# Patient Record
Sex: FEMALE | Race: WHITE | NOT HISPANIC OR LATINO | Employment: OTHER | URBAN - METROPOLITAN AREA
[De-identification: names, ages, dates, MRNs, and addresses within clinical notes are randomized per-mention and may not be internally consistent; named-entity substitution may affect disease eponyms.]

---

## 2017-01-01 ENCOUNTER — TELEPHONE (OUTPATIENT)
Dept: MEDICAL GROUP | Age: 82
End: 2017-01-01

## 2017-01-01 ENCOUNTER — OFFICE VISIT (OUTPATIENT)
Dept: MEDICAL GROUP | Age: 82
End: 2017-01-01
Payer: MEDICARE

## 2017-01-01 ENCOUNTER — HOSPITAL ENCOUNTER (OUTPATIENT)
Facility: MEDICAL CENTER | Age: 82
End: 2017-11-17
Attending: INTERNAL MEDICINE
Payer: MEDICARE

## 2017-01-01 ENCOUNTER — HOSPITAL ENCOUNTER (OUTPATIENT)
Dept: LAB | Facility: MEDICAL CENTER | Age: 82
End: 2017-11-07
Attending: INTERNAL MEDICINE
Payer: MEDICARE

## 2017-01-01 ENCOUNTER — HOSPITAL ENCOUNTER (OUTPATIENT)
Facility: MEDICAL CENTER | Age: 82
End: 2017-11-10
Attending: INTERNAL MEDICINE
Payer: MEDICARE

## 2017-01-01 VITALS
WEIGHT: 113 LBS | HEIGHT: 60 IN | OXYGEN SATURATION: 94 % | TEMPERATURE: 98.8 F | BODY MASS INDEX: 22.19 KG/M2 | HEART RATE: 91 BPM | SYSTOLIC BLOOD PRESSURE: 144 MMHG | DIASTOLIC BLOOD PRESSURE: 98 MMHG

## 2017-01-01 DIAGNOSIS — I50.22 CHRONIC SYSTOLIC HEART FAILURE (HCC): ICD-10-CM

## 2017-01-01 DIAGNOSIS — E78.2 MIXED HYPERLIPIDEMIA: ICD-10-CM

## 2017-01-01 DIAGNOSIS — K22.2 ESOPHAGEAL STRICTURE: ICD-10-CM

## 2017-01-01 DIAGNOSIS — D50.9 IRON DEFICIENCY ANEMIA, UNSPECIFIED IRON DEFICIENCY ANEMIA TYPE: ICD-10-CM

## 2017-01-01 DIAGNOSIS — I24.0 ISCHEMIC HEART DISEASE DUE TO CORONARY ARTERY OBSTRUCTION (HCC): ICD-10-CM

## 2017-01-01 DIAGNOSIS — L03.113 CELLULITIS OF FOREARM, RIGHT: ICD-10-CM

## 2017-01-01 DIAGNOSIS — I10 ESSENTIAL HYPERTENSION: ICD-10-CM

## 2017-01-01 DIAGNOSIS — N39.0 CHRONIC UTI (URINARY TRACT INFECTION): ICD-10-CM

## 2017-01-01 DIAGNOSIS — I69.328 CVA, OLD, SPEECH/LANGUAGE DEFICIT: ICD-10-CM

## 2017-01-01 DIAGNOSIS — I25.9 ISCHEMIC HEART DISEASE DUE TO CORONARY ARTERY OBSTRUCTION (HCC): ICD-10-CM

## 2017-01-01 DIAGNOSIS — D50.0 IRON DEFICIENCY ANEMIA DUE TO CHRONIC BLOOD LOSS: ICD-10-CM

## 2017-01-01 DIAGNOSIS — G25.81 RLS (RESTLESS LEGS SYNDROME): ICD-10-CM

## 2017-01-01 DIAGNOSIS — I27.20 MODERATE TO SEVERE PULMONARY HYPERTENSION (HCC): ICD-10-CM

## 2017-01-01 DIAGNOSIS — E03.9 ACQUIRED HYPOTHYROIDISM: ICD-10-CM

## 2017-01-01 DIAGNOSIS — R35.0 FREQUENCY OF URINATION: ICD-10-CM

## 2017-01-01 DIAGNOSIS — N18.30 CKD (CHRONIC KIDNEY DISEASE) STAGE 3, GFR 30-59 ML/MIN (HCC): ICD-10-CM

## 2017-01-01 DIAGNOSIS — G89.4 CHRONIC PAIN SYNDROME: ICD-10-CM

## 2017-01-01 DIAGNOSIS — Z79.01 CHRONIC ANTICOAGULATION: ICD-10-CM

## 2017-01-01 DIAGNOSIS — R93.89 ABNORMAL CT SCAN, CHEST: ICD-10-CM

## 2017-01-01 DIAGNOSIS — Z12.11 SCREEN FOR COLON CANCER: ICD-10-CM

## 2017-01-01 DIAGNOSIS — N32.81 OAB (OVERACTIVE BLADDER): ICD-10-CM

## 2017-01-01 DIAGNOSIS — I25.2 OLD MI (MYOCARDIAL INFARCTION): ICD-10-CM

## 2017-01-01 DIAGNOSIS — I07.1 SEVERE TRICUSPID REGURGITATION BY PRIOR ECHOCARDIOGRAM: ICD-10-CM

## 2017-01-01 DIAGNOSIS — I48.20 CHRONIC ATRIAL FIBRILLATION (HCC): ICD-10-CM

## 2017-01-01 LAB
ALBUMIN SERPL BCP-MCNC: 3.9 G/DL (ref 3.2–4.9)
ALBUMIN/GLOB SERPL: 1.4 G/DL
ALP SERPL-CCNC: 54 U/L (ref 30–99)
ALT SERPL-CCNC: 16 U/L (ref 2–50)
ANION GAP SERPL CALC-SCNC: 8 MMOL/L (ref 0–11.9)
APPEARANCE UR: NORMAL
AST SERPL-CCNC: 27 U/L (ref 12–45)
BACTERIA UR CULT: ABNORMAL
BASOPHILS # BLD AUTO: 1.8 % (ref 0–1.8)
BASOPHILS # BLD: 0.11 K/UL (ref 0–0.12)
BILIRUB SERPL-MCNC: 0.9 MG/DL (ref 0.1–1.5)
BILIRUB UR STRIP-MCNC: NEGATIVE MG/DL
BUN SERPL-MCNC: 28 MG/DL (ref 8–22)
CALCIUM SERPL-MCNC: 9 MG/DL (ref 8.5–10.5)
CHLORIDE SERPL-SCNC: 106 MMOL/L (ref 96–112)
CHOLEST SERPL-MCNC: 163 MG/DL (ref 100–199)
CO2 SERPL-SCNC: 24 MMOL/L (ref 20–33)
COLOR UR AUTO: NORMAL
CREAT SERPL-MCNC: 0.77 MG/DL (ref 0.5–1.4)
EOSINOPHIL # BLD AUTO: 0.09 K/UL (ref 0–0.51)
EOSINOPHIL NFR BLD: 1.4 % (ref 0–6.9)
ERYTHROCYTE [DISTWIDTH] IN BLOOD BY AUTOMATED COUNT: 45.1 FL (ref 35.9–50)
FERRITIN SERPL-MCNC: 16.4 NG/ML (ref 10–291)
GFR SERPL CREATININE-BSD FRML MDRD: >60 ML/MIN/1.73 M 2
GLOBULIN SER CALC-MCNC: 2.7 G/DL (ref 1.9–3.5)
GLUCOSE SERPL-MCNC: 89 MG/DL (ref 65–99)
GLUCOSE UR STRIP.AUTO-MCNC: NEGATIVE MG/DL
HCT VFR BLD AUTO: 35.5 % (ref 37–47)
HDLC SERPL-MCNC: 60 MG/DL
HEMOCCULT STL QL IA: NEGATIVE
HGB BLD-MCNC: 10.8 G/DL (ref 12–16)
IMM GRANULOCYTES # BLD AUTO: 0.03 K/UL (ref 0–0.11)
IMM GRANULOCYTES NFR BLD AUTO: 0.5 % (ref 0–0.9)
IRON SATN MFR SERPL: 8 % (ref 15–55)
IRON SERPL-MCNC: 39 UG/DL (ref 40–170)
KETONES UR STRIP.AUTO-MCNC: NEGATIVE MG/DL
LDLC SERPL CALC-MCNC: 91 MG/DL
LEUKOCYTE ESTERASE UR QL STRIP.AUTO: NORMAL
LYMPHOCYTES # BLD AUTO: 1.55 K/UL (ref 1–4.8)
LYMPHOCYTES NFR BLD: 24.8 % (ref 22–41)
MCH RBC QN AUTO: 25.4 PG (ref 27–33)
MCHC RBC AUTO-ENTMCNC: 30.4 G/DL (ref 33.6–35)
MCV RBC AUTO: 83.5 FL (ref 81.4–97.8)
MONOCYTES # BLD AUTO: 0.53 K/UL (ref 0–0.85)
MONOCYTES NFR BLD AUTO: 8.5 % (ref 0–13.4)
NEUTROPHILS # BLD AUTO: 3.93 K/UL (ref 2–7.15)
NEUTROPHILS NFR BLD: 63 % (ref 44–72)
NITRITE UR QL STRIP.AUTO: POSITIVE
NRBC # BLD AUTO: 0 K/UL
NRBC BLD AUTO-RTO: 0 /100 WBC
PH UR STRIP.AUTO: 7 [PH] (ref 5–8)
PLATELET # BLD AUTO: 249 K/UL (ref 164–446)
PMV BLD AUTO: 11.7 FL (ref 9–12.9)
POTASSIUM SERPL-SCNC: 4.7 MMOL/L (ref 3.6–5.5)
PROT SERPL-MCNC: 6.6 G/DL (ref 6–8.2)
PROT UR QL STRIP: 30 MG/DL
RBC # BLD AUTO: 4.25 M/UL (ref 4.2–5.4)
RBC UR QL AUTO: NORMAL
SIGNIFICANT IND 70042: ABNORMAL
SODIUM SERPL-SCNC: 138 MMOL/L (ref 135–145)
SOURCE SOURCE: ABNORMAL
SP GR UR STRIP.AUTO: 1.01
TIBC SERPL-MCNC: 504 UG/DL (ref 250–450)
TRIGL SERPL-MCNC: 62 MG/DL (ref 0–149)
TSH SERPL DL<=0.005 MIU/L-ACNC: 6.12 UIU/ML (ref 0.3–3.7)
UROBILINOGEN UR STRIP-MCNC: 0.2 MG/DL
WBC # BLD AUTO: 6.2 K/UL (ref 4.8–10.8)

## 2017-01-01 PROCEDURE — 36415 COLL VENOUS BLD VENIPUNCTURE: CPT

## 2017-01-01 PROCEDURE — 85025 COMPLETE CBC W/AUTO DIFF WBC: CPT

## 2017-01-01 PROCEDURE — 81002 URINALYSIS NONAUTO W/O SCOPE: CPT | Performed by: INTERNAL MEDICINE

## 2017-01-01 PROCEDURE — 83550 IRON BINDING TEST: CPT

## 2017-01-01 PROCEDURE — 99215 OFFICE O/P EST HI 40 MIN: CPT | Performed by: INTERNAL MEDICINE

## 2017-01-01 PROCEDURE — 82728 ASSAY OF FERRITIN: CPT

## 2017-01-01 PROCEDURE — 84443 ASSAY THYROID STIM HORMONE: CPT

## 2017-01-01 PROCEDURE — 82274 ASSAY TEST FOR BLOOD FECAL: CPT

## 2017-01-01 PROCEDURE — 99000 SPECIMEN HANDLING OFFICE-LAB: CPT | Performed by: INTERNAL MEDICINE

## 2017-01-01 PROCEDURE — 87086 URINE CULTURE/COLONY COUNT: CPT

## 2017-01-01 PROCEDURE — 87186 SC STD MICRODIL/AGAR DIL: CPT

## 2017-01-01 PROCEDURE — 80053 COMPREHEN METABOLIC PANEL: CPT

## 2017-01-01 PROCEDURE — 87077 CULTURE AEROBIC IDENTIFY: CPT

## 2017-01-01 PROCEDURE — 83540 ASSAY OF IRON: CPT

## 2017-01-01 PROCEDURE — 80061 LIPID PANEL: CPT

## 2017-01-01 RX ORDER — DULOXETIN HYDROCHLORIDE 20 MG/1
20 CAPSULE, DELAYED RELEASE ORAL DAILY
Qty: 30 CAP | Refills: 11
Start: 2017-01-01 | End: 2018-01-01

## 2017-01-01 RX ORDER — NITROFURANTOIN 25; 75 MG/1; MG/1
100 CAPSULE ORAL DAILY
Qty: 90 CAP | Refills: 4 | Status: ON HOLD | OUTPATIENT
Start: 2017-01-01 | End: 2018-01-01

## 2017-01-01 RX ORDER — FERROUS SULFATE 325(65) MG
325 TABLET ORAL DAILY
Qty: 180 TAB | Refills: 1 | Status: SHIPPED | OUTPATIENT
Start: 2017-01-01 | End: 2017-01-01 | Stop reason: SDUPTHER

## 2017-01-01 RX ORDER — FERROUS SULFATE 325(65) MG
325 TABLET ORAL DAILY
Qty: 90 TAB | Refills: 3 | Status: ON HOLD
Start: 2017-01-01 | End: 2018-01-01

## 2017-01-01 RX ORDER — LEVOTHYROXINE SODIUM 0.03 MG/1
25 TABLET ORAL
Qty: 90 TAB | Refills: 4 | Status: SHIPPED | OUTPATIENT
Start: 2017-01-01 | End: 2018-01-01

## 2017-01-01 RX ORDER — NITROFURANTOIN 25; 75 MG/1; MG/1
100 CAPSULE ORAL 2 TIMES DAILY
COMMUNITY
End: 2017-01-01 | Stop reason: SDUPTHER

## 2017-01-01 ASSESSMENT — ENCOUNTER SYMPTOMS
EYES NEGATIVE: 1
CONSTITUTIONAL NEGATIVE: 1
CARDIOVASCULAR NEGATIVE: 1
PSYCHIATRIC NEGATIVE: 1
NEUROLOGICAL NEGATIVE: 1
MUSCULOSKELETAL NEGATIVE: 1
GASTROINTESTINAL NEGATIVE: 1
RESPIRATORY NEGATIVE: 1

## 2017-01-01 ASSESSMENT — PATIENT HEALTH QUESTIONNAIRE - PHQ9: CLINICAL INTERPRETATION OF PHQ2 SCORE: 0

## 2017-01-05 ENCOUNTER — HOME CARE VISIT (OUTPATIENT)
Dept: HOME HEALTH SERVICES | Facility: HOME HEALTHCARE | Age: 82
End: 2017-01-05
Payer: MEDICARE

## 2017-01-05 PROCEDURE — G0153 HHCP-SVS OF S/L PATH,EA 15MN: HCPCS

## 2017-01-06 ENCOUNTER — HOME CARE VISIT (OUTPATIENT)
Dept: HOME HEALTH SERVICES | Facility: HOME HEALTHCARE | Age: 82
End: 2017-01-06
Payer: MEDICARE

## 2017-01-06 VITALS
TEMPERATURE: 99.2 F | BODY MASS INDEX: 21.74 KG/M2 | RESPIRATION RATE: 16 BRPM | SYSTOLIC BLOOD PRESSURE: 158 MMHG | WEIGHT: 111.3 LBS | DIASTOLIC BLOOD PRESSURE: 70 MMHG | HEART RATE: 88 BPM

## 2017-01-06 PROCEDURE — G0162 HHC RN E&M PLAN SVS, 15 MIN: HCPCS

## 2017-01-06 ASSESSMENT — ACTIVITIES OF DAILY LIVING (ADL)
HOME_HEALTH_OASIS: 01
OASIS_M1830: 01

## 2017-01-06 ASSESSMENT — ENCOUNTER SYMPTOMS: DIFFICULTY THINKING: 1

## 2017-01-09 SDOH — ECONOMIC STABILITY: HOUSING INSECURITY: UNSAFE APPLIANCES: 0

## 2017-01-09 SDOH — ECONOMIC STABILITY: HOUSING INSECURITY: UNSAFE COOKING RANGE AREA: 0

## 2017-02-08 ENCOUNTER — HOSPITAL ENCOUNTER (OUTPATIENT)
Dept: LAB | Facility: MEDICAL CENTER | Age: 82
End: 2017-02-08
Attending: INTERNAL MEDICINE
Payer: MEDICARE

## 2017-02-08 DIAGNOSIS — E78.5 DYSLIPIDEMIA: ICD-10-CM

## 2017-02-08 LAB
ALBUMIN SERPL BCP-MCNC: 4.6 G/DL (ref 3.2–4.9)
ALBUMIN/GLOB SERPL: 1.9 G/DL
ALP SERPL-CCNC: 48 U/L (ref 30–99)
ALT SERPL-CCNC: 18 U/L (ref 2–50)
ANION GAP SERPL CALC-SCNC: 10 MMOL/L (ref 0–11.9)
AST SERPL-CCNC: 26 U/L (ref 12–45)
BILIRUB SERPL-MCNC: 1 MG/DL (ref 0.1–1.5)
BUN SERPL-MCNC: 28 MG/DL (ref 8–22)
CALCIUM SERPL-MCNC: 10 MG/DL (ref 8.5–10.5)
CHLORIDE SERPL-SCNC: 104 MMOL/L (ref 96–112)
CHOLEST SERPL-MCNC: 215 MG/DL (ref 100–199)
CO2 SERPL-SCNC: 24 MMOL/L (ref 20–33)
CREAT SERPL-MCNC: 0.96 MG/DL (ref 0.5–1.4)
GLOBULIN SER CALC-MCNC: 2.4 G/DL (ref 1.9–3.5)
GLUCOSE SERPL-MCNC: 86 MG/DL (ref 65–99)
HDLC SERPL-MCNC: 72 MG/DL
LDLC SERPL CALC-MCNC: 127 MG/DL
POTASSIUM SERPL-SCNC: 4.2 MMOL/L (ref 3.6–5.5)
PROT SERPL-MCNC: 7 G/DL (ref 6–8.2)
SODIUM SERPL-SCNC: 138 MMOL/L (ref 135–145)
TRIGL SERPL-MCNC: 82 MG/DL (ref 0–149)

## 2017-02-08 PROCEDURE — 80061 LIPID PANEL: CPT

## 2017-02-08 PROCEDURE — 36415 COLL VENOUS BLD VENIPUNCTURE: CPT

## 2017-02-08 PROCEDURE — 80053 COMPREHEN METABOLIC PANEL: CPT

## 2017-02-09 ENCOUNTER — TELEPHONE (OUTPATIENT)
Dept: CARDIOLOGY | Facility: MEDICAL CENTER | Age: 82
End: 2017-02-09

## 2017-02-09 DIAGNOSIS — I25.83 CORONARY ARTERY DISEASE DUE TO LIPID RICH PLAQUE: ICD-10-CM

## 2017-02-09 DIAGNOSIS — I25.10 CORONARY ARTERY DISEASE INVOLVING NATIVE CORONARY ARTERY OF NATIVE HEART WITHOUT ANGINA PECTORIS: ICD-10-CM

## 2017-02-09 DIAGNOSIS — I25.10 CORONARY ARTERY DISEASE DUE TO LIPID RICH PLAQUE: ICD-10-CM

## 2017-02-10 NOTE — TELEPHONE ENCOUNTER
----- Message from Kirsten Garza M.D. sent at 2/9/2017  2:58 PM PST -----    LDL not at goal. Unclear whether the patient is taking Zocor or not. If patient is taking Zocor then we should switch her to Crestor 20 mg Qhs.  Check labs in 2 months.  If patient is not taking Zocor and if she is willing then ask her to start taking Zocor 40 mg Qhs.  Labs in 2 months.    ----- Message -----     From: Joyce Johnson R.N.     Sent: 2/9/2017   8:51 AM       To: Kirsten Garza M.D.    F/u 3/15/17

## 2017-02-10 NOTE — TELEPHONE ENCOUNTER
After reviewing notes, pt was switched to Crestor 20mg from Zocor 40mg back in December and these are the repeated labs on Crestor 20mg.    S/w pt's caregiver, confirmed the above and states pt has been taking Crestor 20mg qhs.  Advised will check back with AM and call back with recommendations.  Agreed.    Pt is taking Crestor 20mg and has for the past 2 months, to AM for recommendation. Please respond to Nicolasa Mueller, she will be the nurse on Friday, 2/10/17.

## 2017-02-10 NOTE — TELEPHONE ENCOUNTER
Spoke with PT's caregiver and now she reports that this AM she reviewed medications with PT and says that she has not been taking any Crestor at least for over a month. Caregiver will make sure she will restart taking her Crestor 20 mg.  To  for update.  MMR

## 2017-02-15 ENCOUNTER — HOSPITAL ENCOUNTER (OUTPATIENT)
Facility: MEDICAL CENTER | Age: 82
End: 2017-02-15
Attending: INTERNAL MEDICINE
Payer: MEDICARE

## 2017-02-15 ENCOUNTER — OFFICE VISIT (OUTPATIENT)
Dept: MEDICAL GROUP | Age: 82
End: 2017-02-15
Payer: MEDICARE

## 2017-02-15 VITALS
WEIGHT: 109.6 LBS | DIASTOLIC BLOOD PRESSURE: 72 MMHG | HEIGHT: 60 IN | RESPIRATION RATE: 16 BRPM | HEART RATE: 86 BPM | OXYGEN SATURATION: 94 % | SYSTOLIC BLOOD PRESSURE: 122 MMHG | BODY MASS INDEX: 21.52 KG/M2 | TEMPERATURE: 97.5 F

## 2017-02-15 DIAGNOSIS — I48.20 CHRONIC ATRIAL FIBRILLATION (HCC): ICD-10-CM

## 2017-02-15 DIAGNOSIS — N30.00 ACUTE CYSTITIS WITHOUT HEMATURIA: ICD-10-CM

## 2017-02-15 DIAGNOSIS — D50.8 OTHER IRON DEFICIENCY ANEMIA: ICD-10-CM

## 2017-02-15 DIAGNOSIS — E78.2 MIXED HYPERLIPIDEMIA: ICD-10-CM

## 2017-02-15 DIAGNOSIS — I10 ESSENTIAL HYPERTENSION: ICD-10-CM

## 2017-02-15 LAB
APPEARANCE UR: CLEAR
BILIRUB UR STRIP-MCNC: NORMAL MG/DL
COLOR UR AUTO: YELLOW
GLUCOSE UR STRIP.AUTO-MCNC: NORMAL MG/DL
KETONES UR STRIP.AUTO-MCNC: NORMAL MG/DL
LEUKOCYTE ESTERASE UR QL STRIP.AUTO: NORMAL
NITRITE UR QL STRIP.AUTO: NORMAL
PH UR STRIP.AUTO: 6 [PH] (ref 5–8)
PROT UR QL STRIP: NORMAL MG/DL
RBC UR QL AUTO: NORMAL
SP GR UR STRIP.AUTO: 1
UROBILINOGEN UR STRIP-MCNC: NORMAL MG/DL

## 2017-02-15 PROCEDURE — 87086 URINE CULTURE/COLONY COUNT: CPT

## 2017-02-15 PROCEDURE — 1036F TOBACCO NON-USER: CPT | Performed by: INTERNAL MEDICINE

## 2017-02-15 PROCEDURE — 87186 SC STD MICRODIL/AGAR DIL: CPT

## 2017-02-15 PROCEDURE — G8484 FLU IMMUNIZE NO ADMIN: HCPCS | Performed by: INTERNAL MEDICINE

## 2017-02-15 PROCEDURE — 87077 CULTURE AEROBIC IDENTIFY: CPT

## 2017-02-15 PROCEDURE — 4040F PNEUMOC VAC/ADMIN/RCVD: CPT | Performed by: INTERNAL MEDICINE

## 2017-02-15 PROCEDURE — G8419 CALC BMI OUT NRM PARAM NOF/U: HCPCS | Performed by: INTERNAL MEDICINE

## 2017-02-15 PROCEDURE — G8599 NO ASA/ANTIPLAT THER USE RNG: HCPCS | Performed by: INTERNAL MEDICINE

## 2017-02-15 PROCEDURE — 1101F PT FALLS ASSESS-DOCD LE1/YR: CPT | Performed by: INTERNAL MEDICINE

## 2017-02-15 PROCEDURE — 81002 URINALYSIS NONAUTO W/O SCOPE: CPT | Performed by: INTERNAL MEDICINE

## 2017-02-15 PROCEDURE — 99214 OFFICE O/P EST MOD 30 MIN: CPT | Performed by: INTERNAL MEDICINE

## 2017-02-15 PROCEDURE — G8432 DEP SCR NOT DOC, RNG: HCPCS | Performed by: INTERNAL MEDICINE

## 2017-02-15 RX ORDER — NITROFURANTOIN 25; 75 MG/1; MG/1
100 CAPSULE ORAL 2 TIMES DAILY
Qty: 20 CAP | Refills: 1 | Status: SHIPPED | OUTPATIENT
Start: 2017-02-15 | End: 2017-03-30 | Stop reason: SDUPTHER

## 2017-02-15 ASSESSMENT — ENCOUNTER SYMPTOMS
CONSTITUTIONAL NEGATIVE: 1
CARDIOVASCULAR NEGATIVE: 1
MUSCULOSKELETAL NEGATIVE: 1
NEUROLOGICAL NEGATIVE: 1
GASTROINTESTINAL NEGATIVE: 1
EYES NEGATIVE: 1
RESPIRATORY NEGATIVE: 1
PSYCHIATRIC NEGATIVE: 1

## 2017-02-15 NOTE — MR AVS SNAPSHOT
Barbara Zaragoza Trevor   2/15/2017 10:20 AM   Office Visit   MRN: [id number]    Department:  [address] [place] [address]   Dept Phone:  [phone]    Description:  Female : 8/10/1928   Provider:  Mahendra Morrow M.D.           Reason for Visit     UTI f/v on UTI and urinary frequency      Allergies as of 2/15/2017     Allergen Noted Reactions    Pcn [Penicillins] 2010   Hives      You were diagnosed with     Acute cystitis without hematuria   [511150]       Mixed hyperlipidemia   [272.2.ICD-9-CM]       Essential hypertension   [4240837]       Other iron deficiency anemia   [5703419]       Chronic atrial fibrillation (CMS-HCC)   [278839]         Vital Signs     Blood Pressure Pulse Temperature Respirations Height Weight    122/72 mmHg 86 36.4 °C (97.5 °F) 16 1.524 m (5') 49.714 kg (109 lb 9.6 oz)    Body Mass Index Oxygen Saturation Smoking Status             21.40 kg/m2 94% Former Smoker         Basic Information     Date Of Birth Sex Race Ethnicity Preferred Language    8/10/1928 Female White Non- English      Your appointments     Aug 16, 2017  9:40 AM   Established Patient with Mahendra Morrow M.D.   [address] [address] [phone]           You will be receiving a confirmation call a few days before your appointment from our automated call confirmation system.              Problem List              ICD-10-CM Priority Class Noted - Resolved    Esophageal stricture- s/p dilation dr foley K22.2   2013 - Present    MI (myocardial infarction) (CMS-HCC) I21.3 High  2013 - Present    Glaucoma H40.9 Low  10/7/2013 - Present    Chronic atrial fibrillation- rx Eliquis; dr scruggs I48.2 Medium  2014 - Present    Chronic anticoagulation Z79.01 Low  2015 - Present    CHF (congestive heart failure)- EF=51% 2015; dr scruggs I50.9 High  2015 - Present    Iron deficiency anemia D50.9 Medium  2015 - Present    Moderate to severe pulmonary hypertension (CMS-HCC) I27.2 High  5/14/2015 - Present    Coronary artery disease involving native coronary artery of native heart without angina pectoris- non STEMI 2013; no stents or surgery; dr scruggs I25.10   10/6/2015 - Present    Acquired trigger finger M65.30   10/20/2015 - Present    Acute carpal tunnel syndrome of right wrist- repaired 10/2015 dr schmidt G56.01   10/20/2015 - Present    CVA, old, speech/language deficit- 2013 I69.328 Low  10/21/2015 - Present    Right knee pain- 12/4/2015; s/pd right TKR 2010 M25.561   12/7/2015 - Present    Muscular chest pain- right since 12/2014 R07.89   12/7/2015 - Present    Chronic pain syndrome-Dr. Capellan G89.4   4/20/2016 - Present    Urinary frequency R35.0   10/11/2016 - Present    Pruritic dermatitis L29.9   10/31/2016 - Present    Acute on chronic systolic heart failure (CMS-HCC) I50.23 High  11/5/2016 - Present    Atrial fibrillation (CMS-HCC) I48.91 High  11/5/2016 - Present    CKD (chronic kidney disease), stage II N18.2 Low  11/5/2016 - Present    HLD (hyperlipidemia) E78.5 Low  11/5/2016 - Present    HTN (hypertension) I10 Low  11/5/2016 - Present    H/O valvular heart disease Z86.79 Low  11/5/2016 - Present    CAD (coronary artery disease) I25.10 Low  11/5/2016 - Present    RLS (restless legs syndrome) G25.81 Low  11/5/2016 - Present    SOB (shortness of breath) R06.02   11/9/2016 - Present      Health Maintenance        Date Due Completion Dates    IMM DTaP/Tdap/Td Vaccine (1 - Tdap) 8/10/1947 ---    IMM ZOSTER VACCINE 8/10/1988 ---    MAMMOGRAM 3/19/2015 3/19/2014, 2/7/2013, 6/15/2012, 6/12/2012, 1/27/2011, 12/15/2009, 12/15/2009, 11/13/2008, 11/13/2008, 10/9/2007, 10/9/2007, 9/7/2006, 9/7/2006, 8/3/2005    IMM INFLUENZA (1) 9/1/2016 10/28/2013, 10/1/2012    BONE DENSITY 7/8/2019 7/8/2014, 4/12/2011            Current Immunizations     13-VALENT PCV PREVNAR 12/7/2015, 10/21/2015    Influenza TIV (IM) 10/28/2013, 10/1/2012     Pneumococcal polysaccharide vaccine (PPSV-23) 8/1/2008      Below and/or attached are the medications your provider expects you to take. Review all of your home medications and newly ordered medications with your provider and/or pharmacist. Follow medication instructions as directed by your provider and/or pharmacist. Please keep your medication list with you and share with your provider. Update the information when medications are discontinued, doses are changed, or new medications (including over-the-counter products) are added; and carry medication information at all times in the event of emergency situations     Allergies:  PCN - Hives               Medications  Valid as of: February 15, 2017 - 11:15 AM    Generic Name Brand Name Tablet Size Instructions for use    Apixaban (Tab) ELIQUIS 2.5mg Take 1 Tab by mouth 2 Times a Day.        Betaxolol HCl (Suspension) BETOPTIC-S 0.25 % Place 1 Drop in both eyes every day.        Carvedilol (Tab) COREG 6.25 MG Take 1 Tab by mouth 2 times a day, with meals.        Cholecalciferol (Cap) Vitamin D 2000 UNITS Take 1 Cap by mouth every day.        Ciprofloxacin HCl (Tab) CIPRO 500 MG Take 500 mg by mouth 2 times a day.        Docusate Sodium (Cap) COLACE 100 MG Take 100 mg by mouth 2 times a day.        Furosemide (Tab) LASIX 20 MG Take 1 Tab by mouth 2 times a day.        Latanoprost (Solution) XALATAN 0.005 % Place 1 Drop in both eyes every evening.        Lisinopril (Tab) PRINIVIL 5 MG Take 5 mg by mouth every day.        Nitrofurantoin Monohyd Macro (Cap) MACROBID 100 MG Take 1 Cap by mouth 2 times a day.        Polyethylene Glycol 3350 (Pack) MIRALAX  Take 1 Packet by mouth every day.        ROPINIRole HCl (Tab) REQUIP 1 MG Take 1 Tab by mouth 3 times a day.        Rosuvastatin Calcium (Tab) CRESTOR 20 MG Take 1 Tab by mouth every evening.        Spironolactone (Tab) ALDACTONE 50 MG Take 1 Tab by mouth every day.        TraMADol HCl (Tab) ULTRAM 50 MG Take  mg by  mouth every four hours as needed. Indications: Moderate to Moderately Severe Pain        Triamcinolone Acetonide (Cream) KENALOG 0.1 % Apply 1 Application to affected area(s) 2 times a day.        .                 Medicines prescribed today were sent to:     [id number] - [place], [place] - [address]    [address] [place] [address]    Phone: [phone] Fax: [phone]    Open 24 Hours?: No      Medication refill instructions:       If your prescription bottle indicates you have medication refills left, it is not necessary to call your provider’s office. Please contact your pharmacy and they will refill your medication.    If your prescription bottle indicates you do not have any refills left, you may request refills at any time through one of the following ways: The online Liquipel system (except Urgent Care), by calling your provider’s office, or by asking your pharmacy to contact your provider’s office with a refill request. Medication refills are processed only during regular business hours and may not be available until the next business day. Your provider may request additional information or to have a follow-up visit with you prior to refilling your medication.   *Please Note: Medication refills are assigned a new Rx number when refilled electronically. Your pharmacy may indicate that no refills were authorized even though a new prescription for the same medication is available at the pharmacy. Please request the medicine by name with the pharmacy before contacting your provider for a refill.        Your To Do List     Future Labs/Procedures Complete By Expires    URINE CULTURE(NEW)  As directed 2/15/2018         Liquipel Access Code: [email or web address]  Expires: 3/10/2017  9:06 AM    Liquipel  A secure, online tool to manage your health information     [employer]’s Liquipel® is a secure, online tool that connects you to your personalized health information from the privacy of your home --  day or night - making it very easy for you to manage your healthcare. Once the activation process is completed, you can even access your medical information using the RF Arrays tiffani, which is available for free in the [employer] Tiffani store or Google Play store.     RF Arrays provides the following levels of access (as shown below):   My Chart Features   [facility] Doctor [facility]  Specialists [facility]  Urgent  Care Non-[facility]  Primary Care  Doctor   Email your healthcare team securely and privately 24/7 X X X    Manage appointments: schedule your next appointment; view details of past/upcoming appointments X      Request prescription refills. X      View recent personal medical records, including lab and immunizations X X X X   View health record, including health history, allergies, medications X X X X   Read reports about your outpatient visits, procedures, consult and ER notes X X X X   See your discharge summary, which is a recap of your hospital and/or ER visit that includes your diagnosis, lab results, and care plan. X X       How to register for RF Arrays:  1. Go to  [email or web address].  2. Click on the Sign Up Now box, which takes you to the New Member Sign Up page. You will need to provide the following information:  a. Enter your RF Arrays Access Code exactly as it appears at the top of this page. (You will not need to use this code after you’ve completed the sign-up process. If you do not sign up before the expiration date, you must request a new code.)   b. Enter your date of birth.   c. Enter your home email address.   d. Click Submit, and follow the next screen’s instructions.  3. Create a RF Arrays ID. This will be your RF Arrays login ID and cannot be changed, so think of one that is secure and easy to remember.  4. Create a RF Arrays password. You can change your password at any time.  5. Enter your Password Reset Question and Answer. This can be used at a later time if you forget your password.   6. Enter  your e-mail address. This allows you to receive e-mail notifications when new information is available in [employer].  7. Click Sign Up. You can now view your health information.    For assistance activating your [employer] account, call [phone]

## 2017-02-16 NOTE — PROGRESS NOTES
Subjective:      Barbara Murray is a 88 y.o. female who presents with UTI  The patient is here for followup of chronic medical problems listed below. The patient is compliant with medications and having no side effects from them. Denies chest pain, abdominal pain, dyspnea, myalgias, or cough.   Patient Active Problem List    Diagnosis Date Noted   • Acute on chronic systolic heart failure (CMS-HCC) 11/05/2016     Priority: High   • Atrial fibrillation (CMS-HCC) 11/05/2016     Priority: High   • Moderate to severe pulmonary hypertension (CMS-HCC) 05/14/2015     Priority: High   • CHF (congestive heart failure)- EF=51% 5/2015; dr scruggs 05/11/2015     Priority: High   • MI (myocardial infarction) (CMS-HCC) 08/14/2013     Priority: High   • Iron deficiency anemia 05/12/2015     Priority: Medium   • Chronic atrial fibrillation- rx Eliquis; dr scruggs 04/29/2014     Priority: Medium   • CKD (chronic kidney disease), stage II 11/05/2016     Priority: Low   • HLD (hyperlipidemia) 11/05/2016     Priority: Low   • HTN (hypertension) 11/05/2016     Priority: Low   • H/O valvular heart disease 11/05/2016     Priority: Low   • CAD (coronary artery disease) 11/05/2016     Priority: Low   • RLS (restless legs syndrome) 11/05/2016     Priority: Low   • CVA, old, speech/language deficit- 2013 10/21/2015     Priority: Low   • Chronic anticoagulation 04/30/2015     Priority: Low   • Glaucoma 10/07/2013     Priority: Low   • SOB (shortness of breath) 11/09/2016   • Pruritic dermatitis 10/31/2016   • Urinary frequency 10/11/2016   • Chronic pain syndrome-Dr. Capellan 04/20/2016   • Right knee pain- 12/4/2015; s/pd right TKR 2010 12/07/2015   • Muscular chest pain- right since 12/2014 12/07/2015   • Acquired trigger finger 10/20/2015   • Acute carpal tunnel syndrome of right wrist- repaired 10/2015 dr schmidt 10/20/2015   • Coronary artery disease involving native coronary artery of native heart without angina pectoris- non STEMI 2013;  no stents or surgery; dr scruggs 10/06/2015   • Esophageal stricture- s/p dilation dr foley 07/31/2013     Allergies   Allergen Reactions   • Pcn [Penicillins] Hives     Outpatient Prescriptions Prior to Visit   Medication Sig Dispense Refill   • rosuvastatin (CRESTOR) 20 MG Tab Take 1 Tab by mouth every evening. 30 Tab 3   • ciprofloxacin (CIPRO) 500 MG Tab Take 500 mg by mouth 2 times a day.     • furosemide (LASIX) 20 MG Tab Take 1 Tab by mouth 2 times a day. 60 Tab 11   • ropinirole (REQUIP) 1 MG Tab Take 1 Tab by mouth 3 times a day. 90 Tab 11   • carvedilol (COREG) 6.25 MG Tab Take 1 Tab by mouth 2 times a day, with meals. 60 Tab 0   • spironolactone (ALDACTONE) 50 MG Tab Take 1 Tab by mouth every day. 30 Tab 0   • polyethylene glycol/lytes (MIRALAX) Pack Take 1 Packet by mouth every day. 30 Each 0   • tramadol (ULTRAM) 50 MG Tab Take  mg by mouth every four hours as needed. Indications: Moderate to Moderately Severe Pain     • triamcinolone acetonide (KENALOG) 0.1 % Cream Apply 1 Application to affected area(s) 2 times a day. 45 g 0   • Cholecalciferol (VITAMIN D) 2000 UNITS Cap Take 1 Cap by mouth every day. 100 Cap 4   • apixaban (ELIQUIS) 2.5mg Tab Take 1 Tab by mouth 2 Times a Day. 56 Tab 0   • docusate sodium (COLACE) 100 MG CAPS Take 100 mg by mouth 2 times a day.     • lisinopril (PRINIVIL) 5 MG TABS Take 5 mg by mouth every day.     • latanoprost (XALATAN) 0.005 % SOLN Place 1 Drop in both eyes every evening.     • betaxolol (BETOPTIC-S) 0.25 % SUSP Place 1 Drop in both eyes every day.       No facility-administered medications prior to visit.               UTI        Review of Systems   Constitutional: Negative.    HENT: Negative.    Eyes: Negative.    Respiratory: Negative.    Cardiovascular: Negative.    Gastrointestinal: Negative.    Genitourinary: Negative.    Musculoskeletal: Negative.    Skin: Negative.    Neurological: Negative.    Endo/Heme/Allergies: Negative.    Psychiatric/Behavioral:  Negative.           Objective:     /72 mmHg  Pulse 86  Temp(Src) 36.4 °C (97.5 °F)  Resp 16  Ht 1.524 m (5')  Wt 49.714 kg (109 lb 9.6 oz)  BMI 21.40 kg/m2  SpO2 94%     Physical Exam   Constitutional: She is oriented to person, place, and time. She appears well-developed and well-nourished.   HENT:   Head: Normocephalic and atraumatic.   Right Ear: External ear normal.   Left Ear: External ear normal.   Nose: Nose normal.   Mouth/Throat: Oropharynx is clear and moist.   Eyes: Conjunctivae and EOM are normal. Pupils are equal, round, and reactive to light. Right eye exhibits no discharge. Left eye exhibits no discharge. No scleral icterus.   Neck: Normal range of motion. Neck supple. No JVD present. No tracheal deviation present. No thyromegaly present.   Cardiovascular: Normal rate, regular rhythm, normal heart sounds and intact distal pulses.  Exam reveals no gallop and no friction rub.    Pulmonary/Chest: Effort normal and breath sounds normal. No stridor. No respiratory distress. She has no wheezes. She has no rales. She exhibits no tenderness.   Abdominal: Soft. Bowel sounds are normal. She exhibits no distension and no mass. There is no tenderness. There is no rebound and no guarding. No hernia.   Musculoskeletal: Normal range of motion. She exhibits no edema or tenderness.   Lymphadenopathy:     She has no cervical adenopathy.   Neurological: She is alert and oriented to person, place, and time. She has normal reflexes. She displays normal reflexes. No cranial nerve deficit. Coordination normal.   Skin: Skin is warm and dry. No rash noted. No erythema. No pallor.   Psychiatric: She has a normal mood and affect. Her behavior is normal. Judgment and thought content normal.   Nursing note and vitals reviewed.    Hospital Outpatient Visit on 02/08/2017   Component Date Value   • Sodium 02/08/2017 138    • Potassium 02/08/2017 4.2    • Chloride 02/08/2017 104    • Co2 02/08/2017 24    • Anion Gap  02/08/2017 10.0    • Glucose 02/08/2017 86    • Bun 02/08/2017 28*   • Creatinine 02/08/2017 0.96    • Calcium 02/08/2017 10.0    • AST(SGOT) 02/08/2017 26    • ALT(SGPT) 02/08/2017 18    • Alkaline Phosphatase 02/08/2017 48    • Total Bilirubin 02/08/2017 1.0    • Albumin 02/08/2017 4.6    • Total Protein 02/08/2017 7.0    • Globulin 02/08/2017 2.4    • A-G Ratio 02/08/2017 1.9    • Cholesterol,Tot 02/08/2017 215*   • Triglycerides 02/08/2017 82    • HDL 02/08/2017 72    • LDL 02/08/2017 127*   • GFR If  02/08/2017 >60    • GFR If Non  Ameri* 02/08/2017 55*      Lab Results   Component Value Date/Time    GLYCOHEMOGLOBIN 6.1* 10/10/2013 05:59 AM    GLYCOHEMOGLOBIN 4.9 04/21/2012 09:36 AM     Lab Results   Component Value Date/Time    SODIUM 138 02/08/2017 09:13 AM    POTASSIUM 4.2 02/08/2017 09:13 AM    CHLORIDE 104 02/08/2017 09:13 AM    CO2 24 02/08/2017 09:13 AM    GLUCOSE 86 02/08/2017 09:13 AM    BUN 28* 02/08/2017 09:13 AM    CREATININE 0.96 02/08/2017 09:13 AM    BUN-CREATININE RATIO 23 07/10/2015 01:18 PM    GLOM FILT RATE, EST 53* 05/03/2010 12:03 PM    ALKALINE PHOSPHATASE 48 02/08/2017 09:13 AM    AST(SGOT) 26 02/08/2017 09:13 AM    ALT(SGPT) 18 02/08/2017 09:13 AM    TOTAL BILIRUBIN 1.0 02/08/2017 09:13 AM     Lab Results   Component Value Date/Time    INR 1.23* 11/04/2016 09:50 AM    INR 1.34* 05/11/2015 07:30 PM    INR 2.44* 09/05/2014 02:42 PM     Lab Results   Component Value Date/Time    CHOLESTEROL,* 02/08/2017 09:13 AM    * 02/08/2017 09:13 AM    HDL 72 02/08/2017 09:13 AM    TRIGLYCERIDES 82 02/08/2017 09:13 AM       No results found for: TESTOSTERONE  Lab Results   Component Value Date/Time    TSH 2.410 05/03/2010 12:03 PM     Lab Results   Component Value Date/Time    FREE T-4 0.91 06/17/2016 09:08 AM    FREE T-4 0.89 12/10/2015 09:23 AM     No results found for: URICACID  No components found for: VITB12  Lab Results   Component Value Date/Time    25-HYDROXY    VITAMIN D 25 31 11/04/2016 09:50 AM    25-HYDROXY   VITAMIN D 25 19* 06/17/2016 09:08 AM                   Assessment/Plan:     1. Acute cystitis without hematuria      - nitrofurantoin monohydr macro (MACROBID) 100 MG Cap; Take 1 Cap by mouth 2 times a day.  Dispense: 20 Cap; Refill: 1  - URINE CULTURE(NEW); Future  - POCT Urinalysis    2. Mixed hyperlipidemia       Under good control. Continue same regimen  3. Essential hypertension     Under good control. Continue same regimen    4. Other iron deficiency anemia    Under good control. Continue same regimen    5. Chronic atrial fibrillation- rx Eliquis; dr scruggs    Under good control. Continue same regimen    30 minute face-to-face encounter took place today.  More than half of this time was spent in the coordination of care of the above problems, as well as counseling.

## 2017-02-17 DIAGNOSIS — N39.0 URINARY TRACT INFECTION, SITE UNSPECIFIED: ICD-10-CM

## 2017-02-17 LAB
BACTERIA UR CULT: ABNORMAL
SIGNIFICANT IND 70042: ABNORMAL
SOURCE SOURCE: ABNORMAL

## 2017-02-21 ENCOUNTER — TELEPHONE (OUTPATIENT)
Dept: MEDICAL GROUP | Age: 82
End: 2017-02-21

## 2017-02-21 NOTE — TELEPHONE ENCOUNTER
ENED[place] ONLY    Phone Number Called: [phone] (home) [phone] (work)      Message: Informed patient of message below. Patient stated understanding    Left Message for patient to call back: N\A

## 2017-02-24 DIAGNOSIS — I48.19 PERSISTENT ATRIAL FIBRILLATION (HCC): ICD-10-CM

## 2017-03-30 ENCOUNTER — OFFICE VISIT (OUTPATIENT)
Dept: MEDICAL GROUP | Age: 82
End: 2017-03-30
Payer: MEDICARE

## 2017-03-30 ENCOUNTER — HOSPITAL ENCOUNTER (OUTPATIENT)
Facility: MEDICAL CENTER | Age: 82
End: 2017-03-30
Attending: INTERNAL MEDICINE
Payer: MEDICARE

## 2017-03-30 VITALS
OXYGEN SATURATION: 98 % | BODY MASS INDEX: 21.52 KG/M2 | HEART RATE: 59 BPM | DIASTOLIC BLOOD PRESSURE: 60 MMHG | SYSTOLIC BLOOD PRESSURE: 112 MMHG | HEIGHT: 60 IN | TEMPERATURE: 98 F | WEIGHT: 109.6 LBS

## 2017-03-30 DIAGNOSIS — N39.0 CHRONIC UTI (URINARY TRACT INFECTION): ICD-10-CM

## 2017-03-30 DIAGNOSIS — R35.0 URINARY FREQUENCY: ICD-10-CM

## 2017-03-30 DIAGNOSIS — E78.2 MIXED HYPERLIPIDEMIA: ICD-10-CM

## 2017-03-30 DIAGNOSIS — N18.30 CKD (CHRONIC KIDNEY DISEASE) STAGE 3, GFR 30-59 ML/MIN (HCC): ICD-10-CM

## 2017-03-30 DIAGNOSIS — D50.9 IRON DEFICIENCY ANEMIA, UNSPECIFIED IRON DEFICIENCY ANEMIA TYPE: ICD-10-CM

## 2017-03-30 DIAGNOSIS — I10 ESSENTIAL HYPERTENSION: ICD-10-CM

## 2017-03-30 LAB
APPEARANCE UR: NORMAL
BILIRUB UR STRIP-MCNC: NORMAL MG/DL
COLOR UR AUTO: NORMAL
GLUCOSE UR STRIP.AUTO-MCNC: NORMAL MG/DL
KETONES UR STRIP.AUTO-MCNC: NORMAL MG/DL
LEUKOCYTE ESTERASE UR QL STRIP.AUTO: NORMAL
NITRITE UR QL STRIP.AUTO: NORMAL
PH UR STRIP.AUTO: 7 [PH] (ref 5–8)
PROT UR QL STRIP: NORMAL MG/DL
RBC UR QL AUTO: NORMAL
SP GR UR STRIP.AUTO: 1.01
UROBILINOGEN UR STRIP-MCNC: NORMAL MG/DL

## 2017-03-30 PROCEDURE — 81002 URINALYSIS NONAUTO W/O SCOPE: CPT | Performed by: INTERNAL MEDICINE

## 2017-03-30 PROCEDURE — 99000 SPECIMEN HANDLING OFFICE-LAB: CPT | Performed by: INTERNAL MEDICINE

## 2017-03-30 PROCEDURE — 87086 URINE CULTURE/COLONY COUNT: CPT

## 2017-03-30 PROCEDURE — 87077 CULTURE AEROBIC IDENTIFY: CPT

## 2017-03-30 PROCEDURE — 87186 SC STD MICRODIL/AGAR DIL: CPT

## 2017-03-30 PROCEDURE — 99215 OFFICE O/P EST HI 40 MIN: CPT | Mod: 25 | Performed by: INTERNAL MEDICINE

## 2017-03-30 RX ORDER — LISINOPRIL 5 MG/1
5 TABLET ORAL DAILY
Qty: 90 TAB | Refills: 4 | Status: SHIPPED | OUTPATIENT
Start: 2017-03-30 | End: 2017-04-02 | Stop reason: SDUPTHER

## 2017-03-30 RX ORDER — NITROFURANTOIN 25; 75 MG/1; MG/1
100 CAPSULE ORAL DAILY
Qty: 90 CAP | Refills: 4 | Status: SHIPPED | OUTPATIENT
Start: 2017-03-30 | End: 2017-04-02

## 2017-03-30 ASSESSMENT — ENCOUNTER SYMPTOMS
CHILLS: 0
NAUSEA: 0
VOMITING: 0
FLANK PAIN: 0
SWEATS: 0

## 2017-03-30 NOTE — MR AVS SNAPSHOT
Barbara Zaragoza Trevor   3/30/2017 9:20 AM   Office Visit   MRN: [id number]    Department:  [address] [place] [address]   Dept Phone:  [phone]    Description:  Female : 8/10/1928   Provider:  Mahendra Morrow M.D.           Reason for Visit     Dysuria poss uti/frequency      Allergies as of 3/30/2017     Allergen Noted Reactions    Pcn [Penicillins] 2010   Hives      You were diagnosed with     Chronic UTI (urinary tract infection)   [502297]       Mixed hyperlipidemia   [272.2.ICD-9-CM]       Essential hypertension   [6891900]       Urinary frequency   [788.41.ICD-9-CM]         Vital Signs     Blood Pressure Pulse Temperature Height Weight Body Mass Index    112/60 mmHg 59 36.7 °C (98 °F) 1.524 m (5') 49.714 kg (109 lb 9.6 oz) 21.40 kg/m2    Oxygen Saturation Smoking Status                98% Former Smoker          Basic Information     Date Of Birth Sex Race Ethnicity Preferred Language    8/10/1928 Female White Non- English      Your appointments     2017 10:00 AM   FOLLOW UP with Kirsten Garza M.D.   [facility])    [address]  [address]  [facility] [id number] [phone]            2017 10:00 AM   Established Patient with Mahendra Morrow M.D.   [address] [address] [phone]           You will be receiving a confirmation call a few days before your appointment from our automated call confirmation system.            Aug 16, 2017  9:40 AM   Established Patient with Mahendra Morrow M.D.   [address] [address] [phone]           You will be receiving a confirmation call a few days before your appointment from our automated call confirmation system.              Problem List              ICD-10-CM Priority Class Noted - Resolved    Esophageal stricture- s/p dilation   gray K22.2   7/31/2013 - Present    MI (myocardial infarction) (CMS-HCC)- August 2013: Non STEMI. dr scruggs I21.3 High  8/14/2013 - Present    Glaucoma H40.9 Low  10/7/2013 - Present    Chronic atrial fibrillation- rx Eliquis; dr scruggs I48.2 Medium  4/29/2014 - Present    Chronic anticoagulation Z79.01 Low  4/30/2015 - Present    CHF (congestive heart failure)- EF=51% 5/2015; dr scruggs I50.9 High  5/11/2015 - Present    Iron deficiency anemia D50.9 Medium  5/12/2015 - Present    Moderate to severe pulmonary hypertension (CMS-HCC) I27.2 High  5/14/2015 - Present    Coronary artery disease involving native coronary artery of native heart without angina pectoris- non STEMI 2013; no stents or surgery; dr scruggs I25.10   10/6/2015 - Present    CVA, old, speech/language deficit- 2013 I69.328 Low  10/21/2015 - Present    Chronic pain syndrome-Dr. Capellan; tramadol rxd G89.4   4/20/2016 - Present    Urinary frequency R35.0   10/11/2016 - Present    Acute on chronic systolic heart failure (CMS-HCC) I50.23 High  11/5/2016 - Present    CKD (chronic kidney disease), stage II N18.2 Low  11/5/2016 - Present    HLD (hyperlipidemia) E78.5 Low  11/5/2016 - Present    HTN (hypertension) I10 Low  11/5/2016 - Present    H/O valvular heart disease Z86.79 Low  11/5/2016 - Present    CAD (coronary artery disease) I25.10 Low  11/5/2016 - Present    RLS (restless legs syndrome) G25.81 Low  11/5/2016 - Present    Chronic UTI (urinary tract infection) N39.0   3/30/2017 - Present      Health Maintenance        Date Due Completion Dates    IMM DTaP/Tdap/Td Vaccine (1 - Tdap) 8/10/1947 ---    IMM ZOSTER VACCINE 8/10/1988 ---    MAMMOGRAM 3/19/2015 3/19/2014, 2/7/2013, 6/15/2012, 6/12/2012, 1/27/2011, 12/15/2009, 12/15/2009, 11/13/2008, 11/13/2008, 10/9/2007, 10/9/2007, 9/7/2006, 9/7/2006, 8/3/2005    IMM INFLUENZA (1) 9/1/2016 10/28/2013, 10/1/2012    BONE DENSITY 7/8/2019 7/8/2014, 4/12/2011            Current Immunizations     13-VALENT  PCV PREVNAR 12/7/2015, 10/21/2015    Influenza TIV (IM) 10/28/2013, 10/1/2012    Pneumococcal polysaccharide vaccine (PPSV-23) 8/1/2008      Below and/or attached are the medications your provider expects you to take. Review all of your home medications and newly ordered medications with your provider and/or pharmacist. Follow medication instructions as directed by your provider and/or pharmacist. Please keep your medication list with you and share with your provider. Update the information when medications are discontinued, doses are changed, or new medications (including over-the-counter products) are added; and carry medication information at all times in the event of emergency situations     Allergies:  PCN - Hives               Medications  Valid as of: March 30, 2017 - 10:13 AM    Generic Name Brand Name Tablet Size Instructions for use    Apixaban (Tab) ELIQUIS 2.5mg Take 1 Tab by mouth 2 Times a Day.        Betaxolol HCl (Suspension) BETOPTIC-S 0.25 % Place 1 Drop in both eyes every day.        Carvedilol (Tab) COREG 6.25 MG Take 1 Tab by mouth 2 times a day, with meals.        Cholecalciferol (Cap) Vitamin D 2000 UNITS Take 1 Cap by mouth every day.        Docusate Sodium (Cap) COLACE 100 MG Take 100 mg by mouth 2 times a day.        Furosemide (Tab) LASIX 20 MG Take 1 Tab by mouth 2 times a day.        Latanoprost (Solution) XALATAN 0.005 % Place 1 Drop in both eyes every evening.        Lisinopril (Tab) PRINIVIL 5 MG Take 1 Tab by mouth every day.        Nitrofurantoin Monohyd Macro (Cap) MACROBID 100 MG Take 1 Cap by mouth every day.        ROPINIRole HCl (Tab) REQUIP 1 MG Take 1 Tab by mouth 3 times a day.        Rosuvastatin Calcium (Tab) CRESTOR 20 MG Take 1 Tab by mouth every evening.        Spironolactone (Tab) ALDACTONE 50 MG Take 1 Tab by mouth every day.        TraMADol HCl (Tab) ULTRAM 50 MG Take  mg by mouth every four hours as needed. Indications: Moderate to Moderately Severe Pain           .                 Medicines prescribed today were sent to:     [id number] - [place], [place] - [address]    [address] [place] [address]    Phone: [phone] Fax: [phone]    Open 24 Hours?: No      Medication refill instructions:       If your prescription bottle indicates you have medication refills left, it is not necessary to call your provider’s office. Please contact your pharmacy and they will refill your medication.    If your prescription bottle indicates you do not have any refills left, you may request refills at any time through one of the following ways: The online [employer] system (except Urgent Care), by calling your provider’s office, or by asking your pharmacy to contact your provider’s office with a refill request. Medication refills are processed only during regular business hours and may not be available until the next business day. Your provider may request additional information or to have a follow-up visit with you prior to refilling your medication.   *Please Note: Medication refills are assigned a new Rx number when refilled electronically. Your pharmacy may indicate that no refills were authorized even though a new prescription for the same medication is available at the pharmacy. Please request the medicine by name with the pharmacy before contacting your provider for a refill.        Your To Do List     Future Labs/Procedures Complete By Expires    CBC WITH DIFFERENTIAL  As directed 3/30/2018    COMP METABOLIC PANEL  As directed 3/30/2018    LIPID PROFILE  As directed 3/30/2018    URINE CULTURE(NEW)  As directed 3/30/2018      Referral     A referral request has been sent to our patient care coordination department. Please allow 3-5 business days for us to process this request and contact you either by phone or mail. If you do not hear from us by the 5th business day, please call us at [phone].           [employer] Access Code: Activation code not  generated  Current MyChart Status: Active

## 2017-03-30 NOTE — PROGRESS NOTES
Subjective:      Barbara Murray is a 88 y.o. female who presents with Dysuria  and   The patient is here for followup of chronic medical problems listed below. The patient is compliant with medications and having no side effects from them. Denies chest pain, abdominal pain, dyspnea, myalgias, or cough.   Patient Active Problem List    Diagnosis Date Noted   • Acute on chronic systolic heart failure (CMS-HCC) 11/05/2016     Priority: High   • Moderate to severe pulmonary hypertension (CMS-HCC) 05/14/2015     Priority: High   • CHF (congestive heart failure)- EF=51% 5/2015; dr scruggs 05/11/2015     Priority: High   • MI (myocardial infarction) (CMS-HCC)- August 2013: Non STEMI. dr scruggs 08/14/2013     Priority: High   • Iron deficiency anemia 05/12/2015     Priority: Medium   • Chronic atrial fibrillation- rx Eliquis; dr scruggs 04/29/2014     Priority: Medium   • CKD (chronic kidney disease), stage II 11/05/2016     Priority: Low   • HLD (hyperlipidemia) 11/05/2016     Priority: Low   • HTN (hypertension) 11/05/2016     Priority: Low   • H/O valvular heart disease 11/05/2016     Priority: Low   • CAD (coronary artery disease) 11/05/2016     Priority: Low   • RLS (restless legs syndrome) 11/05/2016     Priority: Low   • CVA, old, speech/language deficit- 2013 10/21/2015     Priority: Low   • Chronic anticoagulation 04/30/2015     Priority: Low   • Glaucoma 10/07/2013     Priority: Low   • Chronic UTI (urinary tract infection) 03/30/2017   • Urinary frequency 10/11/2016   • Chronic pain syndrome-Dr. Capellan; tramadol rxd 04/20/2016   • Coronary artery disease involving native coronary artery of native heart without angina pectoris- non STEMI 2013; no stents or surgery; dr scruggs 10/06/2015   • Esophageal stricture- s/p dilation dr foley 07/31/2013     Allergies   Allergen Reactions   • Pcn [Penicillins] Hives     .  Outpatient Prescriptions Prior to Visit   Medication Sig Dispense Refill   • apixaban (ELIQUIS) 2.5mg  Tab Take 1 Tab by mouth 2 Times a Day. 84 Tab 0   • rosuvastatin (CRESTOR) 20 MG Tab Take 1 Tab by mouth every evening. 30 Tab 3   • furosemide (LASIX) 20 MG Tab Take 1 Tab by mouth 2 times a day. 60 Tab 11   • ropinirole (REQUIP) 1 MG Tab Take 1 Tab by mouth 3 times a day. 90 Tab 11   • carvedilol (COREG) 6.25 MG Tab Take 1 Tab by mouth 2 times a day, with meals. 60 Tab 0   • spironolactone (ALDACTONE) 50 MG Tab Take 1 Tab by mouth every day. 30 Tab 0   • tramadol (ULTRAM) 50 MG Tab Take  mg by mouth every four hours as needed. Indications: Moderate to Moderately Severe Pain     • Cholecalciferol (VITAMIN D) 2000 UNITS Cap Take 1 Cap by mouth every day. 100 Cap 4   • docusate sodium (COLACE) 100 MG CAPS Take 100 mg by mouth 2 times a day.     • latanoprost (XALATAN) 0.005 % SOLN Place 1 Drop in both eyes every evening.     • betaxolol (BETOPTIC-S) 0.25 % SUSP Place 1 Drop in both eyes every day.     • nitrofurantoin monohydr macro (MACROBID) 100 MG Cap Take 1 Cap by mouth 2 times a day. 20 Cap 1   • ciprofloxacin (CIPRO) 500 MG Tab Take 500 mg by mouth 2 times a day.     • polyethylene glycol/lytes (MIRALAX) Pack Take 1 Packet by mouth every day. 30 Each 0   • triamcinolone acetonide (KENALOG) 0.1 % Cream Apply 1 Application to affected area(s) 2 times a day. 45 g 0   • lisinopril (PRINIVIL) 5 MG TABS Take 5 mg by mouth every day.       No facility-administered medications prior to visit.               Dysuria   This is a chronic problem. The current episode started more than 1 month ago. The problem occurs every urination. The problem has been gradually worsening. The quality of the pain is described as burning. The pain is at a severity of 3/10. The pain is mild. There has been no fever. She is not sexually active. There is no history of pyelonephritis. Associated symptoms include frequency and urgency. Pertinent negatives include no chills, discharge, flank pain, hematuria, hesitancy, nausea, possible  pregnancy, sweats or vomiting. She has tried antibiotics for the symptoms. The treatment provided mild relief.       Review of Systems   Constitutional: Negative for chills.   Gastrointestinal: Negative for nausea and vomiting.   Genitourinary: Positive for dysuria, urgency and frequency. Negative for hesitancy, hematuria and flank pain.          Objective:     /60 mmHg  Pulse 59  Temp(Src) 36.7 °C (98 °F)  Ht 1.524 m (5')  Wt 49.714 kg (109 lb 9.6 oz)  BMI 21.40 kg/m2  SpO2 98%     Physical Exam  No visits with results within 1 Month(s) from this visit.  Latest known visit with results is:    Hospital Outpatient Visit on 02/15/2017   Component Date Value   • Significant Indicator 02/15/2017 POS    • Source 02/15/2017 UR    • Urine Culture 02/15/2017 *                    Value:Enterococcus faecalis  ,000 cfu/mL        Lab Results   Component Value Date/Time    GLYCOHEMOGLOBIN 6.1* 10/10/2013 05:59 AM    GLYCOHEMOGLOBIN 4.9 04/21/2012 09:36 AM     Lab Results   Component Value Date/Time    SODIUM 138 02/08/2017 09:13 AM    POTASSIUM 4.2 02/08/2017 09:13 AM    CHLORIDE 104 02/08/2017 09:13 AM    CO2 24 02/08/2017 09:13 AM    GLUCOSE 86 02/08/2017 09:13 AM    BUN 28* 02/08/2017 09:13 AM    CREATININE 0.96 02/08/2017 09:13 AM    BUN-CREATININE RATIO 23 07/10/2015 01:18 PM    GLOM FILT RATE, EST 53* 05/03/2010 12:03 PM    ALKALINE PHOSPHATASE 48 02/08/2017 09:13 AM    AST(SGOT) 26 02/08/2017 09:13 AM    ALT(SGPT) 18 02/08/2017 09:13 AM    TOTAL BILIRUBIN 1.0 02/08/2017 09:13 AM     Lab Results   Component Value Date/Time    INR 1.23* 11/04/2016 09:50 AM    INR 1.34* 05/11/2015 07:30 PM    INR 2.44* 09/05/2014 02:42 PM     Lab Results   Component Value Date/Time    CHOLESTEROL,* 02/08/2017 09:13 AM    * 02/08/2017 09:13 AM    HDL 72 02/08/2017 09:13 AM    TRIGLYCERIDES 82 02/08/2017 09:13 AM       No results found for: TESTOSTERONE  Lab Results   Component Value Date/Time    TSH 2.410  05/03/2010 12:03 PM     Lab Results   Component Value Date/Time    FREE T-4 0.91 06/17/2016 09:08 AM    FREE T-4 0.89 12/10/2015 09:23 AM     No results found for: URICACID  No components found for: VITB12  Lab Results   Component Value Date/Time    25-HYDROXY   VITAMIN D 25 31 11/04/2016 09:50 AM    25-HYDROXY   VITAMIN D 25 19* 06/17/2016 09:08 AM                 Assessment/Plan:     1. Chronic UTI (urinary tract infection)- not at goal; will resume Macrobid and continue chronically. Refer to urology      - nitrofurantoin monohydr macro (MACROBID) 100 MG Cap; Take 1 Cap by mouth every day.  Dispense: 90 Cap; Refill: 4  - REFERRAL TO UROLOGY  - URINE CULTURE(NEW); Future  - POCT Urinalysis    2. Mixed hyperlipidemia    Under good control. Continue same regimen.      - COMP METABOLIC PANEL; Future  - LIPID PROFILE; Future  - CBC WITH DIFFERENTIAL; Future    3. Essential hypertensionUnder good control. Continue same regimen.   - lisinopril (PRINIVIL) 5 MG Tab; Take 1 Tab by mouth every day.  Dispense: 90 Tab; Refill: 4    4. Urinary frequency-as above            - REFERRAL TO UROLOGY    5. Hypertension under good control. Continue lisinopril.    6. Dyslipidemia-under good control. Continue Crestor 20 mg daily.    7. Chronic pain syndrome followed by pain management and controlled on tramadol with Dr. Capellan.    8. Restless leg syndrome-well-controlled on ropinirole. Continue unchanged    9. Chronic atrial fibrillation with controlled ventricular rate under good control. Continue anticoagulation therapy with L Barnes and rate control with carvedilol. Followed by cardiology.    10. Chronic systolic CHF well compensated and under good control on spironolactone and carvedilol and lisinopril. Continue unchanged. Most recent echocardiogram showed estimated ejection fraction = 50% in November 2016. Follow-up with cardiology as she's doing regularly.          40 minute face-to-face encounter took place today.  More than half  of this time was spent in the coordination of care of the above problems, as well as counseling.

## 2017-04-01 LAB
BACTERIA UR CULT: ABNORMAL
SIGNIFICANT IND 70042: ABNORMAL
SOURCE SOURCE: ABNORMAL

## 2017-04-02 DIAGNOSIS — N39.0 CHRONIC UTI (URINARY TRACT INFECTION): ICD-10-CM

## 2017-04-02 DIAGNOSIS — I10 ESSENTIAL HYPERTENSION: ICD-10-CM

## 2017-04-02 RX ORDER — SULFAMETHOXAZOLE AND TRIMETHOPRIM 800; 160 MG/1; MG/1
1 TABLET ORAL DAILY
Qty: 100 TAB | Refills: 0 | Status: SHIPPED | OUTPATIENT
Start: 2017-04-02 | End: 2017-06-19

## 2017-04-02 RX ORDER — LISINOPRIL 5 MG/1
5 TABLET ORAL DAILY
Qty: 90 TAB | Refills: 4 | Status: SHIPPED | OUTPATIENT
Start: 2017-04-02 | End: 2018-01-01 | Stop reason: SDUPTHER

## 2017-04-20 DIAGNOSIS — I10 ESSENTIAL HYPERTENSION: ICD-10-CM

## 2017-04-21 RX ORDER — CARVEDILOL 6.25 MG/1
6.25 TABLET ORAL 2 TIMES DAILY WITH MEALS
Qty: 60 TAB | Refills: 11 | Status: SHIPPED | OUTPATIENT
Start: 2017-04-21 | End: 2017-06-19 | Stop reason: SDUPTHER

## 2017-05-01 ENCOUNTER — TELEPHONE (OUTPATIENT)
Dept: MEDICAL GROUP | Age: 82
End: 2017-05-01

## 2017-05-01 NOTE — TELEPHONE ENCOUNTER
1. Caller Name: Barbara Zaragoza                                         Call Back Number: [phone]      Patient approves a detailed voicemail message: no    Patient left VM needing to know if she needs to keep both of her appointments or if one appointment is sufficient.  Please advise.  Future Appointments       Provider Department [place]    6/16/2017 10:00 AM [facility]D. [facility]     7/6/2017 10:00 AM Mahendra Morrow M.D. [facility]    8/16/2017 9:40 AM Mahendra Morrow M.D. [facility] [place]

## 2017-05-01 NOTE — TELEPHONE ENCOUNTER
Phone Number Called: [phone] (home)    Message: August appointment has been cancelled. It was just a follow up.    Left Message for patient to call back: no

## 2017-06-08 ENCOUNTER — HOSPITAL ENCOUNTER (OUTPATIENT)
Dept: LAB | Facility: MEDICAL CENTER | Age: 82
End: 2017-06-08
Attending: INTERNAL MEDICINE
Payer: MEDICARE

## 2017-06-08 DIAGNOSIS — I25.10 CORONARY ARTERY DISEASE INVOLVING NATIVE CORONARY ARTERY OF NATIVE HEART WITHOUT ANGINA PECTORIS: ICD-10-CM

## 2017-06-08 DIAGNOSIS — I25.10 CORONARY ARTERY DISEASE DUE TO LIPID RICH PLAQUE: ICD-10-CM

## 2017-06-08 DIAGNOSIS — I25.83 CORONARY ARTERY DISEASE DUE TO LIPID RICH PLAQUE: ICD-10-CM

## 2017-06-08 LAB
CHOLEST SERPL-MCNC: 201 MG/DL (ref 100–199)
HDLC SERPL-MCNC: 66 MG/DL
LDLC SERPL CALC-MCNC: 119 MG/DL
TRIGL SERPL-MCNC: 80 MG/DL (ref 0–149)

## 2017-06-08 PROCEDURE — 36415 COLL VENOUS BLD VENIPUNCTURE: CPT

## 2017-06-08 PROCEDURE — 80061 LIPID PANEL: CPT

## 2017-06-14 ENCOUNTER — HOSPITAL ENCOUNTER (OUTPATIENT)
Dept: RADIOLOGY | Facility: MEDICAL CENTER | Age: 82
End: 2017-06-14
Attending: INTERNAL MEDICINE
Payer: MEDICARE

## 2017-06-14 DIAGNOSIS — R91.8: ICD-10-CM

## 2017-06-14 PROCEDURE — 71250 CT THORAX DX C-: CPT

## 2017-06-16 ENCOUNTER — OFFICE VISIT (OUTPATIENT)
Dept: CARDIOLOGY | Facility: MEDICAL CENTER | Age: 82
End: 2017-06-16
Payer: MEDICARE

## 2017-06-16 VITALS
SYSTOLIC BLOOD PRESSURE: 176 MMHG | BODY MASS INDEX: 21.2 KG/M2 | HEART RATE: 78 BPM | WEIGHT: 108 LBS | DIASTOLIC BLOOD PRESSURE: 70 MMHG | OXYGEN SATURATION: 93 % | HEIGHT: 60 IN

## 2017-06-16 DIAGNOSIS — I10 ESSENTIAL HYPERTENSION: ICD-10-CM

## 2017-06-16 DIAGNOSIS — I48.20 CHRONIC ATRIAL FIBRILLATION (HCC): ICD-10-CM

## 2017-06-16 DIAGNOSIS — E78.2 MIXED HYPERLIPIDEMIA: ICD-10-CM

## 2017-06-16 DIAGNOSIS — Z79.01 CHRONIC ANTICOAGULATION: ICD-10-CM

## 2017-06-16 DIAGNOSIS — I25.83 CORONARY ARTERY DISEASE DUE TO LIPID RICH PLAQUE: ICD-10-CM

## 2017-06-16 DIAGNOSIS — Z79.899 HIGH RISK MEDICATION USE: ICD-10-CM

## 2017-06-16 DIAGNOSIS — I25.10 CORONARY ARTERY DISEASE DUE TO LIPID RICH PLAQUE: ICD-10-CM

## 2017-06-16 PROCEDURE — 99214 OFFICE O/P EST MOD 30 MIN: CPT | Performed by: INTERNAL MEDICINE

## 2017-06-16 ASSESSMENT — ENCOUNTER SYMPTOMS
CLAUDICATION: 0
PALPITATIONS: 0
ABDOMINAL PAIN: 0
SHORTNESS OF BREATH: 0
DEPRESSION: 0
ORTHOPNEA: 0
EYE PAIN: 0
CHILLS: 0
SPEECH CHANGE: 0
LOSS OF CONSCIOUSNESS: 0
FEVER: 0
BLOOD IN STOOL: 0
PND: 0
EYE DISCHARGE: 0
BLURRED VISION: 0
BRUISES/BLEEDS EASILY: 0
MYALGIAS: 0
NERVOUS/ANXIOUS: 0
DIZZINESS: 0

## 2017-06-16 NOTE — PROGRESS NOTES
Subjective:   Barbara Murray is a pleasant 88-year-old female with known history of chronic atrial fibrillation on Eliquis for anticoagulation, history of recurrent GI bleeds, prior history of CVA, CAD status post cath 8/4/13, which showed 40-50% disease in distal PDA, D1 ostial 40-50%, RCA ostial 60-70%, cardiomyopathy LVEF of 40%, hypertension, dyslipidemia presenting today for follow-up evaluation of CHF and atrial fibrillation.     Patient is here today with her friend who mentioned that she is taking all of her medications without fail. Patient denied any complaints of exertional chest pain pressure or tightness. Unclear whether patient is taking Crestor or not. No complaints of cough while on lisinopril. Denied any bleeding issues while on Eliquis. No complaints of dizziness lightheadedness or LOC. No complaints of lower extremity edema or claudication.       Past Medical History   Diagnosis Date   • CAD (coronary artery disease) August 2013     Coronary angiogram with 40-50% stenosis of distal PDA, diagonal ostial 40-50%, RCA ostial 60-70%. LVEF 45-50%   • Other malaise and fatigue    • Reflux esophagitis    • Arrest of bone development or growth    • Symptomatic menopausal or female climacteric states    • Disorder of bone and cartilage, unspecified    • Disorders of bursae and tendons in shoulder region, unspecified    • Degeneration of lumbar or lumbosacral intervertebral disc    • Other extrapyramidal disease and abnormal movement disorder    • Cellulitis and abscess of upper arm and forearm    • Rotator cuff (capsule) sprain    • Hypertonicity of bladder    • Osteoporosis, unspecified    • Anesthesia      During cataract procdure, not completely effective   • Unspecified hemorrhagic conditions (CMS-[facility])      Nosebleeds   • Unspecified urinary incontinence    • Other specified disorder of intestines      Diarrhea   • Pneumonia    • Glaucoma    • Arrhythmia    • CATARACT      Status post removal   •  Hypertension    • Abnormal EKG    • Atrial fibrillation (CMS-HCC) September 2014     Echocardiogram with LVEF 45-50%, mild MS, moderate MR, moderate TR. RVSP 50-55mmHg.   • Hyperlipidemia    • Arthritis    • MI (myocardial infarction) (CMS-HCC)     • CHF (congestive heart failure) (CMS-HCC) 5/11/2015   • Hiatus hernia syndrome    • Stroke (CMS-HCC) August 2013     speech deficit   • Heart burn    • Pain      low back,pain scale 7   • Backpain    • Pain      R leg, R hand   • High cholesterol    • Coronary heart disease    • Crohns disease (CMS-HCC)    • Rheumatoid arthritis (CMS-HCC)    • Restless leg syndrome    • Chronic UTI (urinary tract infection) 3/30/2017     Past Surgical History   Procedure Laterality Date   • Tonsillectomy  1952   • Fannie by laparoscopy  1994   • Cataract phaco with iol  2007   • Rotator cuff repair Right 2008     dr bills   • Other       Epidural    • Other  1935     Tube insertion,left lung   • Other       Spinal meningitis   • Knee arthroplasty total  6/21/2010     Performed by INNA BILLS at SURGERY [facility]   • Inguinal hernia repair bilateral  3/2/2011     Performed by NIKOLE BELTRAN at SURGERY [facility]   • Gastroscopy with balloon dilatation  7/25/2013     Performed by Jed Hoyt Jr., M.D. at SURGERY [facility] [place]   • Carpal tunnel release Right 10/20/2015     Procedure: CARPAL TUNNEL RELEASE OPEN;  Surgeon: Ar Macario M.D.;  Location: [facility];  Service:    • Trigger finger release Right 10/20/2015     Procedure: TRIGGER FINGER RELEASE MIDDLE AND RING;  Surgeon: Ar Macario M.D.;  Location: [facility];  Service:    • Cholecystectomy     • Pr remv 2nd cataract,corn-scler sectn     • Arthroscopy, knee       Family History   Problem Relation Age of Onset   • Heart Disease Mother    • Heart Failure Mother    • Lung Disease Father    • Stroke Sister      History   Smoking status   • Former Smoker -- 2 years   • Types:  Cigarettes   • Start date: 01/01/1956   • Quit date: 11/19/1958   Smokeless tobacco   • Never Used     Comment: .5 ppd 9 yrs,quit 1966     Allergies   Allergen Reactions   • Pcn [Penicillins] Hives     Outpatient Encounter Prescriptions as of 6/16/2017   Medication Sig Dispense Refill   • rosuvastatin (CRESTOR) 20 MG Tab TAKE 1 TABLET ORALLY DAILY EVERY EVENING 90 Tab 3   • carvedilol (COREG) 6.25 MG Tab Take 1 Tab by mouth 2 times a day, with meals. 60 Tab 11   • lisinopril (PRINIVIL) 5 MG Tab Take 1 Tab by mouth every day. 90 Tab 4   • apixaban (ELIQUIS) 2.5mg Tab Take 1 Tab by mouth 2 Times a Day. 84 Tab 0   • furosemide (LASIX) 20 MG Tab Take 1 Tab by mouth 2 times a day. 60 Tab 11   • ropinirole (REQUIP) 1 MG Tab Take 1 Tab by mouth 3 times a day. 90 Tab 11   • tramadol (ULTRAM) 50 MG Tab Take  mg by mouth every four hours as needed. Indications: Moderate to Moderately Severe Pain     • Cholecalciferol (VITAMIN D) 2000 UNITS Cap Take 1 Cap by mouth every day. 100 Cap 4   • docusate sodium (COLACE) 100 MG CAPS Take 100 mg by mouth 2 times a day.     • latanoprost (XALATAN) 0.005 % SOLN Place 1 Drop in both eyes every evening.     • betaxolol (BETOPTIC-S) 0.25 % SUSP Place 1 Drop in both eyes every day.     • sulfamethoxazole-trimethoprim (BACTRIM DS) 800-160 MG tablet Take 1 Tab by mouth every day. But initially Take one bid for 10 days, then switch to to a 1 daily for 3 months; REPLACES NITROFURANTOIN 100 Tab 0   • spironolactone (ALDACTONE) 50 MG Tab Take 1 Tab by mouth every day. 30 Tab 0     No facility-administered encounter medications on file as of 6/16/2017.     Review of Systems   Constitutional: Negative for fever and chills.   HENT: Negative for congestion.    Eyes: Negative for blurred vision, pain and discharge.   Respiratory: Negative for shortness of breath.    Cardiovascular: Negative for chest pain (Right-sided chest pain secondary to rib fractures), palpitations, orthopnea, claudication,  leg swelling (Improved) and PND.   Gastrointestinal: Negative for abdominal pain and blood in stool.   Genitourinary: Negative for hematuria.   Musculoskeletal: Positive for joint pain (right wrist pain). Negative for myalgias.   Skin: Negative for rash. Itching: secondary to dry skin.   Neurological: Negative for dizziness, speech change and loss of consciousness.   Endo/Heme/Allergies: Does not bruise/bleed easily.   Psychiatric/Behavioral: Negative for depression. The patient is not nervous/anxious.    All other systems reviewed and are negative.       Objective:   /68 mmHg  Pulse 78  Ht 1.524 m (5')  Wt 48.988 kg (108 lb)  BMI 21.09 kg/m2  SpO2 93%    Physical Exam   Constitutional: She is oriented to person, place, and time. She appears well-developed. No distress.   HENT:   Mouth/Throat: Mucous membranes are normal.   Eyes: Conjunctivae and EOM are normal.   Neck: No JVD present. No tracheal deviation present. No thyroid mass present.   Cardiovascular: Normal rate.  An irregularly irregular rhythm present.   Murmur heard.   Systolic murmur is present with a grade of 3/6   Systolic murmur heard in the tricuspid and mitral area     Pulmonary/Chest: Effort normal. No respiratory distress. She exhibits no tenderness.   Decreased breath sounds at bases   Abdominal: Soft. There is no tenderness.   Musculoskeletal: Normal range of motion. She exhibits no edema.   Neurological: She is alert and oriented to person, place, and time. She has normal strength. She displays no tremor.   Skin: Skin is warm and dry. She is not diaphoretic.   Psychiatric: She has a normal mood and affect. Her behavior is normal.   Vitals reviewed.    Results for AUGUSTIN OLY FITZPATRICK (MRN [id number]) as of 6/16/2017 16:47   2/8/2017 6/8/2017    Sodium 138    Potassium 4.2    Chloride 104    Co2 24    Anion Gap 10.0    Glucose 86    Bun 28 (H)    Creatinine 0.96    GFR If Non African American 55 (A)    Calcium 10.0    AST(SGOT) 26     ALT(SGPT) 18    Alkaline Phosphatase 48    Cholesterol,Tot 215 (H) 201 (H)   Triglycerides 82 80   HDL 72 66    (H) 119 (H)     TTE: 11/4/16  Left ventriicular ejection fraction is visually estimated to be 50%.   The presence of atrial fibrillation might underestimate left ventricular systolic function.  Mild concentric left ventricular hypertrophy. Diastolic function is difficult to assess with atrial fibrillation.  Right ventricular systolic pressure is estimated to be 64 mmHg.  Severely dilated right ventricle.  Moderate to severe tricuspid regurgitation with eccentric jet.  Severe biatrial enlargement.  Compared to the images of the prior study done -  there has been no significant change.     TTE: 5/12/15  Normal left ventricular chamber size. Moderate concentric left ventricular hypertrophy. Abnormal (paradoxical) septal motion consistent elevated RVSP. Low LVOT TVI c/w low stroke volume  Severely dilated right ventricle. LVEF 40%.  Severely dilated right atrium.  Severely dilated left atrium. Left atrial volume index is 102 ml/sq m.  Mild calcific mitral stenosis. Mean transvalvular gradient is 5 mmHg. Mild mitral regurgitation.  Mild aortic insufficiency.  Severe tricuspid regurgitation. Dilated IVC without collapse, right ventricular systolic pressure is   estimated to be 65-70 mmHg.    Nuclear stress test: 12/12/14   Lexiscan stress negative for angina and ischemic ECG changes.  No evidence of significant jeopardized viable myocardium or prior myocardial infarction. Prominent apical thinning noted.  Normal left ventricular size, ejection fraction, and wall motion    TTE: 9/6/14   Left ventricular ejection fraction is mildly reduced and is visually esimtated to be 45%-50%.  Mildly dilated right ventricle. Normal right ventricular systolic function.  Severe biatrial enlargement  Mild mitral stenosis  Moderate Mitral regurgitation  Moderate tricuspid regurgitation. Right ventricular systolic pressure  is estimated to be 50 to 55 mmHg consistent with moderate pulmonary hypertension.  Normal pericardium without effusion.  Compared to the report of 8/2/2013, the EF is now reduced and there is moderate mitral regurgitation.    Carotid doppler: 8/2/13   Mild bilateral internal carotid artery stenosis (<50%).   Antegrade flow Right vertebral artery.   Left vertebral artery could not be visualized     Coronary angiogram: 8/4/13   LVEDP 8 without gradient across aortic valve on pullback   LM bifurcating into LAD and LCX without disease.   LCX dominant vessel gives off several OM branches. Also gives off PDA and PLV. 40-50% disease in the distal PDA with JEFFERY 3 flow.   LAD gives off a medium D1, small to medium D2-3, no significant disease, D1 has ostial 40-50% stenosis   RCA ostial 60-70% with JEFFERY 3 flow.     TTE: 7/22/13   Normal left ventricular size and function. Left ventricular ejection fraction is 55% to 60%.  Moderate mitral stenosis based on mean mitral valve gradient criteria; Mitral valve area was not calculated.  Severe tricuspid regurgitation.  Right ventricular systolic pressure is estimated to be 53-58 mmHg consistent with moderate pulmonary hypertension.  Severely dilated right atrium.  Severely dilated left atrium.    Nuclear stress test: 2/28/11   Extending to his chest discomfort without ST-T wave changes.   Normal myocardial perfusion images with no evidence of significant ischemia or infarction.   Normal gated SPECT study    Assessment:   1. Chronic atrial fibrillation   2. CAD distal PAD 40-50% disease,D1 ostial 40-50% disease, RCA ostial 60-70% disease   3. Systolic CHF of 40%   4. Dyslipidemia   5. Hypertension  7. Chronic anticoagulation  8. Pulmonary hypertension moderate to severe  Medical Decision Making:  Today's Assessment / Status / Plan:     1. Rate controlled.  Continue Coreg 6.25 mg BID.  2. Stable for now. Patient not on aspirin as she is currently on anticoagulation with Eliquis.  3.  Patient appears euvolemic today.  Continue Aldactone 50 mg daily.  Continue Lasix 20 mg daily.  4. Unclear whether patient is on a statin or not.   Will call back on Monday to get updated list of medications.   5. Blood pressure is elevated today.  Advised patient to monitor blood pressure at home based on the blood pressure recordings will titrate antihypertensive agents.  Continue lisinopril 5 mg daily.  Continue Aldactone 50 mg daily.   Continue Coreg 6.25 mg BID.  6. Repeat CBC count if patient is becoming anemic will discuss with her regarding discontinuing anticoagulation at next visit.  7. Continue eliquis 2.5 mg BID (age more than 80 and weight less than 60 kg).  8. We'll continue with medical management patient doesn't want any aggressive measures.    Followup 3 months  Labs prior to next visit.    Patient is DNR/DNI.    Thank you for allowing me to participate in taking care of patient.    Kirsten Schaefer MD.

## 2017-06-16 NOTE — Clinical Note
[facility]   [address]   [address]  [place] [address]  Phone: [phone]  Fax: [phone]              Barbara Murray  8/10/1928    Encounter Date: 6/16/2017    Kirsten Garza M.D.          PROGRESS NOTE:  Subjective:   Barbara Murray is a pleasant 88-year-old female with known history of chronic atrial fibrillation on Eliquis for anticoagulation, history of recurrent GI bleeds, prior history of CVA, CAD status post cath 8/4/13, which showed 40-50% disease in distal PDA, D1 ostial 40-50%, RCA ostial 60-70%, cardiomyopathy LVEF of 40%, hypertension, dyslipidemia presenting today for follow-up evaluation of CHF and atrial fibrillation.     Patient is here today with her friend who mentioned that she is taking all of her medications without fail. Patient denied any complaints of exertional chest pain pressure or tightness. Unclear whether patient is taking Crestor or not. No complaints of cough while on lisinopril. Denied any bleeding issues while on Eliquis. No complaints of dizziness lightheadedness or LOC. No complaints of lower extremity edema or claudication.       Past Medical History   Diagnosis Date   • CAD (coronary artery disease) August 2013     Coronary angiogram with 40-50% stenosis of distal PDA, diagonal ostial 40-50%, RCA ostial 60-70%. LVEF 45-50%   • Other malaise and fatigue    • Reflux esophagitis    • Arrest of bone development or growth    • Symptomatic menopausal or female climacteric states    • Disorder of bone and cartilage, unspecified    • Disorders of bursae and tendons in shoulder region, unspecified    • Degeneration of lumbar or lumbosacral intervertebral disc    • Other extrapyramidal disease and abnormal movement disorder    • Cellulitis and abscess of upper arm and forearm    • Rotator cuff (capsule) sprain    • Hypertonicity of bladder    • Osteoporosis, unspecified    • Anesthesia      During cataract  procdure, not completely effective   • Unspecified hemorrhagic conditions (CMS-HCC)      Nosebleeds   • Unspecified urinary incontinence    • Other specified disorder of intestines      Diarrhea   • Pneumonia    • Glaucoma    • Arrhythmia    • CATARACT      Status post removal   • Hypertension    • Abnormal EKG    • Atrial fibrillation (CMS-HCC) September 2014     Echocardiogram with LVEF 45-50%, mild MS, moderate MR, moderate TR. RVSP 50-55mmHg.   • Hyperlipidemia    • Arthritis    • MI (myocardial infarction) (CMS-HCC)     • CHF (congestive heart failure) (CMS-HCC) 5/11/2015   • Hiatus hernia syndrome    • Stroke (CMS-HCC) August 2013     speech deficit   • Heart burn    • Pain      low back,pain scale 7   • Backpain    • Pain      R leg, R hand   • High cholesterol    • Coronary heart disease    • Crohns disease (CMS-HCC)    • Rheumatoid arthritis (CMS-HCC)    • Restless leg syndrome    • Chronic UTI (urinary tract infection) 3/30/2017     Past Surgical History   Procedure Laterality Date   • Tonsillectomy  1952   • Fannie by laparoscopy  1994   • Cataract phaco with iol  2007   • Rotator cuff repair Right 2008     dr bills   • Other       Epidural    • Other  1935     Tube insertion,left lung   • Other       Spinal meningitis   • Knee arthroplasty total  6/21/2010     Performed by INNA BILLS at [facility]   • Inguinal hernia repair bilateral  3/2/2011     Performed by NIKOLE BELTRAN at [facility]   • Gastroscopy with balloon dilatation  7/25/2013     Performed by Jed Hoyt Jr., M.D. at [facility]   • Carpal tunnel release Right 10/20/2015     Procedure: CARPAL TUNNEL RELEASE OPEN;  Surgeon: Ar Macario M.D.;  Location: [facility];  Service:    • Trigger finger release Right 10/20/2015     Procedure: TRIGGER FINGER RELEASE MIDDLE AND RING;  Surgeon: Ar Macario M.D.;  Location: [facility];  Service:    • Cholecystectomy        • Pr remv 2nd cataract,corn-scler sectn     • Arthroscopy, knee       Family History   Problem Relation Age of Onset   • Heart Disease Mother    • Heart Failure Mother    • Lung Disease Father    • Stroke Sister      History   Smoking status   • Former Smoker -- 2 years   • Types: Cigarettes   • Start date: 01/01/1956   • Quit date: 11/19/1958   Smokeless tobacco   • Never Used     Comment: .5 ppd 9 yrs,quit 1966     Allergies   Allergen Reactions   • Pcn [Penicillins] Hives     Outpatient Encounter Prescriptions as of 6/16/2017   Medication Sig Dispense Refill   • rosuvastatin (CRESTOR) 20 MG Tab TAKE 1 TABLET ORALLY DAILY EVERY EVENING 90 Tab 3   • carvedilol (COREG) 6.25 MG Tab Take 1 Tab by mouth 2 times a day, with meals. 60 Tab 11   • lisinopril (PRINIVIL) 5 MG Tab Take 1 Tab by mouth every day. 90 Tab 4   • apixaban (ELIQUIS) 2.5mg Tab Take 1 Tab by mouth 2 Times a Day. 84 Tab 0   • furosemide (LASIX) 20 MG Tab Take 1 Tab by mouth 2 times a day. 60 Tab 11   • ropinirole (REQUIP) 1 MG Tab Take 1 Tab by mouth 3 times a day. 90 Tab 11   • tramadol (ULTRAM) 50 MG Tab Take  mg by mouth every four hours as needed. Indications: Moderate to Moderately Severe Pain     • Cholecalciferol (VITAMIN D) 2000 UNITS Cap Take 1 Cap by mouth every day. 100 Cap 4   • docusate sodium (COLACE) 100 MG CAPS Take 100 mg by mouth 2 times a day.     • latanoprost (XALATAN) 0.005 % SOLN Place 1 Drop in both eyes every evening.     • betaxolol (BETOPTIC-S) 0.25 % SUSP Place 1 Drop in both eyes every day.     • sulfamethoxazole-trimethoprim (BACTRIM DS) 800-160 MG tablet Take 1 Tab by mouth every day. But initially Take one bid for 10 days, then switch to to a 1 daily for 3 months; REPLACES NITROFURANTOIN 100 Tab 0   • spironolactone (ALDACTONE) 50 MG Tab Take 1 Tab by mouth every day. 30 Tab 0     No facility-administered encounter medications on file as of 6/16/2017.     Review of Systems   Constitutional: Negative for fever and  chills.   HENT: Negative for congestion.    Eyes: Negative for blurred vision, pain and discharge.   Respiratory: Negative for shortness of breath.    Cardiovascular: Negative for chest pain (Right-sided chest pain secondary to rib fractures), palpitations, orthopnea, claudication, leg swelling (Improved) and PND.   Gastrointestinal: Negative for abdominal pain and blood in stool.   Genitourinary: Negative for hematuria.   Musculoskeletal: Positive for joint pain (right wrist pain). Negative for myalgias.   Skin: Negative for rash. Itching: secondary to dry skin.   Neurological: Negative for dizziness, speech change and loss of consciousness.   Endo/Heme/Allergies: Does not bruise/bleed easily.   Psychiatric/Behavioral: Negative for depression. The patient is not nervous/anxious.    All other systems reviewed and are negative.       Objective:   /68 mmHg  Pulse 78  Ht 1.524 m (5')  Wt 48.988 kg (108 lb)  BMI 21.09 kg/m2  SpO2 93%    Physical Exam   Constitutional: She is oriented to person, place, and time. She appears well-developed. No distress.   HENT:   Mouth/Throat: Mucous membranes are normal.   Eyes: Conjunctivae and EOM are normal.   Neck: No JVD present. No tracheal deviation present. No thyroid mass present.   Cardiovascular: Normal rate.  An irregularly irregular rhythm present.   Murmur heard.   Systolic murmur is present with a grade of 3/6   Systolic murmur heard in the tricuspid and mitral area     Pulmonary/Chest: Effort normal. No respiratory distress. She exhibits no tenderness.   Decreased breath sounds at bases   Abdominal: Soft. There is no tenderness.   Musculoskeletal: Normal range of motion. She exhibits no edema.   Neurological: She is alert and oriented to person, place, and time. She has normal strength. She displays no tremor.   Skin: Skin is warm and dry. She is not diaphoretic.   Psychiatric: She has a normal mood and affect. Her behavior is normal.   Vitals  reviewed.    Results for OLY AUGUSTIN (MRN [id number]) as of 6/16/2017 16:47   2/8/2017 6/8/2017    Sodium 138    Potassium 4.2    Chloride 104    Co2 24    Anion Gap 10.0    Glucose 86    Bun 28 (H)    Creatinine 0.96    GFR If Non African American 55 (A)    Calcium 10.0    AST(SGOT) 26    ALT(SGPT) 18    Alkaline Phosphatase 48    Cholesterol,Tot 215 (H) 201 (H)   Triglycerides 82 80   HDL 72 66    (H) 119 (H)     TTE: 11/4/16  Left ventriicular ejection fraction is visually estimated to be 50%.   The presence of atrial fibrillation might underestimate left ventricular systolic function.  Mild concentric left ventricular hypertrophy. Diastolic function is difficult to assess with atrial fibrillation.  Right ventricular systolic pressure is estimated to be 64 mmHg.  Severely dilated right ventricle.  Moderate to severe tricuspid regurgitation with eccentric jet.  Severe biatrial enlargement.  Compared to the images of the prior study done -  there has been no significant change.     TTE: 5/12/15  Normal left ventricular chamber size. Moderate concentric left ventricular hypertrophy. Abnormal (paradoxical) septal motion consistent elevated RVSP. Low LVOT TVI c/w low stroke volume  Severely dilated right ventricle. LVEF 40%.  Severely dilated right atrium.  Severely dilated left atrium. Left atrial volume index is 102 ml/sq m.  Mild calcific mitral stenosis. Mean transvalvular gradient is 5 mmHg. Mild mitral regurgitation.  Mild aortic insufficiency.  Severe tricuspid regurgitation. Dilated IVC without collapse, right ventricular systolic pressure is   estimated to be 65-70 mmHg.    Nuclear stress test: 12/12/14   Lexiscan stress negative for angina and ischemic ECG changes.  No evidence of significant jeopardized viable myocardium or prior myocardial infarction. Prominent apical thinning noted.  Normal left ventricular size, ejection fraction, and wall motion    TTE: 9/6/14   Left ventricular ejection  fraction is mildly reduced and is visually esimtated to be 45%-50%.  Mildly dilated right ventricle. Normal right ventricular systolic function.  Severe biatrial enlargement  Mild mitral stenosis  Moderate Mitral regurgitation  Moderate tricuspid regurgitation. Right ventricular systolic pressure is estimated to be 50 to 55 mmHg consistent with moderate pulmonary hypertension.  Normal pericardium without effusion.  Compared to the report of 8/2/2013, the EF is now reduced and there is moderate mitral regurgitation.    Carotid doppler: 8/2/13   Mild bilateral internal carotid artery stenosis (<50%).   Antegrade flow Right vertebral artery.   Left vertebral artery could not be visualized     Coronary angiogram: 8/4/13   LVEDP 8 without gradient across aortic valve on pullback   LM bifurcating into LAD and LCX without disease.   LCX dominant vessel gives off several OM branches. Also gives off PDA and PLV. 40-50% disease in the distal PDA with JEFFERY 3 flow.   LAD gives off a medium D1, small to medium D2-3, no significant disease, D1 has ostial 40-50% stenosis   RCA ostial 60-70% with JEFFERY 3 flow.     TTE: 7/22/13   Normal left ventricular size and function. Left ventricular ejection fraction is 55% to 60%.  Moderate mitral stenosis based on mean mitral valve gradient criteria; Mitral valve area was not calculated.  Severe tricuspid regurgitation.  Right ventricular systolic pressure is estimated to be 53-58 mmHg consistent with moderate pulmonary hypertension.  Severely dilated right atrium.  Severely dilated left atrium.    Nuclear stress test: 2/28/11   Extending to his chest discomfort without ST-T wave changes.   Normal myocardial perfusion images with no evidence of significant ischemia or infarction.   Normal gated SPECT study    Assessment:   1. Chronic atrial fibrillation   2. CAD distal PAD 40-50% disease,D1 ostial 40-50% disease, RCA ostial 60-70% disease   3. Systolic CHF of 40%   4. Dyslipidemia   5.  Hypertension  7. Chronic anticoagulation  8. Pulmonary hypertension moderate to severe  Medical Decision Making:  Today's Assessment / Status / Plan:     1. Rate controlled.  Continue Coreg 6.25 mg BID.  2. Stable for now. Patient not on aspirin as she is currently on anticoagulation with Eliquis.  3. Patient appears euvolemic today.  Continue Aldactone 50 mg daily.  Continue Lasix 20 mg daily.  4. Unclear whether patient is on a statin or not.   Will call back on Monday to get updated list of medications.   5. Blood pressure is elevated today.  Advised patient to monitor blood pressure at home based on the blood pressure recordings will titrate antihypertensive agents.  Continue lisinopril 5 mg daily.  Continue Aldactone 50 mg daily.   Continue Coreg 6.25 mg BID.  6. Repeat CBC count if patient is becoming anemic will discuss with her regarding discontinuing anticoagulation at next visit.  7. Continue eliquis 2.5 mg BID (age more than 80 and weight less than 60 kg).  8. We'll continue with medical management patient doesn't want any aggressive measures.    Followup 3 months  Labs prior to next visit.    Patient is DNR/DNI.    Thank you for allowing me to participate in taking care of patient.    Kirsten Garza. MD.        Mahendra Morrow M.D.  [address] [place] [address] [address] [place] [address]  VIA In Basket

## 2017-06-16 NOTE — MR AVS SNAPSHOT
Barbaraangela Zaragoza Trevor   2017 10:00 AM   Office Visit   MRN: [id number]    Department:  [facility]   Dept Phone:  [phone]    Description:  Female : 8/10/1928   Provider:  Kirsten Scruggs M.D.           Allergies as of 2017     Allergen Noted Reactions    Pcn [Penicillins] 2010   Hives      You were diagnosed with     Chronic atrial fibrillation (CMS-HCC)   [474007]         Vital Signs     Blood Pressure Pulse Height Weight Body Mass Index Oxygen Saturation    176/70 mmHg 78 1.524 m (5') 48.988 kg (108 lb) 21.09 kg/m2 93%    Smoking Status                   Former Smoker           Basic Information     Date Of Birth Sex Race Ethnicity Preferred Language    8/10/1928 Female White Non- English      Your appointments     2017 10:40 AM   Established Patient Pul with RADHA Sumner   [facility] (--)    [address] [facility] [address]  [place] [id number] [phone]            2017 10:00 AM   Established Patient with Mahendra Morrow M.D.   [facility] [address] [facility]    [address]  [place] [address] [phone]           You will be receiving a confirmation call a few days before your appointment from our automated call confirmation system.              Problem List              ICD-10-CM Priority Class Noted - Resolved    Esophageal stricture- s/p dilation dr foley K22.2   2013 - Present    MI (myocardial infarction) (CMS-HCC)- 2013: Non STEMI. dr scruggs I21.3 High  2013 - Present    Glaucoma H40.9 Low  10/7/2013 - Present    Chronic atrial fibrillation- rx Eliquis; dr scruggs I48.2 Medium  2014 - Present    Chronic anticoagulation- Eliquis (for A.Fib)- dr scruggs Z79.01 Low  2015 - Present    Iron deficiency anemia D50.9 Medium  2015 - Present    Moderate to severe pulmonary hypertension (CMS-HCC) I27.2 High  2015 - Present    Coronary artery disease involving  native coronary artery of native heart without angina pectoris- non STEMI 2013; no stents or surgery; dr scruggs I25.10   10/6/2015 - Present    CVA, old, speech/language deficit- 2013 I69.328 Low  10/21/2015 - Present    Chronic pain syndrome-Dr. Capellan; tramadol rxd G89.4   4/20/2016 - Present    Urinary frequency R35.0   10/11/2016 - Present    Chronic systolic heart failure (CMS-[facility])- EF=50%, Nov., 2016 ECHO- dr scruggs I50.22 High  11/5/2016 - Present    CKD (chronic kidney disease) stage 3, GFR 30-59 ml/min N18.3 Low  11/5/2016 - Present    Mixed hyperlipidemia E78.2 Low  11/5/2016 - Present    Essential hypertension I10 Low  11/5/2016 - Present    Severe tricuspid regurgitation by prior echocardiogram I07.1 Low  11/5/2016 - Present    CAD (coronary artery disease) I25.10 Low  11/5/2016 - Present    RLS (restless legs syndrome) G25.81 Low  11/5/2016 - Present    Chronic UTI (urinary tract infection) N39.0   3/30/2017 - Present      Health Maintenance        Date Due Completion Dates    IMM DTaP/Tdap/Td Vaccine (1 - Tdap) 8/10/1947 ---    IMM ZOSTER VACCINE 8/10/1988 ---    MAMMOGRAM 3/19/2015 3/19/2014, 2/7/2013, 6/15/2012, 6/12/2012, 1/27/2011, 12/15/2009, 12/15/2009, 11/13/2008, 11/13/2008, 10/9/2007, 10/9/2007, 9/7/2006, 9/7/2006, 8/3/2005    BONE DENSITY 7/8/2019 7/8/2014, 4/12/2011            Current Immunizations     13-VALENT PCV PREVNAR 12/7/2015, 10/21/2015    Influenza TIV (IM) 10/28/2013, 10/1/2012    Pneumococcal polysaccharide vaccine (PPSV-23) 8/1/2008      Below and/or attached are the medications your provider expects you to take. Review all of your home medications and newly ordered medications with your provider and/or pharmacist. Follow medication instructions as directed by your provider and/or pharmacist. Please keep your medication list with you and share with your provider. Update the information when medications are discontinued, doses are changed, or new medications (including  over-the-counter products) are added; and carry medication information at all times in the event of emergency situations     Allergies:  PCN - Hives               Medications  Valid as of: June 16, 2017 - 11:05 AM    Generic Name Brand Name Tablet Size Instructions for use    Apixaban (Tab) ELIQUIS 2.5mg Take 1 Tab by mouth 2 Times a Day.        Betaxolol HCl (Suspension) BETOPTIC-S 0.25 % Place 1 Drop in both eyes every day.        Carvedilol (Tab) COREG 6.25 MG Take 1 Tab by mouth 2 times a day, with meals.        Cholecalciferol (Cap) Vitamin D 2000 UNITS Take 1 Cap by mouth every day.        Docusate Sodium (Cap) COLACE 100 MG Take 100 mg by mouth 2 times a day.        Furosemide (Tab) LASIX 20 MG Take 1 Tab by mouth 2 times a day.        Latanoprost (Solution) XALATAN 0.005 % Place 1 Drop in both eyes every evening.        Lisinopril (Tab) PRINIVIL 5 MG Take 1 Tab by mouth every day.        ROPINIRole HCl (Tab) REQUIP 1 MG Take 1 Tab by mouth 3 times a day.        Rosuvastatin Calcium (Tab) CRESTOR 20 MG TAKE 1 TABLET ORALLY DAILY EVERY EVENING        Spironolactone (Tab) ALDACTONE 50 MG Take 1 Tab by mouth every day.        Sulfamethoxazole-Trimethoprim (Tab) BACTRIM -160 MG Take 1 Tab by mouth every day. But initially Take one bid for 10 days, then switch to to a 1 daily for 3 months; REPLACES NITROFURANTOIN        TraMADol HCl (Tab) ULTRAM 50 MG Take  mg by mouth every four hours as needed. Indications: Moderate to Moderately Severe Pain        .                 Medicines prescribed today were sent to:     [facility][id number] - [place] - [address]    [address] [place] [address]    Phone: [phone] Fax: [phone]    Open 24 Hours?: No      Medication refill instructions:       If your prescription bottle indicates you have medication refills left, it is not necessary to call your provider’s office. Please contact your pharmacy and they will refill your medication.    If  your prescription bottle indicates you do not have any refills left, you may request refills at any time through one of the following ways: The online [facility] system (except Urgent Care), by calling your provider’s office, or by asking your pharmacy to contact your provider’s office with a refill request. Medication refills are processed only during regular business hours and may not be available until the next business day. Your provider may request additional information or to have a follow-up visit with you prior to refilling your medication.   *Please Note: Medication refills are assigned a new Rx number when refilled electronically. Your pharmacy may indicate that no refills were authorized even though a new prescription for the same medication is available at the pharmacy. Please request the medicine by name with the pharmacy before contacting your provider for a refill.           [facility] Access Code: Activation code not generated  Current [facility] Status: Active

## 2017-06-19 ENCOUNTER — TELEPHONE (OUTPATIENT)
Dept: CARDIOLOGY | Facility: MEDICAL CENTER | Age: 82
End: 2017-06-19

## 2017-06-19 DIAGNOSIS — R06.02 SOB (SHORTNESS OF BREATH): ICD-10-CM

## 2017-06-19 DIAGNOSIS — I48.91 ATRIAL FIBRILLATION, UNSPECIFIED TYPE (HCC): ICD-10-CM

## 2017-06-19 DIAGNOSIS — I50.23 ACUTE ON CHRONIC SYSTOLIC HEART FAILURE (HCC): ICD-10-CM

## 2017-06-19 DIAGNOSIS — I25.10 CORONARY ARTERY DISEASE DUE TO LIPID RICH PLAQUE: ICD-10-CM

## 2017-06-19 DIAGNOSIS — I25.83 CORONARY ARTERY DISEASE DUE TO LIPID RICH PLAQUE: ICD-10-CM

## 2017-06-19 DIAGNOSIS — N18.2 CKD (CHRONIC KIDNEY DISEASE), STAGE II: ICD-10-CM

## 2017-06-19 DIAGNOSIS — I10 ESSENTIAL HYPERTENSION: ICD-10-CM

## 2017-06-19 RX ORDER — CARVEDILOL 6.25 MG/1
1 TABLET ORAL 2 TIMES DAILY WITH MEALS
COMMUNITY
Start: 2017-06-19 | End: 2018-01-01 | Stop reason: SDUPTHER

## 2017-06-19 RX ORDER — OXYBUTYNIN CHLORIDE 5 MG/1
1 TABLET ORAL DAILY
COMMUNITY
Start: 2017-06-19 | End: 2018-01-01

## 2017-06-19 RX ORDER — FUROSEMIDE 20 MG/1
20 TABLET ORAL DAILY
COMMUNITY
Start: 2017-06-19 | End: 2018-01-01 | Stop reason: SDUPTHER

## 2017-06-19 NOTE — TELEPHONE ENCOUNTER
"Spoke with caregiver who updated patient's med list as well as gave me BP readings for the last 2 days as follows: 143/79 and 103/52 - did not have HR to go along with BP and has not yet checked BP today.  States patient has NOT been taking crestor routinely, in fact doesn't take it that often just \"here and there\" - explained to her the need for patient to take it daily and repeat labs in a couple of months.  She verbalized understanding and will discuss with patient to start taking every day but states patient sometimes gets angry when they tell her what to do.  Informed her that I would let Dr. Garza know the BP readings and if she wanted to make any changes I would be calling her back.   "

## 2017-06-19 NOTE — TELEPHONE ENCOUNTER
Will not change her current medication for now. Agree with better compliance with medications    Kirsten

## 2017-06-19 NOTE — TELEPHONE ENCOUNTER
----- Message from Kirsten Garza M.D. sent at 6/16/2017  4:53 PM PDT -----  Can you please call patient on Monday and get updated list of medications that she is currently taking. If she is not taking statins advised her to restart taking statins.  I also advised her to monitor blood pressure at home please give me updated blood pressure recordings based on that we'll titrate antihypertensive agents     Kirsten

## 2017-06-22 ENCOUNTER — OFFICE VISIT (OUTPATIENT)
Dept: PULMONOLOGY | Facility: HOSPICE | Age: 82
End: 2017-06-22
Payer: MEDICARE

## 2017-06-22 VITALS
DIASTOLIC BLOOD PRESSURE: 72 MMHG | HEART RATE: 61 BPM | RESPIRATION RATE: 16 BRPM | SYSTOLIC BLOOD PRESSURE: 132 MMHG | HEIGHT: 60 IN | BODY MASS INDEX: 21.99 KG/M2 | TEMPERATURE: 97.7 F | OXYGEN SATURATION: 96 % | WEIGHT: 112 LBS

## 2017-06-22 DIAGNOSIS — R93.89 ABNORMAL CAT SCAN: ICD-10-CM

## 2017-06-22 PROCEDURE — 99213 OFFICE O/P EST LOW 20 MIN: CPT | Performed by: NURSE PRACTITIONER

## 2017-06-22 RX ORDER — ASCORBIC ACID 500 MG
500 TABLET ORAL DAILY
Status: ON HOLD | COMMUNITY
End: 2018-01-01

## 2017-06-22 RX ORDER — DOXYCYCLINE 100 MG/1
CAPSULE ORAL
COMMUNITY
Start: 2017-06-08 | End: 2017-01-01

## 2017-06-22 NOTE — MR AVS SNAPSHOT
Barbara Murray   2017 10:40 AM   Office Visit   MRN: [id number]    Department:  [facility]   Dept Phone:  [phone]    Description:  Female : 8/10/1928   Provider:  RADHA Sumner           Reason for Visit     Follow-Up Last seen 16      Allergies as of 2017     Allergen Noted Reactions    Pcn [Penicillins] 2010   Hives      You were diagnosed with     Abnormal CAT scan   [[id number]]         Vital Signs     Blood Pressure Pulse Temperature Respirations Height Weight    132/72 mmHg 61 36.5 °C (97.7 °F) 16 1.524 m (5') 50.803 kg (112 lb)    Body Mass Index Oxygen Saturation Smoking Status             21.87 kg/m2 96% Former Smoker         Basic Information     Date Of Birth Sex Race Ethnicity Preferred Language    8/10/1928 Female White Non- English      Your appointments     2017 10:00 AM   Established Patient with Mahendra Morrow M.D.   [address] [id number] [phone]           You will be receiving a confirmation call a few days before your appointment from our automated call confirmation system.              Problem List              ICD-10-CM Priority Class Noted - Resolved    Esophageal stricture- s/p dilation dr foley K22.2   2013 - Present    MI (myocardial infarction) (CMS-[facility])- 2013: Non STEMI. dr scruggs I21.3 High  2013 - Present    Glaucoma H40.9 Low  10/7/2013 - Present    Chronic atrial fibrillation- rx Eliquis; dr scruggs I48.2 Medium  2014 - Present    Chronic anticoagulation- Eliquis (for A.Fib)- dr scruggs Z79.01 Low  2015 - Present    Iron deficiency anemia D50.9 Medium  2015 - Present    Moderate to severe pulmonary hypertension (CMS-HCC) I27.2 High  2015 - Present    Coronary artery disease involving native coronary artery of native heart without angina pectoris- non STEMI ; no stents or surgery; dr scruggs I25.10    10/6/2015 - Present    CVA, old, speech/language deficit- 2013 I69.328 Low  10/21/2015 - Present    Chronic pain syndrome-Dr. Capellan; tramadol rxd G89.4   4/20/2016 - Present    Urinary frequency R35.0   10/11/2016 - Present    Chronic systolic heart failure (CMS-[facility])- EF=50%, Nov., 2016 ECHO- dr scruggs I50.22 High  11/5/2016 - Present    CKD (chronic kidney disease) stage 3, GFR 30-59 ml/min N18.3 Low  11/5/2016 - Present    Mixed hyperlipidemia E78.2 Low  11/5/2016 - Present    Essential hypertension I10 Low  11/5/2016 - Present    Severe tricuspid regurgitation by prior echocardiogram I07.1 Low  11/5/2016 - Present    CAD (coronary artery disease) I25.10 Low  11/5/2016 - Present    RLS (restless legs syndrome) G25.81 Low  11/5/2016 - Present    Chronic UTI (urinary tract infection) N39.0   3/30/2017 - Present      Health Maintenance        Date Due Completion Dates    IMM DTaP/Tdap/Td Vaccine (1 - Tdap) 8/10/1947 ---    IMM ZOSTER VACCINE 8/10/1988 ---    MAMMOGRAM 3/19/2015 3/19/2014, 2/7/2013, 6/15/2012, 6/12/2012, 1/27/2011, 12/15/2009, 12/15/2009, 11/13/2008, 11/13/2008, 10/9/2007, 10/9/2007, 9/7/2006, 9/7/2006, 8/3/2005    BONE DENSITY 7/8/2019 7/8/2014, 4/12/2011            Current Immunizations     13-VALENT PCV PREVNAR 12/7/2015, 10/21/2015    Influenza TIV (IM) 10/28/2013, 10/1/2012    Pneumococcal polysaccharide vaccine (PPSV-23) 8/1/2008      Below and/or attached are the medications your provider expects you to take. Review all of your home medications and newly ordered medications with your provider and/or pharmacist. Follow medication instructions as directed by your provider and/or pharmacist. Please keep your medication list with you and share with your provider. Update the information when medications are discontinued, doses are changed, or new medications (including over-the-counter products) are added; and carry medication information at all times in the event of emergency situations      Allergies:  PCN - Hives               Medications  Valid as of: June 22, 2017 - 11:02 AM    Generic Name Brand Name Tablet Size Instructions for use    Apixaban (Tab) ELIQUIS 2.5mg Take 1 Tab by mouth 2 Times a Day.        Ascorbic Acid (Tab) ascorbic acid 500 MG Take 500 mg by mouth every day.        Betaxolol HCl (Suspension) BETOPTIC-S 0.25 % Place 1 Drop in both eyes every day.        Carvedilol (Tab) COREG 6.25 MG Take 1 Tab by mouth every day.        Cholecalciferol (Cap) Vitamin D 2000 UNITS Take 1 Cap by mouth every day.        Doxycycline Monohydrate (Cap) MONODOX 100 MG         Furosemide (Tab) LASIX 20 MG Take 1 Tab by mouth every day.        Iron Combinations (Tab) Iron  Take 65 mg by mouth every day at 6 PM.        Latanoprost (Solution) XALATAN 0.005 % Place 1 Drop in both eyes every evening.        Lisinopril (Tab) PRINIVIL 5 MG Take 1 Tab by mouth every day.        Oxybutynin Chloride (Tab) DITROPAN 5 MG Take 1 Tab by mouth every day.        ROPINIRole HCl (Tab) REQUIP 1 MG Take 1 Tab by mouth 3 times a day.        Rosuvastatin Calcium (Tab) CRESTOR 20 MG TAKE 1 TABLET ORALLY DAILY EVERY EVENING        TraMADol HCl (Tab) ULTRAM 50 MG Take 1-2 Tabs by mouth every four hours as needed. Indications: Moderate to Moderately Severe Pain        .                 Medicines prescribed today were sent to:     [facility][id number] - [place], [place] - [address]    [address] [place] [address]    Phone: [phone] Fax: [phone]    Open 24 Hours?: No      Medication refill instructions:       If your prescription bottle indicates you have medication refills left, it is not necessary to call your provider’s office. Please contact your pharmacy and they will refill your medication.    If your prescription bottle indicates you do not have any refills left, you may request refills at any time through one of the following ways: The online [employer] system (except Urgent Care), by calling your provider’s  office, or by asking your pharmacy to contact your provider’s office with a refill request. Medication refills are processed only during regular business hours and may not be available until the next business day. Your provider may request additional information or to have a follow-up visit with you prior to refilling your medication.   *Please Note: Medication refills are assigned a new Rx number when refilled electronically. Your pharmacy may indicate that no refills were authorized even though a new prescription for the same medication is available at the pharmacy. Please request the medicine by name with the pharmacy before contacting your provider for a refill.        Your To Do List     Future Labs/Procedures Complete By Expires    CT-CHEST (THORAX) W/O  As directed 6/22/2018      Instructions    1) Continue with follow up cat scan in 1 year. Due June 2018.   2) Vaccines: Up to date with Prevnar 13 and Pneumovax 23  3) Return in about 1 year (around 6/22/2018) for review of symptoms, if not sooner, follow up with ANDREW Robins, CAT scan results.              [email or web address] Access Code: Activation code not generated  Current [email or web address] Status: Active

## 2017-06-22 NOTE — PROGRESS NOTES
CC:  Here for f/u pulmonary issues as listed below    HPI:     Female who returns for abnormal chest CAT scan. She sustained a ground-level fall approximately 2 years ago and has had focal right sided rib pain since then. She had been referred to pain management and had a screening chest CAT scan performed for workup. Her chest CAT scan from 2015 showed Old granulomatous disease, Mosaic pattern of the lung parenchyma which may be related to bronchiolitis or hypersensitivity pneumonitis, Large retrocardiac hiatal hernia, Low attenuation in the head of the pancreas which may be dilated common bile duct, Extensive atherosclerotic vascular disease.S he had a follow-up chest CAT scan 2016 showing significant interval improvement in the mosaic scattered opacities. Obtain a CAT scan from 2017 shows no new or suspicious pulmonary nodules with multiple calcified granulomas, mild groundglass opacity throughout both lungs clearly related to pulmonary edema, biatrial dilation with severe coronary calcification, large hiatal hernia.    Patient is asymptomatic today. Denies night sweats, cough, shortness of breath, chest pain, hemoptysis, fever and chills. She had a history of pneumonia complicated by empyema at the age of 7 requiring left chest tube drainage. She otherwise had pneumonia in 2015 requiring hospitalization. Her father  of tuberculosis at the age of 48. She has a history of positive PPD skin test and denies chemoprophylaxis. She denies occupational or environmental exposures to particulate matter, chemicals or fumes. She has a history of CVA ×2, denies dysphagia or aspiration, but sometimes has difficulty swallowing her pills. Declined a swallow evaluation. She denies any significant gastroesophageal reflux symptoms.  She has minimal smoking history, estimated at 2 pack years, quit in 1958.   Pulmonary function testing showed normal DLCO at 102%, and FVC at 1.09 L or 56% suggesting  restrictive physiology however she was unable to perform lung volume measurements to confirm.          Patient Active Problem List    Diagnosis Date Noted   • Chronic systolic heart failure (CMS-[facility])- EF=50%, Nov., 2016 ECHO- dr scruggs 11/05/2016     Priority: High   • Moderate to severe pulmonary hypertension (CMS-[facility]) 05/14/2015     Priority: High   • MI (myocardial infarction) (CMS-HCC)- August 2013: Non STEMI. dr scruggs 08/14/2013     Priority: High   • Iron deficiency anemia 05/12/2015     Priority: Medium   • Chronic atrial fibrillation- rx Eliquis; dr scruggs 04/29/2014     Priority: Medium   • CKD (chronic kidney disease) stage 3, GFR 30-59 ml/min 11/05/2016     Priority: Low   • Mixed hyperlipidemia 11/05/2016     Priority: Low   • Essential hypertension 11/05/2016     Priority: Low   • Severe tricuspid regurgitation by prior echocardiogram 11/05/2016     Priority: Low   • CAD (coronary artery disease) 11/05/2016     Priority: Low   • RLS (restless legs syndrome) 11/05/2016     Priority: Low   • CVA, old, speech/language deficit- 2013 10/21/2015     Priority: Low   • Chronic anticoagulation- Eliquis (for A.Fib)- dr scruggs 04/30/2015     Priority: Low   • Glaucoma 10/07/2013     Priority: Low   • Abnormal CAT scan 06/22/2017   • Chronic UTI (urinary tract infection) 03/30/2017   • Urinary frequency 10/11/2016   • Chronic pain syndrome-Dr. Capellan; tramadol rxd 04/20/2016   • Coronary artery disease involving native coronary artery of native heart without angina pectoris- non STEMI 2013; no stents or surgery; dr scruggs 10/06/2015   • Esophageal stricture- s/p dilation dr foley 07/31/2013       Past Medical History   Diagnosis Date   • CAD (coronary artery disease) August 2013     Coronary angiogram with 40-50% stenosis of distal PDA, diagonal ostial 40-50%, RCA ostial 60-70%. LVEF 45-50%   • Other malaise and fatigue    • Reflux esophagitis    • Arrest of bone development or growth    • Symptomatic  menopausal or female climacteric states    • Disorder of bone and cartilage, unspecified    • Disorders of bursae and tendons in shoulder region, unspecified    • Degeneration of lumbar or lumbosacral intervertebral disc    • Other extrapyramidal disease and abnormal movement disorder    • Cellulitis and abscess of upper arm and forearm    • Rotator cuff (capsule) sprain    • Hypertonicity of bladder    • Osteoporosis, unspecified    • Anesthesia      During cataract procdure, not completely effective   • Unspecified hemorrhagic conditions      Nosebleeds   • Unspecified urinary incontinence    • Other specified disorder of intestines      Diarrhea   • Pneumonia    • Glaucoma    • Arrhythmia    • CATARACT      Status post removal   • Hypertension    • Abnormal EKG    • Atrial fibrillation (CMS-HCC) September 2014     Echocardiogram with LVEF 45-50%, mild MS, moderate MR, moderate TR. RVSP 50-55mmHg.   • Hyperlipidemia    • Arthritis    • MI (myocardial infarction) (CMS-HCC)     • CHF (congestive heart failure) (CMS-HCC) 5/11/2015   • Hiatus hernia syndrome    • Stroke (CMS-HCC) August 2013     speech deficit   • Heart burn    • Pain      low back,pain scale 7   • Backpain    • Pain      R leg, R hand   • High cholesterol    • Coronary heart disease    • Crohns disease (CMS-HCC)    • Rheumatoid arthritis (CMS-HCC)    • Restless leg syndrome    • Chronic UTI (urinary tract infection) 3/30/2017   • Severe tricuspid regurgitation by prior echocardiogram 11/5/2016       Past Surgical History   Procedure Laterality Date   • Tonsillectomy  1952   • Fannie by laparoscopy  1994   • Cataract phaco with iol  2007   • Rotator cuff repair Right 2008     dr bills   • Other       Epidural    • Other  1935     Tube insertion,left lung   • Other       Spinal meningitis   • Knee arthroplasty total  6/21/2010     Performed by INNA BILLS at [facility]   • Inguinal hernia repair bilateral  3/2/2011     Performed by  NIKOLE BELTRAN at SURGERY [facility]   • Gastroscopy with balloon dilatation  7/25/2013     Performed by Jed Hoyt Jr., M.D. at SURGERY [facility]   • Carpal tunnel release Right 10/20/2015     Procedure: CARPAL TUNNEL RELEASE OPEN;  Surgeon: Ar Macario M.D.;  Location: SURGERY [facility];  Service:    • Trigger finger release Right 10/20/2015     Procedure: TRIGGER FINGER RELEASE MIDDLE AND RING;  Surgeon: Ar Macario M.D.;  Location: SURGERY [facility];  Service:    • Cholecystectomy     • Pr remv 2nd cataract,corn-scler sectn     • Arthroscopy, knee         Family History   Problem Relation Age of Onset   • Heart Disease Mother    • Heart Failure Mother    • Lung Disease Father    • Stroke Sister        Social History   Substance Use Topics   • Smoking status: Former Smoker -- 2 years     Types: Cigarettes     Start date: 01/01/1956     Quit date: 11/19/1958   • Smokeless tobacco: Never Used      Comment: .5 ppd 9 yrs,quit 1966   • Alcohol Use: Yes      Comment: 1 per week       Current Outpatient Prescriptions   Medication Sig Dispense Refill   • Iron Tab Take 65 mg by mouth every day at 6 PM.     • doxycycline (MONODOX) 100 MG capsule      • ascorbic acid (ASCORBIC ACID) 500 MG Tab Take 500 mg by mouth every day.     • carvedilol (COREG) 6.25 MG Tab Take 1 Tab by mouth every day.     • furosemide (LASIX) 20 MG Tab Take 1 Tab by mouth every day.     • oxybutynin (DITROPAN) 5 MG Tab Take 1 Tab by mouth every day.     • apixaban (ELIQUIS) 2.5mg Tab Take 1 Tab by mouth 2 Times a Day. 180 Tab 3   • rosuvastatin (CRESTOR) 20 MG Tab TAKE 1 TABLET ORALLY DAILY EVERY EVENING 90 Tab 3   • lisinopril (PRINIVIL) 5 MG Tab Take 1 Tab by mouth every day. 90 Tab 4   • ropinirole (REQUIP) 1 MG Tab Take 1 Tab by mouth 3 times a day. 90 Tab 11   • tramadol (ULTRAM) 50 MG Tab Take 1-2 Tabs by mouth every four hours as needed. Indications: Moderate to Moderately Severe Pain     •  Cholecalciferol (VITAMIN D) 2000 UNITS Cap Take 1 Cap by mouth every day. 100 Cap 4   • latanoprost (XALATAN) 0.005 % SOLN Place 1 Drop in both eyes every evening.     • betaxolol (BETOPTIC-S) 0.25 % SUSP Place 1 Drop in both eyes every day.       No current facility-administered medications for this visit.          Allergies: Pcn          ROS   Gen: Denies fever, chills, unintentional weight loss, fatigue, night sweats  E/N/T: Denies nasal congestion, ear pain  Resp:Denies Dyspnea, wheezing, cough, sputum production, hemoptysis  CV: Denies chest pain, chest tightness, palpitations  Sleep:Denies morning headache  Neuro: Denies frequent headaches, weakness, dizziness  GI: Denies acid reflux, N/V  See HPI.  All other systems reviewed and negative          Vital signs for this encounter:  Filed Vitals:    06/22/17 1022   Height: 1.524 m (5')   Weight: 50.803 kg (112 lb)   Weight % change since last entry.: 0 %   BP: 132/72   Pulse: 61   BMI (Calculated): 21.87   Resp: 16   Temp: 36.5 °C (97.7 °F)   O2 sat % room air: 96 %                 Physical Exam:   Appearance: well developed, well nourished, no acute distress.   Eyes: PERRL, EOM intact, sclere white, conjunctiva moist.  Ears: no lesions or deformities.  Hearing: grossly intact.  Nose: no lesions or deformities.  Dentition: good dentition.   Oropharynx: tongue normal, posterior pharynx without erythema or exudate.  Neck: supple, trachea midline, no masses.  Respiratory effort: no intercostal retractions or use of accessory muscles.  Lung auscultation: Bilateral diminished   Heart auscultation: irregular with grade II murmur   Extremities: no cyanosis or edema.  Abdomen: soft, non-tender, no masses.  Gait and station: without difficulty   Digits and Nails: no clubbing, cyanosis, petechiae, or nodes.  Cranial nerves: grossly normal.  Motor: no focal deficits observed.   Skin: no rashes, lesions, or ulcers noted.  Orientation: oriented to time, place, and  person.  Mood and affect: mood and affect appropriate, normal interaction with examiner.      Assessment   1. Abnormal CAT scan  CT-CHEST (THORAX) W/O         PLAN:   Patient Instructions   1) Continue with follow up cat scan in 1 year. Due June 2018.   2) Vaccines: Up to date with Prevnar 13 and Pneumovax 23  3) Return in about 1 year (around 6/22/2018) for review of symptoms, if not sooner, follow up with ANDREW Robins, CAT scan results.

## 2017-06-22 NOTE — PATIENT INSTRUCTIONS
1) Continue with follow up cat scan in 1 year. Due June 2018.   2) Vaccines: Up to date with Prevnar 13 and Pneumovax 23  3) Return in about 1 year (around 6/22/2018) for review of symptoms, if not sooner, follow up with ANDREW Robins, CAT scan results.

## 2017-07-06 ENCOUNTER — OFFICE VISIT (OUTPATIENT)
Dept: MEDICAL GROUP | Age: 82
End: 2017-07-06
Payer: MEDICARE

## 2017-07-06 VITALS
WEIGHT: 112.6 LBS | HEIGHT: 60 IN | BODY MASS INDEX: 22.1 KG/M2 | OXYGEN SATURATION: 96 % | SYSTOLIC BLOOD PRESSURE: 118 MMHG | TEMPERATURE: 98.2 F | HEART RATE: 80 BPM | DIASTOLIC BLOOD PRESSURE: 86 MMHG

## 2017-07-06 DIAGNOSIS — E78.2 MIXED HYPERLIPIDEMIA: ICD-10-CM

## 2017-07-06 DIAGNOSIS — D50.9 IRON DEFICIENCY ANEMIA, UNSPECIFIED IRON DEFICIENCY ANEMIA TYPE: ICD-10-CM

## 2017-07-06 DIAGNOSIS — I69.328 CVA, OLD, SPEECH/LANGUAGE DEFICIT: ICD-10-CM

## 2017-07-06 DIAGNOSIS — I10 ESSENTIAL HYPERTENSION: ICD-10-CM

## 2017-07-06 DIAGNOSIS — Z79.01 CHRONIC ANTICOAGULATION: ICD-10-CM

## 2017-07-06 DIAGNOSIS — N18.30 CKD (CHRONIC KIDNEY DISEASE) STAGE 3, GFR 30-59 ML/MIN (HCC): ICD-10-CM

## 2017-07-06 DIAGNOSIS — K22.2 ESOPHAGEAL STRICTURE: ICD-10-CM

## 2017-07-06 DIAGNOSIS — I50.22 CHRONIC SYSTOLIC HEART FAILURE (HCC): ICD-10-CM

## 2017-07-06 DIAGNOSIS — I48.20 CHRONIC ATRIAL FIBRILLATION (HCC): ICD-10-CM

## 2017-07-06 PROCEDURE — 99214 OFFICE O/P EST MOD 30 MIN: CPT | Performed by: INTERNAL MEDICINE

## 2017-07-06 ASSESSMENT — ENCOUNTER SYMPTOMS
GASTROINTESTINAL NEGATIVE: 1
CONSTITUTIONAL NEGATIVE: 1
PSYCHIATRIC NEGATIVE: 1
EYES NEGATIVE: 1
NEUROLOGICAL NEGATIVE: 1
MUSCULOSKELETAL NEGATIVE: 1
CARDIOVASCULAR NEGATIVE: 1
RESPIRATORY NEGATIVE: 1

## 2017-07-06 NOTE — MR AVS SNAPSHOT
Barbara Zaragoza Trevor   2017 10:00 AM   Office Visit   MRN: [id number]    Department:  [address] [place] [address]   Dept Phone:  [phone]    Description:  Female : 8/10/1928   Provider:  Mahendra Morrow M.D.           Reason for Visit     Follow-Up labs not done      Allergies as of 2017     Allergen Noted Reactions    Pcn [Penicillins] 2010   Hives      You were diagnosed with     Iron deficiency anemia, unspecified iron deficiency anemia type   [3875418]       Mixed hyperlipidemia   [272.2.ICD-9-CM]       Essential hypertension   [2264615]       Chronic systolic heart failure (CMS-Formerly [facility])   [428.22.ICD-9-CM]       Chronic atrial fibrillation (CMS-Formerly [facility])   [140843]       Chronic anticoagulation   [113304]       CKD (chronic kidney disease) stage 3, GFR 30-59 ml/min   [335831]       CVA, old, speech/language deficit   [640092]       Esophageal stricture   [961567]         Vital Signs     Blood Pressure Pulse Temperature Height Weight Body Mass Index    118/86 mmHg 80 36.8 °C (98.2 °F) 1.524 m (5') 51.075 kg (112 lb 9.6 oz) 21.99 kg/m2    Oxygen Saturation Smoking Status                96% Former Smoker          Basic Information     Date Of Birth Sex Race Ethnicity Preferred Language    8/10/1928 Female White Non- English      Your appointments     Oct 10, 2017 10:00 AM   Established Patient with Mahendra Morrow M.D.   [address] [address] [phone]           You will be receiving a confirmation call a few days before your appointment from our automated call confirmation system.              Problem List              ICD-10-CM Priority Class Noted - Resolved    Esophageal stricture- s/p dilation dr foley K22.2   2013 - Present    MI (myocardial infarction) (CMS-Formerly [facility])- 2013: Non STEMI. dr scruggs I21.3 High  2013 - Present    Glaucoma H40.9 Low  10/7/2013 - Present    Chronic atrial fibrillation- rx Eliquis;   sheba I48.2 Medium  4/29/2014 - Present    Chronic anticoagulation- Eliquis (for A.Fib)- dr scruggs Z79.01 Low  4/30/2015 - Present    Iron deficiency anemia D50.9 Medium  5/12/2015 - Present    Moderate to severe pulmonary hypertension (CMS-HCC) I27.2 High  5/14/2015 - Present    Coronary artery disease involving native coronary artery of native heart without angina pectoris- non STEMI 2013; no stents or surgery; dr scruggs I25.10   10/6/2015 - Present    CVA, old, speech/language deficit- 2013 I69.328 Low  10/21/2015 - Present    Chronic pain syndrome-Dr. Capellan; tramadol rxd G89.4   4/20/2016 - Present    Urinary frequency R35.0   10/11/2016 - Present    Chronic systolic heart failure (CMS-HCC)- EF=50%, Nov., 2016 ECHO- dr scruggs I50.22 High  11/5/2016 - Present    CKD (chronic kidney disease) stage 3, GFR 30-59 ml/min N18.3 Low  11/5/2016 - Present    Mixed hyperlipidemia E78.2 Low  11/5/2016 - Present    Essential hypertension I10 Low  11/5/2016 - Present    Severe tricuspid regurgitation by prior echocardiogram I07.1 Low  11/5/2016 - Present    CAD (coronary artery disease) I25.10 Low  11/5/2016 - Present    RLS (restless legs syndrome) G25.81 Low  11/5/2016 - Present    Chronic UTI (urinary tract infection) N39.0   3/30/2017 - Present    Abnormal CT scan, chest- [facility] dr hoover R93.8   6/22/2017 - Present      Health Maintenance        Date Due Completion Dates    IMM DTaP/Tdap/Td Vaccine (1 - Tdap) 8/10/1947 ---    IMM ZOSTER VACCINE 8/10/1988 ---    MAMMOGRAM 3/19/2015 3/19/2014, 2/7/2013, 6/15/2012, 6/12/2012, 1/27/2011, 12/15/2009, 12/15/2009, 11/13/2008, 11/13/2008, 10/9/2007, 10/9/2007, 9/7/2006, 9/7/2006, 8/3/2005    IMM INFLUENZA (1) 9/1/2017 10/28/2013, 10/1/2012    BONE DENSITY 7/8/2019 7/8/2014, 4/12/2011            Current Immunizations     13-VALENT PCV PREVNAR 12/7/2015, 10/21/2015    Influenza TIV (IM) 10/28/2013, 10/1/2012    Pneumococcal polysaccharide vaccine (PPSV-23) 8/1/2008      Below  and/or attached are the medications your provider expects you to take. Review all of your home medications and newly ordered medications with your provider and/or pharmacist. Follow medication instructions as directed by your provider and/or pharmacist. Please keep your medication list with you and share with your provider. Update the information when medications are discontinued, doses are changed, or new medications (including over-the-counter products) are added; and carry medication information at all times in the event of emergency situations     Allergies:  PCN - Hives               Medications  Valid as of: July 06, 2017 - 10:24 AM    Generic Name Brand Name Tablet Size Instructions for use    Apixaban (Tab) ELIQUIS 2.5mg Take 1 Tab by mouth 2 Times a Day.        Ascorbic Acid (Tab) ascorbic acid 500 MG Take 500 mg by mouth every day.        Betaxolol HCl (Suspension) BETOPTIC-S 0.25 % Place 1 Drop in both eyes every day.        Carvedilol (Tab) COREG 6.25 MG Take 1 Tab by mouth every day.        Cholecalciferol (Cap) Vitamin D 2000 UNITS Take 1 Cap by mouth every day.        Doxycycline Monohydrate (Cap) MONODOX 100 MG         Furosemide (Tab) LASIX 20 MG Take 1 Tab by mouth every day.        Iron Combinations (Tab) Iron  Take 65 mg by mouth every day at 6 PM.        Latanoprost (Solution) XALATAN 0.005 % Place 1 Drop in both eyes every evening.        Lisinopril (Tab) PRINIVIL 5 MG Take 1 Tab by mouth every day.        Oxybutynin Chloride (Tab) DITROPAN 5 MG Take 1 Tab by mouth every day.        ROPINIRole HCl (Tab) REQUIP 1 MG Take 1 Tab by mouth 3 times a day.        Rosuvastatin Calcium (Tab) CRESTOR 20 MG TAKE 1 TABLET ORALLY DAILY EVERY EVENING        TraMADol HCl (Tab) ULTRAM 50 MG Take 1-2 Tabs by mouth every four hours as needed. Indications: Moderate to Moderately Severe Pain        .                 Medicines prescribed today were sent to:     [id number] - [place], [place] - [address]       [address] [address]    Phone: [phone] Fax: [phone]    Open 24 Hours?: No      Medication refill instructions:       If your prescription bottle indicates you have medication refills left, it is not necessary to call your provider’s office. Please contact your pharmacy and they will refill your medication.    If your prescription bottle indicates you do not have any refills left, you may request refills at any time through one of the following ways: The online [employer] system (except Urgent Care), by calling your provider’s office, or by asking your pharmacy to contact your provider’s office with a refill request. Medication refills are processed only during regular business hours and may not be available until the next business day. Your provider may request additional information or to have a follow-up visit with you prior to refilling your medication.   *Please Note: Medication refills are assigned a new Rx number when refilled electronically. Your pharmacy may indicate that no refills were authorized even though a new prescription for the same medication is available at the pharmacy. Please request the medicine by name with the pharmacy before contacting your provider for a refill.        Your To Do List     Future Labs/Procedures Complete By Expires    CBC WITH DIFFERENTIAL  As directed 7/6/2018    COMP METABOLIC PANEL  As directed 7/6/2018    FERRITIN  As directed 7/6/2018    IRON/TOTAL IRON BIND  As directed 7/6/2018    LIPID PROFILE  As directed 7/6/2018    TSH  As directed 7/6/2018         As[employer] Access Code: Activation code not generated  Current [employer] Status: Active

## 2017-07-06 NOTE — PROGRESS NOTES
Subjective:      Barbara Murray is a 88 y.o. female who presents with Follow-Up  The patient is here for followup of chronic medical problems listed below. The patient is compliant with medications and having no side effects from them. Denies chest pain, abdominal pain, dyspnea, myalgias, or cough.   Patient Active Problem List    Diagnosis Date Noted   • Chronic systolic heart failure (CMS-HCC)- EF=50%, Nov., 2016 ECHO- dr scruggs 11/05/2016     Priority: High   • Moderate to severe pulmonary hypertension (CMS-HCC) 05/14/2015     Priority: High   • MI (myocardial infarction) (CMS-HCC)- August 2013: Non STEMI. dr scruggs 08/14/2013     Priority: High   • Iron deficiency anemia 05/12/2015     Priority: Medium   • Chronic atrial fibrillation- rx Eliquis; dr scruggs 04/29/2014     Priority: Medium   • CKD (chronic kidney disease) stage 3, GFR 30-59 ml/min 11/05/2016     Priority: Low   • Mixed hyperlipidemia 11/05/2016     Priority: Low   • Essential hypertension 11/05/2016     Priority: Low   • Severe tricuspid regurgitation by prior echocardiogram 11/05/2016     Priority: Low   • CAD (coronary artery disease) 11/05/2016     Priority: Low   • RLS (restless legs syndrome) 11/05/2016     Priority: Low   • CVA, old, speech/language deficit- 2013 10/21/2015     Priority: Low   • Chronic anticoagulation- Eliquis (for A.Fib)- dr scruggs 04/30/2015     Priority: Low   • Glaucoma 10/07/2013     Priority: Low   • Abnormal CT scan, chest- [facility] dr hoover 06/22/2017   • Chronic UTI (urinary tract infection) 03/30/2017   • Urinary frequency 10/11/2016   • Chronic pain syndrome-Dr. Capellan; tramadol rxd 04/20/2016   • Coronary artery disease involving native coronary artery of native heart without angina pectoris- non STEMI 2013; no stents or surgery; dr scruggs 10/06/2015   • Esophageal stricture- s/p dilation dr foley 07/31/2013     Allergies   Allergen Reactions   • Pcn [Penicillins] Hives     Outpatient Prescriptions Prior to Visit    Medication Sig Dispense Refill   • doxycycline (MONODOX) 100 MG capsule      • ascorbic acid (ASCORBIC ACID) 500 MG Tab Take 500 mg by mouth every day.     • carvedilol (COREG) 6.25 MG Tab Take 1 Tab by mouth every day.     • furosemide (LASIX) 20 MG Tab Take 1 Tab by mouth every day.     • oxybutynin (DITROPAN) 5 MG Tab Take 1 Tab by mouth every day.     • apixaban (ELIQUIS) 2.5mg Tab Take 1 Tab by mouth 2 Times a Day. 180 Tab 3   • rosuvastatin (CRESTOR) 20 MG Tab TAKE 1 TABLET ORALLY DAILY EVERY EVENING 90 Tab 3   • lisinopril (PRINIVIL) 5 MG Tab Take 1 Tab by mouth every day. 90 Tab 4   • ropinirole (REQUIP) 1 MG Tab Take 1 Tab by mouth 3 times a day. 90 Tab 11   • tramadol (ULTRAM) 50 MG Tab Take 1-2 Tabs by mouth every four hours as needed. Indications: Moderate to Moderately Severe Pain     • Cholecalciferol (VITAMIN D) 2000 UNITS Cap Take 1 Cap by mouth every day. 100 Cap 4   • latanoprost (XALATAN) 0.005 % SOLN Place 1 Drop in both eyes every evening.     • Iron Tab Take 65 mg by mouth every day at 6 PM.     • betaxolol (BETOPTIC-S) 0.25 % SUSP Place 1 Drop in both eyes every day.       No facility-administered medications prior to visit.               HPI    Review of Systems   Constitutional: Negative.    HENT: Negative.    Eyes: Negative.    Respiratory: Negative.    Cardiovascular: Negative.    Gastrointestinal: Negative.    Genitourinary: Negative.    Musculoskeletal: Negative.    Skin: Negative.    Neurological: Negative.    Endo/Heme/Allergies: Negative.    Psychiatric/Behavioral: Negative.           Objective:     /86 mmHg  Pulse 80  Temp(Src) 36.8 °C (98.2 °F)  Ht 1.524 m (5')  Wt 51.075 kg (112 lb 9.6 oz)  BMI 21.99 kg/m2  SpO2 96%     Physical Exam   Constitutional: She is oriented to person, place, and time. She appears well-developed and well-nourished.   HENT:   Head: Normocephalic and atraumatic.   Right Ear: External ear normal.   Left Ear: External ear normal.   Nose: Nose  normal.   Mouth/Throat: Oropharynx is clear and moist.   Eyes: Conjunctivae and EOM are normal. Pupils are equal, round, and reactive to light. Right eye exhibits no discharge. Left eye exhibits no discharge. No scleral icterus.   Neck: Normal range of motion. Neck supple. No JVD present. No tracheal deviation present. No thyromegaly present.   Cardiovascular: Normal rate, regular rhythm, normal heart sounds and intact distal pulses.  Exam reveals no gallop and no friction rub.    Pulmonary/Chest: Effort normal and breath sounds normal. No stridor. No respiratory distress. She has no wheezes. She has no rales. She exhibits no tenderness.   Abdominal: Soft. Bowel sounds are normal. She exhibits no distension and no mass. There is no tenderness. There is no rebound and no guarding. No hernia.   Musculoskeletal: Normal range of motion. She exhibits no edema or tenderness.   Lymphadenopathy:     She has no cervical adenopathy.   Neurological: She is alert and oriented to person, place, and time. She has normal reflexes. She displays normal reflexes. No cranial nerve deficit. Coordination normal.   Skin: Skin is warm and dry. No rash noted. No erythema. No pallor.   Psychiatric: She has a normal mood and affect. Her behavior is normal. Judgment and thought content normal.   Nursing note and vitals reviewed.    Hospital Outpatient Visit on 06/08/2017   Component Date Value   • Cholesterol,Tot 06/08/2017 201*   • Triglycerides 06/08/2017 80    • HDL 06/08/2017 66    • LDL 06/08/2017 119*      Lab Results   Component Value Date/Time    GLYCOHEMOGLOBIN 6.1* 10/10/2013 05:59 AM    GLYCOHEMOGLOBIN 4.9 04/21/2012 09:36 AM     Lab Results   Component Value Date/Time    SODIUM 138 02/08/2017 09:13 AM    POTASSIUM 4.2 02/08/2017 09:13 AM    CHLORIDE 104 02/08/2017 09:13 AM    CO2 24 02/08/2017 09:13 AM    GLUCOSE 86 02/08/2017 09:13 AM    BUN 28* 02/08/2017 09:13 AM    CREATININE 0.96 02/08/2017 09:13 AM    BUN-CREATININE RATIO 23  07/10/2015 01:18 PM    GLOM FILT RATE, EST 53* 05/03/2010 12:03 PM    ALKALINE PHOSPHATASE 48 02/08/2017 09:13 AM    AST(SGOT) 26 02/08/2017 09:13 AM    ALT(SGPT) 18 02/08/2017 09:13 AM    TOTAL BILIRUBIN 1.0 02/08/2017 09:13 AM     Lab Results   Component Value Date/Time    INR 1.23* 11/04/2016 09:50 AM    INR 1.34* 05/11/2015 07:30 PM    INR 2.44* 09/05/2014 02:42 PM     Lab Results   Component Value Date/Time    CHOLESTEROL,* 06/08/2017 09:46 AM    * 06/08/2017 09:46 AM    HDL 66 06/08/2017 09:46 AM    TRIGLYCERIDES 80 06/08/2017 09:46 AM       No results found for: TESTOSTERONE  Lab Results   Component Value Date/Time    TSH 2.410 05/03/2010 12:03 PM     Lab Results   Component Value Date/Time    FREE T-4 0.91 06/17/2016 09:08 AM    FREE T-4 0.89 12/10/2015 09:23 AM     No results found for: URICACID  No components found for: VITB12  Lab Results   Component Value Date/Time    25-HYDROXY   VITAMIN D 25 31 11/04/2016 09:50 AM    25-HYDROXY   VITAMIN D 25 19* 06/17/2016 09:08 AM                  Assessment/Plan:     1. Iron deficiency anemia, unspecified iron deficiency anemia type -this is follow-up. Encouraged her to resume taking one iron pill a day with food. Probably secondary to previous bleeding episode, secondary to ankle a.m. therapy.. Anemia persists without deficiency will need stool occult blood testing.     - CBC WITH DIFFERENTIAL; Future  - IRON/TOTAL IRON BIND; Future  - FERRITIN; Future    2. Mixed hyperlipidemiaUnder good control. Continue same regimen.     - TSH; Future  - COMP METABOLIC PANEL; Future  - LIPID PROFILE; Future  - CBC WITH DIFFERENTIAL; Future    3. Essential hypertensionUnder good control. Continue same regimen.      4. Chronic systolic heart failure (CMS-HCC)- EF=50%, Nov., 2016 ECHO- dr Bridges good control. Continue same regimen.     5. Chronic atrial fibrillation- rx Eliquis; dr Bridges good control. Continue same regimen.       6. Chronic  anticoagulation- Eliquis (for A.Fib)- dr Bridges good control. Continue same regimen.        7. CKD (chronic kidney disease) stage 3, GFR 30-59 ml/minUnder good control. Continue same regimen.     8. CVA, old, speech/language deficit- 2013[place] good control. Continue same regimen.       9. Esophageal stricture- s/p dilation dr Lopez good control. Continue same regimen.            30 minute face-to-face encounter took place today.  More than half of this time was spent in the coordination of care of the above problems, as well as counseling.

## 2017-08-03 ENCOUNTER — PATIENT OUTREACH (OUTPATIENT)
Dept: HEALTH INFORMATION MANAGEMENT | Facility: OTHER | Age: 82
End: 2017-08-03

## 2017-08-03 ENCOUNTER — TELEPHONE (OUTPATIENT)
Dept: HEALTH INFORMATION MANAGEMENT | Facility: OTHER | Age: 82
End: 2017-08-03

## 2017-08-03 DIAGNOSIS — Z12.31 ENCOUNTER FOR SCREENING MAMMOGRAM FOR BREAST CANCER: ICD-10-CM

## 2017-08-03 NOTE — PROGRESS NOTES
Attempt #:2    WebIZ Checked & Epic Updated: yes  HealthConnect Verified: no  Verify PCP: yes    Communication Preference Obtained: yes     Review Care Team: yes    Annual Wellness Visit Scheduling  1. Scheduling Status:Scheduled          Care Gap Scheduling (Attempt to Schedule EACH Overdue Care Gap!)     Health Maintenance Due   Topic Date Due   • Annual Wellness Visit  Scheduled   • IMM DTaP/Tdap/Td Vaccine (1 - Tdap) Informed   • IMM ZOSTER VACCINE  Informed   • MAMMOGRAM  Overage- ENC sent to PCP         MyChart Activation: already active  [employer] [place]: no  Virtual Visits: no  Opt In to Text Messages: no

## 2017-08-03 NOTE — TELEPHONE ENCOUNTER
Patient is over the recommended age and is showing overdue for Mammogram in Health Maintenance.    Please reply to this message as to whether these tests are appropriate and I will update the Health Maintenance Topic or contact the patient to schedule.

## 2017-08-09 ENCOUNTER — HOSPITAL ENCOUNTER (EMERGENCY)
Facility: MEDICAL CENTER | Age: 82
End: 2017-08-09
Attending: EMERGENCY MEDICINE
Payer: MEDICARE

## 2017-08-09 VITALS
DIASTOLIC BLOOD PRESSURE: 81 MMHG | TEMPERATURE: 98.5 F | RESPIRATION RATE: 20 BRPM | HEIGHT: 61 IN | WEIGHT: 110 LBS | HEART RATE: 105 BPM | BODY MASS INDEX: 20.77 KG/M2 | OXYGEN SATURATION: 96 % | SYSTOLIC BLOOD PRESSURE: 142 MMHG

## 2017-08-09 DIAGNOSIS — S41.111A LACERATION OF ARM, RIGHT, MULTIPLE SITES, INITIAL ENCOUNTER: ICD-10-CM

## 2017-08-09 PROCEDURE — 305308 HCHG STAPLER,SKIN,DISP.

## 2017-08-09 PROCEDURE — 304999 HCHG REPAIR-SIMPLE/INTERMED LEVEL 1

## 2017-08-09 PROCEDURE — 700111 HCHG RX REV CODE 636 W/ 250 OVERRIDE (IP): Performed by: EMERGENCY MEDICINE

## 2017-08-09 PROCEDURE — 304217 HCHG IRRIGATION SYSTEM

## 2017-08-09 PROCEDURE — 99284 EMERGENCY DEPT VISIT MOD MDM: CPT

## 2017-08-09 PROCEDURE — 90471 IMMUNIZATION ADMIN: CPT

## 2017-08-09 PROCEDURE — 90715 TDAP VACCINE 7 YRS/> IM: CPT | Performed by: EMERGENCY MEDICINE

## 2017-08-09 RX ADMIN — CLOSTRIDIUM TETANI TOXOID ANTIGEN (FORMALDEHYDE INACTIVATED), CORYNEBACTERIUM DIPHTHERIAE TOXOID ANTIGEN (FORMALDEHYDE INACTIVATED), BORDETELLA PERTUSSIS TOXOID ANTIGEN (GLUTARALDEHYDE INACTIVATED), BORDETELLA PERTUSSIS FILAMENTOUS HEMAGGLUTININ ANTIGEN (FORMALDEHYDE INACTIVATED), BORDETELLA PERTUSSIS PERTACTIN ANTIGEN, AND BORDETELLA PERTUSSIS FIMBRIAE 2/3 ANTIGEN 0.5 ML: 5; 2; 2.5; 5; 3; 5 INJECTION, SUSPENSION INTRAMUSCULAR at 18:32

## 2017-08-09 ASSESSMENT — PAIN SCALES - GENERAL: PAINLEVEL_OUTOF10: 4

## 2017-08-09 NOTE — ED AVS SNAPSHOT
8/9/2017    Barbara Murray  [address] [place] [address]    Dear Barbara:    [facility] wants to ensure your discharge home is safe and you or your loved ones have had all of your questions answered regarding your care after you leave the hospital.    Below is a list of resources and contact information should you have any questions regarding your hospital stay, follow-up instructions, or active medical symptoms.    Questions or Concerns Regarding… Contact   Medical Questions Related to Your Discharge  (7 days a week, 8am-5pm) Contact a Nurse Care Coordinator   [phone]   Medical Questions Not Related to Your Discharge  (24 hours a day / 7 days a week)  Contact the Nurse Health Line   [phone]    Medications or Discharge Instructions Refer to your discharge packet   or contact your [facility] Primary Care Provider   [phone]   Follow-up Appointment(s) Schedule your appointment via Portable Internet   or contact Scheduling [phone]   Billing Review your statement via Portable Internet  or contact Billing [phone]   Medical Records Review your records via Portable Internet   or contact Medical Records [phone]     You may receive a telephone call within two days of discharge. This call is to make certain you understand your discharge instructions and have the opportunity to have any questions answered. You can also easily access your medical information, test results and upcoming appointments via the Portable Internet free online health management tool. You can learn more and sign up at [employer]Portable Internet. For assistance setting up your Portable Internet account, please call [phone].    Once again, we want to ensure your discharge home is safe and that you have a clear understanding of any next steps in your care. If you have any questions or concerns, please do not hesitate to contact us, we are here for you. Thank you for choosing [facility] for your healthcare needs.    Sincerely,    Your [facility] Healthcare Team

## 2017-08-09 NOTE — ED AVS SNAPSHOT
Home Care Instructions                                                                                                                Barbara Murray   MRN: [id number]    Department:  [facility], Emergency Dept   Date of Visit:  8/9/2017            [facility], Emergency Dept    [address]    [place] [address]    Phone:  [phone]      You were seen by     Tammy Crane M.D.      Your Diagnosis Was     Laceration of arm, right, multiple sites, initial encounter     S41.111A       These are the medications you received during your hospitalization from 08/09/2017 1803 to 08/09/2017 1901     Date/Time Order Dose Route Action    08/09/2017 1832 tetanus-dipth-acell pertussis (ADACEL) inj 0.5 mL 0.5 mL Intramuscular Given      Follow-up Information     1. Follow up with Mahendra Morrow M.D. In 8 days.    Specialty:  Internal Medicine    Why:  For suture removal    Contact information    [address] [place] [address] [phone]        Medication Information     Review all of your home medications and newly ordered medications with your primary doctor and/or pharmacist as soon as possible. Follow medication instructions as directed by your doctor and/or pharmacist.     Please keep your complete medication list with you and share with your physician. Update the information when medications are discontinued, doses are changed, or new medications (including over-the-counter products) are added; and carry medication information at all times in the event of emergency situations.               Medication List      ASK your doctor about these medications        Instructions    Morning Afternoon Evening Bedtime    apixaban 2.5mg Tabs   Commonly known as:  ELIQUIS        Take 1 Tab by mouth 2 Times a Day.   Dose:  2.5 mg                        ascorbic acid 500 MG Tabs   Commonly known as:  ascorbic acid        Take 500 mg by mouth every day.   Dose:  500 mg                        BETOPTIC-S 0.25 % Susp   Generic drug:  betaxolol        Place 1 Drop in both eyes every day.   Dose:  1 Drop                        carvedilol 6.25 MG Tabs   Commonly known as:  COREG        Take 1 Tab by mouth every day.   Dose:  6.25 mg                        doxycycline 100 MG capsule   Commonly known as:  MONODOX                             furosemide 20 MG Tabs   Commonly known as:  LASIX        Take 1 Tab by mouth every day.   Dose:  20 mg                        Iron Tabs        Take 65 mg by mouth every day at 6 PM.   Dose:  65 mg                        lisinopril 5 MG Tabs   Commonly known as:  PRINIVIL        Take 1 Tab by mouth every day.   Dose:  5 mg                        oxybutynin 5 MG Tabs   Commonly known as:  DITROPAN        Take 1 Tab by mouth every day.   Dose:  5 mg                        ropinirole 1 MG Tabs   Commonly known as:  REQUIP        Take 1 Tab by mouth 3 times a day.   Dose:  1 mg                        rosuvastatin 20 MG Tabs   Commonly known as:  CRESTOR        TAKE 1 TABLET ORALLY DAILY EVERY EVENING                        tramadol 50 MG Tabs   Commonly known as:  ULTRAM        Take 1-2 Tabs by mouth every four hours as needed. Indications: Moderate to Moderately Severe Pain   Dose:  1-2 Tab                        Vitamin D 2000 UNITS Caps        Take 1 Cap by mouth every day.   Dose:  2000 Units                        XALATAN 0.005 % Soln   Generic drug:  latanoprost        Place 1 Drop in both eyes every evening.   Dose:  1 Drop                                  Discharge Instructions       Laceration Care, Adult  A laceration is a cut that goes through all layers of the skin. The cut also goes into the tissue that is right under the skin. Some cuts heal on their own. Others need to be closed with stitches (sutures), staples, skin adhesive strips, or wound glue. Taking care of your cut lowers your risk of infection and helps your cut to heal better.  HOW TO  TAKE CARE OF YOUR CUT  For stitches or staples:  · Keep the wound clean and dry.  · If you were given a bandage (dressing), you should change it at least one time per day or as told by your doctor. You should also change it if it gets wet or dirty.  · Keep the wound completely dry for the first 24 hours or as told by your doctor. After that time, you may take a shower or a bath. However, make sure that the wound is not soaked in water until after the stitches or staples have been removed.  · Clean the wound one time each day or as told by your doctor:  ¨ Wash the wound with soap and water.  ¨ Rinse the wound with water until all of the soap comes off.  ¨ Pat the wound dry with a clean towel. Do not rub the wound.  · After you clean the wound, put a thin layer of antibiotic ointment on it as told by your doctor. This ointment:  ¨ Helps to prevent infection.  ¨ Keeps the bandage from sticking to the wound.  · Have your stitches or staples removed as told by your doctor.  If your doctor used skin adhesive strips:   · Keep the wound clean and dry.  · If you were given a bandage, you should change it at least one time per day or as told by your doctor. You should also change it if it gets dirty or wet.  · Do not get the skin adhesive strips wet. You can take a shower or a bath, but be careful to keep the wound dry.  · If the wound gets wet, pat it dry with a clean towel. Do not rub the wound.  · Skin adhesive strips fall off on their own. You can trim the strips as the wound heals. Do not remove any strips that are still stuck to the wound. They will fall off after a while.  If your doctor used wound glue:  · Try to keep your wound dry, but you may briefly wet it in the shower or bath. Do not soak the wound in water, such as by swimming.  · After you take a shower or a bath, gently pat the wound dry with a clean towel. Do not rub the wound.  · Do not do any activities that will make you really sweaty until the skin glue  has fallen off on its own.  · Do not apply liquid, cream, or ointment medicine to your wound while the skin glue is still on.  · If you were given a bandage, you should change it at least one time per day or as told by your doctor. You should also change it if it gets dirty or wet.  · If a bandage is placed over the wound, do not let the tape for the bandage touch the skin glue.  · Do not pick at the glue. The skin glue usually stays on for 5-10 days. Then, it falls off of the skin.  General Instructions   · To help prevent scarring, make sure to cover your wound with sunscreen whenever you are outside after stitches are removed, after adhesive strips are removed, or when wound glue stays in place and the wound is healed. Make sure to wear a sunscreen of at least 30 SPF.  · Take over-the-counter and prescription medicines only as told by your doctor.  · If you were given antibiotic medicine or ointment, take or apply it as told by your doctor. Do not stop using the antibiotic even if your wound is getting better.  · Do not scratch or pick at the wound.  · Keep all follow-up visits as told by your doctor. This is important.  · Check your wound every day for signs of infection. Watch for:  ¨ Redness, swelling, or pain.  ¨ Fluid, blood, or pus.  · Raise (elevate) the injured area above the level of your heart while you are sitting or lying down, if possible.  GET HELP IF:  · You got a tetanus shot and you have any of these problems at the injection site:  ¨ Swelling.  ¨ Very bad pain.  ¨ Redness.  ¨ Bleeding.  · You have a fever.  · A wound that was closed breaks open.  · You notice a bad smell coming from your wound or your bandage.  · You notice something coming out of the wound, such as wood or glass.  · Medicine does not help your pain.  · You have more redness, swelling, or pain at the site of your wound.  · You have fluid, blood, or pus coming from your wound.  · You notice a change in the color of your skin near  your wound.  · You need to change the bandage often because fluid, blood, or pus is coming from the wound.  · You start to have a new rash.  · You start to have numbness around the wound.  GET HELP RIGHT AWAY IF:  · You have very bad swelling around the wound.  · Your pain suddenly gets worse and is very bad.  · You notice painful lumps near the wound or on skin that is anywhere on your body.  · You have a red streak going away from your wound.  · The wound is on your hand or foot and you cannot move a finger or toe like you usually can.  · The wound is on your hand or foot and you notice that your fingers or toes look pale or bluish.     This information is not intended to replace advice given to you by your health care provider. Make sure you discuss any questions you have with your health care provider.     Document Released: 06/05/2009 Document Revised: 05/03/2016 Document Reviewed: 12/14/2015  [employer] Interactive Patient Education ©2016 [employer]            Patient Information     Patient Information    Following emergency treatment: all patient requiring follow-up care must return either to a private physician or a clinic if your condition worsens before you are able to obtain further medical attention, please return to the emergency room.     Billing Information    At [facility], we work to make the billing process streamlined for our patients.  Our Representatives are here to answer any questions you may have regarding your hospital bill.  If you have insurance coverage and have supplied your insurance information to us, we will submit a claim to your insurer on your behalf.  Should you have any questions regarding your bill, we can be reached online or by phone as follows:  Online: You are able pay your bills online or live chat with our representatives about any billing questions you may have. We are here to help Monday - Friday from 8:00am to 7:30pm and 9:00am - 12:00pm on Saturdays.  Please visit  [email or web address]  for more information.   Phone:  [phone] or [phone]    Please note that your emergency physician, surgeon, pathologist, radiologist, anesthesiologist, and other specialists are not employed by [facility] and will therefore bill separately for their services.  Please contact them directly for any questions concerning their bills at the numbers below:     Emergency Physician Services:  [phone]  [facility]:  [phone]  [facility]:  [phone]  [facility]:  [phone]    1. Your final bill may vary from the amount quoted upon discharge if all procedures are not complete at that time, or if your doctor has additional procedures of which we are not aware. You will receive an additional bill if you return to the Emergency Department at [facility] for suture removal regardless of the facility of which the sutures were placed.     2. Please arrange for settlement of this account at the emergency registration.    3. All self-pay accounts are due in full at the time of treatment.  If you are unable to meet this obligation then payment is expected within 4-5 days.     4. If you have had radiology studies (CT, X-ray, Ultrasound, MRI), you have received a preliminary result during your emergency department visit. Please contact the radiology department [phone] to receive a copy of your final result. Please discuss the Final result with your primary physician or with the follow up physician provided.     Crisis Hotline:  [place] Crisis Hotline:  3-534-OTCOQRQ or 1-695.817.1885  [place] Crisis Hotline:    1-331.261.6691 or 375-457-3002         ED Discharge Follow Up Questions    1. In order to provide you with very good care, we would like to follow up with a phone call in the next few days.  May we have your permission to contact you?     YES /  NO    2. What is the best phone number to call  you? (       )_____-__________    3. What is the best time to call you?      Morning  /  Afternoon  /  Evening                   Patient Signature:  ____________________________________________________________    Date:  ____________________________________________________________      Your appointments     Oct 11, 2017 10:00 AM   Established Patient with Mahendra Morrow M.D.   [facility] [address] [place] [address] Drive  [facility] [address]   [phone]           You will be receiving a confirmation call a few days before your appointment from our automated call confirmation system.

## 2017-08-09 NOTE — ED AVS SNAPSHOT
Suagi.com Access Code: Activation code not generated  Current Suagi.com Status: Active    VirtualLogixhart  A secure, online tool to manage your health information     [employer]’s Suagi.com® is a secure, online tool that connects you to your personalized health information from the privacy of your home -- day or night - making it very easy for you to manage your healthcare. Once the activation process is completed, you can even access your medical information using the Suagi.com tiffani, which is available for free in the Apple Tiffani store or Google Play store.     Suagi.com provides the following levels of access (as shown below):   My Chart Features   [facility] Primary Care Doctor [facility]  Specialists [facility]  Urgent  Care Non-[facility]  Primary Care  Doctor   Email your healthcare team securely and privately 24/7 X X X X   Manage appointments: schedule your next appointment; view details of past/upcoming appointments X      Request prescription refills. X      View recent personal medical records, including lab and immunizations X X X X   View health record, including health history, allergies, medications X X X X   Read reports about your outpatient visits, procedures, consult and ER notes X X X X   See your discharge summary, which is a recap of your hospital and/or ER visit that includes your diagnosis, lab results, and care plan. X X       How to register for Suagi.com:  1. Go to  [email or web address].  2. Click on the Sign Up Now box, which takes you to the New Member Sign Up page. You will need to provide the following information:  a. Enter your Suagi.com Access Code exactly as it appears at the top of this page. (You will not need to use this code after you’ve completed the sign-up process. If you do not sign up before the expiration date, you must request a new code.)   b. Enter your date of birth.   c. Enter your home email address.   d. Click Submit, and follow the next screen’s instructions.  3. Create a Suagi.com ID. This will  be your Intelli[employer] login ID and cannot be changed, so think of one that is secure and easy to remember.  4. Create a IntelliChem password. You can change your password at any time.  5. Enter your Password Reset Question and Answer. This can be used at a later time if you forget your password.   6. Enter your e-mail address. This allows you to receive e-mail notifications when new information is available in IntelliChem.  7. Click Sign Up. You can now view your health information.    For assistance activating your IntelliChem account, call [phone]

## 2017-08-10 NOTE — ED NOTES
Dressing applied by  TECH, then released with all follow-up, wound care and what to watch for signs of infection. Left with her friend.

## 2017-08-10 NOTE — DISCHARGE INSTRUCTIONS
Laceration Care, Adult  A laceration is a cut that goes through all layers of the skin. The cut also goes into the tissue that is right under the skin. Some cuts heal on their own. Others need to be closed with stitches (sutures), staples, skin adhesive strips, or wound glue. Taking care of your cut lowers your risk of infection and helps your cut to heal better.  HOW TO TAKE CARE OF YOUR CUT  For stitches or staples:  · Keep the wound clean and dry.  · If you were given a bandage (dressing), you should change it at least one time per day or as told by your doctor. You should also change it if it gets wet or dirty.  · Keep the wound completely dry for the first 24 hours or as told by your doctor. After that time, you may take a shower or a bath. However, make sure that the wound is not soaked in water until after the stitches or staples have been removed.  · Clean the wound one time each day or as told by your doctor:  ¨ Wash the wound with soap and water.  ¨ Rinse the wound with water until all of the soap comes off.  ¨ Pat the wound dry with a clean towel. Do not rub the wound.  · After you clean the wound, put a thin layer of antibiotic ointment on it as told by your doctor. This ointment:  ¨ Helps to prevent infection.  ¨ Keeps the bandage from sticking to the wound.  · Have your stitches or staples removed as told by your doctor.  If your doctor used skin adhesive strips:   · Keep the wound clean and dry.  · If you were given a bandage, you should change it at least one time per day or as told by your doctor. You should also change it if it gets dirty or wet.  · Do not get the skin adhesive strips wet. You can take a shower or a bath, but be careful to keep the wound dry.  · If the wound gets wet, pat it dry with a clean towel. Do not rub the wound.  · Skin adhesive strips fall off on their own. You can trim the strips as the wound heals. Do not remove any strips that are still stuck to the wound. They will  fall off after a while.  If your doctor used wound glue:  · Try to keep your wound dry, but you may briefly wet it in the shower or bath. Do not soak the wound in water, such as by swimming.  · After you take a shower or a bath, gently pat the wound dry with a clean towel. Do not rub the wound.  · Do not do any activities that will make you really sweaty until the skin glue has fallen off on its own.  · Do not apply liquid, cream, or ointment medicine to your wound while the skin glue is still on.  · If you were given a bandage, you should change it at least one time per day or as told by your doctor. You should also change it if it gets dirty or wet.  · If a bandage is placed over the wound, do not let the tape for the bandage touch the skin glue.  · Do not pick at the glue. The skin glue usually stays on for 5-10 days. Then, it falls off of the skin.  General Instructions   · To help prevent scarring, make sure to cover your wound with sunscreen whenever you are outside after stitches are removed, after adhesive strips are removed, or when wound glue stays in place and the wound is healed. Make sure to wear a sunscreen of at least 30 SPF.  · Take over-the-counter and prescription medicines only as told by your doctor.  · If you were given antibiotic medicine or ointment, take or apply it as told by your doctor. Do not stop using the antibiotic even if your wound is getting better.  · Do not scratch or pick at the wound.  · Keep all follow-up visits as told by your doctor. This is important.  · Check your wound every day for signs of infection. Watch for:  ¨ Redness, swelling, or pain.  ¨ Fluid, blood, or pus.  · Raise (elevate) the injured area above the level of your heart while you are sitting or lying down, if possible.  GET HELP IF:  · You got a tetanus shot and you have any of these problems at the injection site:  ¨ Swelling.  ¨ Very bad pain.  ¨ Redness.  ¨ Bleeding.  · You have a fever.  · A wound that was  closed breaks open.  · You notice a bad smell coming from your wound or your bandage.  · You notice something coming out of the wound, such as wood or glass.  · Medicine does not help your pain.  · You have more redness, swelling, or pain at the site of your wound.  · You have fluid, blood, or pus coming from your wound.  · You notice a change in the color of your skin near your wound.  · You need to change the bandage often because fluid, blood, or pus is coming from the wound.  · You start to have a new rash.  · You start to have numbness around the wound.  GET HELP RIGHT AWAY IF:  · You have very bad swelling around the wound.  · Your pain suddenly gets worse and is very bad.  · You notice painful lumps near the wound or on skin that is anywhere on your body.  · You have a red streak going away from your wound.  · The wound is on your hand or foot and you cannot move a finger or toe like you usually can.  · The wound is on your hand or foot and you notice that your fingers or toes look pale or bluish.     This information is not intended to replace advice given to you by your health care provider. Make sure you discuss any questions you have with your health care provider.     Document Released: 06/05/2009 Document Revised: 05/03/2016 Document Reviewed: 12/14/2015  [employer] Interactive Patient Education ©2016 [employer]

## 2017-08-10 NOTE — ED NOTES
Pt bib caregiver after GLF. Pt states she tripped and landed on her right arm. Denies head injury. Denies LOC

## 2017-08-10 NOTE — ED PROVIDER NOTES
ED Provider Note    CHIEF COMPLAINT  Chief Complaint   Patient presents with   • Laceration       HPI  Barbara Murray is a 88 y.o. female who presents shortly after she tripped in the garden and landed in the  with her dominant right arm. She scraped her arm on a recently trimmed shrub, opening up the skin. She denies head injury. She has minimal pain. No numbness or tingling. Her last tetanus vaccination was over 10 years ago.    REVIEW OF SYSTEMS  See HPI for further details.     PAST MEDICAL HISTORY  Past Medical History   Diagnosis Date   • CAD (coronary artery disease) August 2013     Coronary angiogram with 40-50% stenosis of distal PDA, diagonal ostial 40-50%, RCA ostial 60-70%. LVEF 45-50%   • Other malaise and fatigue    • Reflux esophagitis    • Arrest of bone development or growth    • Symptomatic menopausal or female climacteric states    • Disorder of bone and cartilage, unspecified    • Disorders of bursae and tendons in shoulder region, unspecified    • Degeneration of lumbar or lumbosacral intervertebral disc    • Other extrapyramidal disease and abnormal movement disorder    • Cellulitis and abscess of upper arm and forearm    • Rotator cuff (capsule) sprain    • Hypertonicity of bladder    • Osteoporosis, unspecified    • Anesthesia      During cataract procdure, not completely effective   • Unspecified hemorrhagic conditions      Nosebleeds   • Unspecified urinary incontinence    • Other specified disorder of intestines      Diarrhea   • Pneumonia    • Glaucoma    • Arrhythmia    • CATARACT      Status post removal   • Hypertension    • Abnormal EKG    • Atrial fibrillation (CMS-HCC) September 2014     Echocardiogram with LVEF 45-50%, mild MS, moderate MR, moderate TR. RVSP 50-55mmHg.   • Hyperlipidemia    • Arthritis    • MI (myocardial infarction) (CMS-HCC)     • CHF (congestive heart failure) (CMS-HCC) 5/11/2015   • Hiatus hernia syndrome    • Stroke (CMS-HCC) August 2013     speech deficit    • Heart burn    • Pain      low back,pain scale 7   • Backpain    • Pain      R leg, R hand   • High cholesterol    • Coronary heart disease    • Crohns disease (CMS-HCC)    • Rheumatoid arthritis (CMS-HCC)    • Restless leg syndrome    • Chronic UTI (urinary tract infection) 3/30/2017   • Severe tricuspid regurgitation by prior echocardiogram 11/5/2016       FAMILY HISTORY  Family History   Problem Relation Age of Onset   • Heart Disease Mother    • Heart Failure Mother    • Lung Disease Father    • Stroke Sister        SOCIAL HISTORY  Social History     Social History   • Marital Status: Single     Spouse Name: N/A   • Number of Children: N/A   • Years of Education: N/A     Social History Main Topics   • Smoking status: Former Smoker -- 2 years     Types: Cigarettes     Start date: 01/01/1956     Quit date: 11/19/1958   • Smokeless tobacco: Never Used      Comment: .5 ppd 9 yrs,quit 1966   • Alcohol Use: Yes      Comment: 1 per week   • Drug Use: No   • Sexual Activity: Not Currently     Birth Control/ Protection: Post-Menopausal     Other Topics Concern   • None     Social History Narrative       SURGICAL HISTORY  Past Surgical History   Procedure Laterality Date   • Tonsillectomy  1952   • Fannie by laparoscopy  1994   • Cataract phaco with iol  2007   • Rotator cuff repair Right 2008     dr bills   • Other       Epidural    • Other  1935     Tube insertion,left lung   • Other       Spinal meningitis   • Knee arthroplasty total  6/21/2010     Performed by INNA BILLS at [facility]   • Inguinal hernia repair bilateral  3/2/2011     Performed by NIKOLE BELTRAN at [facility]   • Gastroscopy with balloon dilatation  7/25/2013     Performed by Jed Hoyt Jr., M.D. at [facility]   • Carpal tunnel release Right 10/20/2015     Procedure: CARPAL TUNNEL RELEASE OPEN;  Surgeon: Ar Macario M.D.;  Location: [facility];  Service:    • Trigger finger  "release Right 10/20/2015     Procedure: TRIGGER FINGER RELEASE MIDDLE AND RING;  Surgeon: Ar Macario M.D.;  Location: SURGERY [facility];  Service:    • Cholecystectomy     • Pr remv 2nd cataract,corn-scler sectn     • Arthroscopy, knee         CURRENT MEDICATIONS    Current facility-administered medications:   •  tetanus-dipth-acell pertussis (ADACEL) inj 0.5 mL, 0.5 mL, Intramuscular, Once PRN, Tammy Crane M.D.    Current outpatient prescriptions:   •  Iron Tab, Take 65 mg by mouth every day at 6 PM., Disp: , Rfl:   •  doxycycline (MONODOX) 100 MG capsule, , Disp: , Rfl:   •  ascorbic acid (ASCORBIC ACID) 500 MG Tab, Take 500 mg by mouth every day., Disp: , Rfl:   •  carvedilol (COREG) 6.25 MG Tab, Take 1 Tab by mouth every day., Disp: , Rfl:   •  furosemide (LASIX) 20 MG Tab, Take 1 Tab by mouth every day., Disp: , Rfl:   •  oxybutynin (DITROPAN) 5 MG Tab, Take 1 Tab by mouth every day., Disp: , Rfl:   •  apixaban (ELIQUIS) 2.5mg Tab, Take 1 Tab by mouth 2 Times a Day., Disp: 180 Tab, Rfl: 3  •  rosuvastatin (CRESTOR) 20 MG Tab, TAKE 1 TABLET ORALLY DAILY EVERY EVENING, Disp: 90 Tab, Rfl: 3  •  lisinopril (PRINIVIL) 5 MG Tab, Take 1 Tab by mouth every day., Disp: 90 Tab, Rfl: 4  •  ropinirole (REQUIP) 1 MG Tab, Take 1 Tab by mouth 3 times a day., Disp: 90 Tab, Rfl: 11  •  tramadol (ULTRAM) 50 MG Tab, Take 1-2 Tabs by mouth every four hours as needed. Indications: Moderate to Moderately Severe Pain, Disp: , Rfl:   •  Cholecalciferol (VITAMIN D) 2000 UNITS Cap, Take 1 Cap by mouth every day., Disp: 100 Cap, Rfl: 4  •  latanoprost (XALATAN) 0.005 % SOLN, Place 1 Drop in both eyes every evening., Disp: , Rfl:   •  betaxolol (BETOPTIC-S) 0.25 % SUSP, Place 1 Drop in both eyes every day., Disp: , Rfl:       ALLERGIES  Allergies   Allergen Reactions   • Pcn [Penicillins] Hives       PHYSICAL EXAM  VITAL SIGNS: /81 mmHg  Pulse 105  Temp(Src) 36.9 °C (98.5 °F)  Resp 20  Ht 1.549 m (5' 1\")  Wt " 49.896 kg (110 lb)  BMI 20.80 kg/m2  SpO2 96% @[facility][[id number]::@   Constitutional: Well developed, Well nourished, No acute respiratory distress, Non-toxic appearance.   HENT: Normocephalic, Atraumatic, Bilateral external ears normal, Oropharynx clear, mucous membranes are moist.  Eyes: Conjunctiva normal, No discharge. No icterus.  Neck: Normal range of motion. Supple.  Skin: Warm, Dry, No erythema, No rash. Full-thickness 5 cm, heavily contaminated, V shaped laceration to the lateral right proximal forearm. There is also a 1.5 cm partial skin avulsion to the right brachium with a devitalized flap overlying the adipose tissue.  Musculoskeletal: Good range of motion in all major joints. Normal gait. Full range of motion right upper extremity including supination and pronation. No pain with longitudinal compression through the right forearm.  Neurologic: Alert & oriented x 3. No focal deficits noted. She has intact right hand     Laceration Repair Procedure Note    Indication: Laceration    Procedure: The patient was placed in the appropriate position and anesthesia around the lacerations were obtained by infiltration using 3.0 cc of 2% Lidocaine with epinephrine. The area was then cleansed with betadine and draped in a sterile fashion, debrided and irrigated with high pressure normal saline. The laceration was closed with staples. A second laceration was debrided and dressed. The wound area was then dressed with bacitracin, a sterile dressing and Coban.      Total repaired wound length: 6 cm.     Other Items: Staple count: 6    The patient tolerated the procedure well.    Complications: None          COURSE & MEDICAL DECISION MAKING  Pertinent Labs & Imaging studies reviewed. (See chart for details)  Patient is given an Adacel vaccination.     The patient will return for new or worsening symptoms and is stable at the time of discharge.    The patient is referred to a primary physician for blood pressure management,  diabetic screening, and for all other preventative health concerns.    DISPOSITION:  Patient will be discharged home in stable condition.    FOLLOW UP:  Mahendra Morrow M.D.  [address]  [place] [id number] [phone]    In 8 days  For suture removal      OUTPATIENT MEDICATIONS:  New Prescriptions    No medications on file           FINAL IMPRESSION  1. Laceration of arm, right, multiple sites, initial encounter               Electronically signed by: Tammy Crane, 8/9/2017 6:22 PM

## 2017-08-16 ENCOUNTER — TELEPHONE (OUTPATIENT)
Dept: MEDICAL GROUP | Age: 82
End: 2017-08-16

## 2017-08-16 NOTE — TELEPHONE ENCOUNTER
ESTABLISHED PATIENT PRE-VISIT PLANNING     Note: Patient will not be contacted if there is no indication to call.     1.  Reviewed notes from the last few office visits within the medical group: Yes    2.  If any orders were placed at last visit or intended to be done for this visit (i.e. 6 mos follow-up), do we have Results/Consult Notes?        •  Labs - Not coming in for a lab review.       •  Imaging - Imaging was not ordered at last office visit.       •  Referrals - No referrals were ordered at last office visit.    3. Is this appointment scheduled as a Hospital Follow-Up? No    4.  Immunizations were updated in [facility] using WebIZ?: Yes       •  Web Iz Recommendations: FLU and ZOSTAVAX (Shingles)    5.  Patient is due for the following Health Maintenance Topics:   Health Maintenance Due   Topic Date Due   • Annual Wellness Visit  08/10/1928   • IMM ZOSTER VACCINE  08/10/1988       - Patient has completed PNEUMOVAX (PPSV23), PREVNAR (PCV13)  and TDAP Immunization(s) per WebIZ. Chart has been updated.    6.  Patient was NOT informed to arrive 15 min prior to their scheduled appointment and bring in their medication bottles.

## 2017-08-17 ENCOUNTER — OFFICE VISIT (OUTPATIENT)
Dept: MEDICAL GROUP | Age: 82
End: 2017-08-17
Payer: MEDICARE

## 2017-08-17 VITALS
WEIGHT: 113.6 LBS | BODY MASS INDEX: 22.3 KG/M2 | HEART RATE: 69 BPM | TEMPERATURE: 98.2 F | OXYGEN SATURATION: 95 % | SYSTOLIC BLOOD PRESSURE: 118 MMHG | DIASTOLIC BLOOD PRESSURE: 82 MMHG | HEIGHT: 60 IN

## 2017-08-17 DIAGNOSIS — Z51.89 ENCOUNTER FOR POST-TRAUMATIC WOUND CHECK: ICD-10-CM

## 2017-08-17 DIAGNOSIS — L03.113 CELLULITIS OF FOREARM, RIGHT: ICD-10-CM

## 2017-08-17 PROCEDURE — 99214 OFFICE O/P EST MOD 30 MIN: CPT | Performed by: INTERNAL MEDICINE

## 2017-08-17 RX ORDER — TRAMADOL HYDROCHLORIDE 100 MG/1
100 TABLET, EXTENDED RELEASE ORAL
Refills: 1 | COMMUNITY
Start: 2017-07-21 | End: 2018-01-01

## 2017-08-17 RX ORDER — SULFAMETHOXAZOLE AND TRIMETHOPRIM 800; 160 MG/1; MG/1
1 TABLET ORAL 2 TIMES DAILY
Qty: 14 TAB | Refills: 0 | Status: SHIPPED | OUTPATIENT
Start: 2017-08-17 | End: 2017-08-24

## 2017-08-17 ASSESSMENT — PAIN SCALES - GENERAL: PAINLEVEL: NO PAIN

## 2017-08-17 NOTE — ASSESSMENT & PLAN NOTE
The surrounding tissue of her right forearm wound appears to be warm and red. She did not receive antibiotic from ER. She reported mild pain around the wound and has some yellowish discharge. She denies fever, chills, nausea, vomiting or decreased appetite. She is allergic to penicillin. Her vitals in clinic are stable.

## 2017-08-17 NOTE — PROGRESS NOTES
Subjective:   Barbara Murray is a 89 y.o. female here today for evaluation and management of:      Encounter for post-traumatic wound check  Patient had mechanical fall at home and was evaluated in ER on 8/9/17. She had laceration warm on the right forearm and she was stitched by stapler in the ER. She denied head injury and denying dizzy, nauseous vomiting. She is here for wound check and remove the staples.    Cellulitis of forearm, right  The surrounding tissue of her right forearm wound appears to be warm and red. She did not receive antibiotic from ER. She reported mild pain around the wound and has some yellowish discharge. She denies fever, chills, nausea, vomiting or decreased appetite. She is allergic to penicillin. Her vitals in clinic are stable.         Current medicines (including changes today)  Current Outpatient Prescriptions   Medication Sig Dispense Refill   • tramadol (ULTRAM-ER) 100 MG tablet Take 100 mg by mouth 1 time daily as needed.  1   • sulfamethoxazole-trimethoprim (BACTRIM DS) 800-160 MG tablet Take 1 Tab by mouth 2 times a day for 7 days. 14 Tab 0   • Iron Tab Take 65 mg by mouth every day at 6 PM.     • doxycycline (MONODOX) 100 MG capsule      • ascorbic acid (ASCORBIC ACID) 500 MG Tab Take 500 mg by mouth every day.     • carvedilol (COREG) 6.25 MG Tab Take 1 Tab by mouth every day.     • furosemide (LASIX) 20 MG Tab Take 1 Tab by mouth every day.     • oxybutynin (DITROPAN) 5 MG Tab Take 1 Tab by mouth every day.     • apixaban (ELIQUIS) 2.5mg Tab Take 1 Tab by mouth 2 Times a Day. 180 Tab 3   • rosuvastatin (CRESTOR) 20 MG Tab TAKE 1 TABLET ORALLY DAILY EVERY EVENING 90 Tab 3   • lisinopril (PRINIVIL) 5 MG Tab Take 1 Tab by mouth every day. 90 Tab 4   • ropinirole (REQUIP) 1 MG Tab Take 1 Tab by mouth 3 times a day. 90 Tab 11   • tramadol (ULTRAM) 50 MG Tab Take 1-2 Tabs by mouth every four hours as needed. Indications: Moderate to Moderately Severe Pain     • Cholecalciferol  (VITAMIN D) 2000 UNITS Cap Take 1 Cap by mouth every day. 100 Cap 4   • latanoprost (XALATAN) 0.005 % SOLN Place 1 Drop in both eyes every evening.     • betaxolol (BETOPTIC-S) 0.25 % SUSP Place 1 Drop in both eyes every day.       No current facility-administered medications for this visit.     She  has a past medical history of CAD (coronary artery disease) (August 2013); Other malaise and fatigue; Reflux esophagitis; Arrest of bone development or growth; Symptomatic menopausal or female climacteric states; Disorder of bone and cartilage, unspecified; Disorders of bursae and tendons in shoulder region, unspecified; Degeneration of lumbar or lumbosacral intervertebral disc; Other extrapyramidal disease and abnormal movement disorder; Cellulitis and abscess of upper arm and forearm; Rotator cuff (capsule) sprain; Hypertonicity of bladder; Osteoporosis, unspecified; Anesthesia; Unspecified hemorrhagic conditions; Unspecified urinary incontinence; Other specified disorder of intestines; Pneumonia; Glaucoma; Arrhythmia; CATARACT; Hypertension; Abnormal EKG; Atrial fibrillation (CMS-HCC) (September 2014); Hyperlipidemia; Arthritis; MI (myocardial infarction) (CMS-HCC) ( ); CHF (congestive heart failure) (CMS-HCC) (5/11/2015); Hiatus hernia syndrome; Stroke (CMS-HCC) (August 2013); Heart burn; Pain; Backpain; Pain; High cholesterol; Coronary heart disease; Crohns disease (CMS-HCC); Rheumatoid arthritis (CMS-HCC); Restless leg syndrome; Chronic UTI (urinary tract infection) (3/30/2017); and Severe tricuspid regurgitation by prior echocardiogram (11/5/2016). She also has no past medical history of Diabetes, Psychiatric problem, Pacemaker, Heart murmur, ASTHMA, Jaundice, Personal history of venous thrombosis and embolism, Seizure (CMS-HCC), Angina, Rheumatic fever, Dialysis, Cancer (CMS-HCC), Unspecified disorder of thyroid, Bronchitis, COPD, Other specified symptom associated with female genital organs, or Fall.    ROS    No chest pain, no shortness of breath, no abdominal pain       Objective:     Blood pressure 118/82, pulse 69, temperature 36.8 °C (98.2 °F), height 1.524 m (5'), weight 51.529 kg (113 lb 9.6 oz), SpO2 95 %, not currently breastfeeding. Body mass index is 22.19 kg/(m^2).   Physical Exam:  General: Alert, oriented and no acute distress.  Eye contact is good, speech goal directed, affect calm  HEENT: conjunctiva non-injected, sclera non-icteric.  Oral mucous membranes pink and moist with no lesions.  Pinna normal.  Lungs: Normal respiratory effort, clear to auscultation bilaterally with good excursion.  CV: Irregular rate and rhythm. No murmurs.  Abdomen: soft, non distended, nontender, Bowel sound normal.  Ext: no edema, color normal, vascularity normal, temperature normal  Musculoskeletal exam: V shaped lacerated wound on right forearm with stables. Surrounding tissue of lacerated wound appeared to be warm, redness and tenderness on palpation. Some serosanguineous discharge from wound.     Assessment and Plan:   The following treatment plan was discussed     1. Encounter for post-traumatic wound check  - Stables were removed in clinic and provided proper dressing. Advised and educated patient and caregiver for dressing changes and wound care.   - Provided sterile Leukostrips and dressing supply for patient.  - sulfamethoxazole-trimethoprim (BACTRIM DS) 800-160 MG tablet; Take 1 Tab by mouth 2 times a day for 7 days.  Dispense: 14 Tab; Refill: 0    2. Cellulitis of forearm, right  - Prescribed Bactrim DS to take twice a day for 7 days for cellulitis changes on the surrounding tissue of wound.   - Reviewed the potential side effect of Bactrim with patient and caregiver and advised her to take probiotic daily with antibiotic.   - sulfamethoxazole-trimethoprim (BACTRIM DS) 800-160 MG tablet; Take 1 Tab by mouth 2 times a day for 7 days.  Dispense: 14 Tab; Refill: 0    Patient already has appointment with her PCP,   Odalys on 10/11/17. Advised patient to do blood tests one week before follow-up with her PCP. Patient is advised to return to clinic sooner if her symptoms do not improve or if she has any worsening symptoms.    Followup: Return in about 8 weeks (around 10/11/2017), or if symptoms worsen or fail to improve, for hypertension, hyperlipidemia, chronic kidney disease stage III, lab review.      Please note that this dictation was created using voice recognition software. I have made every reasonable attempt to correct obvious errors, but I expect that there may have unintended errors in text, spelling, punctuation, or grammar that I did not discover.

## 2017-08-17 NOTE — MR AVS SNAPSHOT
Barbara Sarmientokashif Murray   2017 9:20 AM   Office Visit   MRN: [id number]    Department:  [address] [place] [address]   Dept Phone:  [phone]    Description:  Female : 8/10/1928   Provider:  Carmenza Chávez M.D.           Reason for Visit     Suture / Staple Removal R arm    Wound Check           Allergies as of 2017     Allergen Noted Reactions    Pcn [Penicillins] 2010   Hives      You were diagnosed with     Encounter for post-traumatic wound check   [813622]       Cellulitis of forearm, right   [[id number]]         Vital Signs     Blood Pressure Pulse Temperature Height Weight Body Mass Index    118/82 mmHg 69 36.8 °C (98.2 °F) 1.524 m (5') 51.529 kg (113 lb 9.6 oz) 22.19 kg/m2    Oxygen Saturation Breastfeeding? Smoking Status             95% No Former Smoker         Basic Information     Date Of Birth Sex Race Ethnicity Preferred Language    8/10/1928 Female White Non- English      Your appointments     Oct 11, 2017 10:00 AM   Established Patient with Mahendra Morrow M.D.   [address] [id number] [phone]           You will be receiving a confirmation call a few days before your appointment from our automated call confirmation system.              Problem List              ICD-10-CM Priority Class Noted - Resolved    Esophageal stricture- s/p dilation dr foley K22.2   2013 - Present    MI (myocardial infarction) (CMS-HCC)- 2013: Non STEMI. dr scruggs I21.3 High  2013 - Present    Glaucoma H40.9 Low  10/7/2013 - Present    Chronic atrial fibrillation- rx Eliquis; dr scruggs I48.2 Medium  2014 - Present    Chronic anticoagulation- Eliquis (for A.Fib)- dr scruggs Z79.01 Low  2015 - Present    Iron deficiency anemia D50.9 Medium  2015 - Present    Moderate to severe pulmonary hypertension (CMS-HCC) I27.2 High  2015 - Present    Coronary artery disease involving native coronary artery of native  heart without angina pectoris- non STEMI 2013; no stents or surgery; dr scruggs I25.10   10/6/2015 - Present    CVA, old, speech/language deficit- 2013 I69.328 Low  10/21/2015 - Present    Chronic pain syndrome-Dr. Capellan; tramadol rxd G89.4   4/20/2016 - Present    Urinary frequency R35.0   10/11/2016 - Present    Chronic systolic heart failure (CMS-HCC)- EF=50%, Nov., 2016 ECHO- dr scruggs I50.22 High  11/5/2016 - Present    CKD (chronic kidney disease) stage 3, GFR 30-59 ml/min N18.3 Low  11/5/2016 - Present    Mixed hyperlipidemia E78.2 Low  11/5/2016 - Present    Essential hypertension I10 Low  11/5/2016 - Present    Severe tricuspid regurgitation by prior echocardiogram I07.1 Low  11/5/2016 - Present    CAD (coronary artery disease) I25.10 Low  11/5/2016 - Present    RLS (restless legs syndrome) G25.81 Low  11/5/2016 - Present    Chronic UTI (urinary tract infection) N39.0   3/30/2017 - Present    Abnormal CT scan, chest- [facility] dr hoover R93.8   6/22/2017 - Present    Encounter for post-traumatic wound check Z51.89   8/17/2017 - Present    Cellulitis of forearm, right L03.113   8/17/2017 - Present      Health Maintenance        Date Due Completion Dates    IMM ZOSTER VACCINE 8/10/1988 ---    IMM INFLUENZA (1) 9/1/2017 10/28/2013, 10/1/2012    BONE DENSITY 7/8/2019 7/8/2014, 4/12/2011    IMM DTaP/Tdap/Td Vaccine (2 - Td) 8/9/2027 8/9/2017            Current Immunizations     13-VALENT PCV PREVNAR 12/7/2015, 10/21/2015    Influenza TIV (IM) 10/28/2013, 10/1/2012    Pneumococcal polysaccharide vaccine (PPSV-23) 8/1/2008    Tdap Vaccine 8/9/2017  6:32 PM      Below and/or attached are the medications your provider expects you to take. Review all of your home medications and newly ordered medications with your provider and/or pharmacist. Follow medication instructions as directed by your provider and/or pharmacist. Please keep your medication list with you and share with your provider. Update the information when  medications are discontinued, doses are changed, or new medications (including over-the-counter products) are added; and carry medication information at all times in the event of emergency situations     Allergies:  PCN - Hives               Medications  Valid as of: August 17, 2017 -  3:52 PM    Generic Name Brand Name Tablet Size Instructions for use    Apixaban (Tab) ELIQUIS 2.5mg Take 1 Tab by mouth 2 Times a Day.        Ascorbic Acid (Tab) ascorbic acid 500 MG Take 500 mg by mouth every day.        Betaxolol HCl (Suspension) BETOPTIC-S 0.25 % Place 1 Drop in both eyes every day.        Carvedilol (Tab) COREG 6.25 MG Take 1 Tab by mouth every day.        Cholecalciferol (Cap) Vitamin D 2000 units Take 1 Cap by mouth every day.        Doxycycline Monohydrate (Cap) MONODOX 100 MG         Furosemide (Tab) LASIX 20 MG Take 1 Tab by mouth every day.        Iron Combinations (Tab) Iron  Take 65 mg by mouth every day at 6 PM.        Latanoprost (Solution) XALATAN 0.005 % Place 1 Drop in both eyes every evening.        Lisinopril (Tab) PRINIVIL 5 MG Take 1 Tab by mouth every day.        Oxybutynin Chloride (Tab) DITROPAN 5 MG Take 1 Tab by mouth every day.        ROPINIRole HCl (Tab) REQUIP 1 MG Take 1 Tab by mouth 3 times a day.        Rosuvastatin Calcium (Tab) CRESTOR 20 MG TAKE 1 TABLET ORALLY DAILY EVERY EVENING        Sulfamethoxazole-Trimethoprim (Tab) BACTRIM -160 MG Take 1 Tab by mouth 2 times a day for 7 days.        TraMADol HCl (Tab) ULTRAM 50 MG Take 1-2 Tabs by mouth every four hours as needed. Indications: Moderate to Moderately Severe Pain        TraMADol HCl (TABLET SR 24 HR) ULTRAM- MG Take 100 mg by mouth 1 time daily as needed.        .                 Medicines prescribed today were sent to:     [id number] - [place] - [address]    [address] [place] [address]    Phone: [phone] Fax: [phone]    Open 24 Hours?: No      Medication refill instructions:        If your prescription bottle indicates you have medication refills left, it is not necessary to call your provider’s office. Please contact your pharmacy and they will refill your medication.    If your prescription bottle indicates you do not have any refills left, you may request refills at any time through one of the following ways: The online Mediamorph system (except Urgent Care), by calling your provider’s office, or by asking your pharmacy to contact your provider’s office with a refill request. Medication refills are processed only during regular business hours and may not be available until the next business day. Your provider may request additional information or to have a follow-up visit with you prior to refilling your medication.   *Please Note: Medication refills are assigned a new Rx number when refilled electronically. Your pharmacy may indicate that no refills were authorized even though a new prescription for the same medication is available at the pharmacy. Please request the medicine by name with the pharmacy before contacting your provider for a refill.           Mediamorph Access Code: Activation code not generated  Current Mediamorph Status: Active

## 2017-08-17 NOTE — ASSESSMENT & PLAN NOTE
Patient had mechanical fall at home and was evaluated in ER on 8/9/17. She had laceration warm on the right forearm and she was stitched by stapler in the ER. She denied head injury and denying dizzy, nauseous vomiting. She is here for wound check and remove the staples.

## 2017-08-24 ENCOUNTER — OFFICE VISIT (OUTPATIENT)
Dept: MEDICAL GROUP | Age: 82
End: 2017-08-24
Payer: MEDICARE

## 2017-08-24 VITALS
HEIGHT: 60 IN | TEMPERATURE: 98.1 F | BODY MASS INDEX: 21.79 KG/M2 | WEIGHT: 111 LBS | HEART RATE: 75 BPM | SYSTOLIC BLOOD PRESSURE: 134 MMHG | OXYGEN SATURATION: 95 % | DIASTOLIC BLOOD PRESSURE: 72 MMHG

## 2017-08-24 DIAGNOSIS — Z78.9 MEDICATION INTOLERANCE: ICD-10-CM

## 2017-08-24 DIAGNOSIS — L03.113 CELLULITIS OF RIGHT UPPER EXTREMITY: Primary | ICD-10-CM

## 2017-08-24 DIAGNOSIS — Z51.89 VISIT FOR WOUND CHECK: ICD-10-CM

## 2017-08-24 PROCEDURE — 99214 OFFICE O/P EST MOD 30 MIN: CPT | Performed by: FAMILY MEDICINE

## 2017-08-24 RX ORDER — CLINDAMYCIN HYDROCHLORIDE 300 MG/1
300 CAPSULE ORAL 3 TIMES DAILY
Qty: 21 CAP | Refills: 0 | Status: SHIPPED | OUTPATIENT
Start: 2017-08-24 | End: 2017-08-31

## 2017-08-24 NOTE — PROGRESS NOTES
Subjective:   CC: wound check    HPI:     Barbara Mruray is a 89 y.o. female, established patient of the clinic, presents with the following concerns:     Pt was in her normal state of health until 3 weeks ago when she suffered a mechanical fall with right forearm impacted the recently trimmed scrub leading to skin laceration. Pt was seen by ED on 8/9/2017. The laceration was approximated with staples which were removed on 8/17/2017. Dr. Chávez was concerned with possible wound infection and prescribed a short course of Bactrim. Pt took Bactrim for 3 days and c/o severe nausea with this medication (no vomiting). Pt states that she was not able to eat or sleep yesterday due to severe nausea. She denies fever, chills, spreading erythema from the wound, or worsening pain. She endorses clear yellowish discharge.    Current medicines (including changes today)  Current Outpatient Prescriptions   Medication Sig Dispense Refill   • clindamycin (CLEOCIN) 300 MG Cap Take 1 Cap by mouth 3 times a day for 7 days. 21 Cap 0   • tramadol (ULTRAM-ER) 100 MG tablet Take 100 mg by mouth 1 time daily as needed.  1   • Iron Tab Take 65 mg by mouth every day at 6 PM.     • doxycycline (MONODOX) 100 MG capsule      • ascorbic acid (ASCORBIC ACID) 500 MG Tab Take 500 mg by mouth every day.     • carvedilol (COREG) 6.25 MG Tab Take 1 Tab by mouth every day.     • furosemide (LASIX) 20 MG Tab Take 1 Tab by mouth every day.     • oxybutynin (DITROPAN) 5 MG Tab Take 1 Tab by mouth every day.     • apixaban (ELIQUIS) 2.5mg Tab Take 1 Tab by mouth 2 Times a Day. 180 Tab 3   • rosuvastatin (CRESTOR) 20 MG Tab TAKE 1 TABLET ORALLY DAILY EVERY EVENING 90 Tab 3   • lisinopril (PRINIVIL) 5 MG Tab Take 1 Tab by mouth every day. 90 Tab 4   • ropinirole (REQUIP) 1 MG Tab Take 1 Tab by mouth 3 times a day. 90 Tab 11   • tramadol (ULTRAM) 50 MG Tab Take 1-2 Tabs by mouth every four hours as needed. Indications: Moderate to Moderately Severe Pain     •  Cholecalciferol (VITAMIN D) 2000 UNITS Cap Take 1 Cap by mouth every day. 100 Cap 4   • latanoprost (XALATAN) 0.005 % SOLN Place 1 Drop in both eyes every evening.     • betaxolol (BETOPTIC-S) 0.25 % SUSP Place 1 Drop in both eyes every day.       No current facility-administered medications for this visit.     She  has a past medical history of CAD (coronary artery disease) (August 2013); Other malaise and fatigue; Reflux esophagitis; Arrest of bone development or growth; Symptomatic menopausal or female climacteric states; Disorder of bone and cartilage, unspecified; Disorders of bursae and tendons in shoulder region, unspecified; Degeneration of lumbar or lumbosacral intervertebral disc; Other extrapyramidal disease and abnormal movement disorder; Cellulitis and abscess of upper arm and forearm; Rotator cuff (capsule) sprain; Hypertonicity of bladder; Osteoporosis, unspecified; Anesthesia; Unspecified hemorrhagic conditions; Unspecified urinary incontinence; Other specified disorder of intestines; Pneumonia; Glaucoma; Arrhythmia; CATARACT; Hypertension; Abnormal EKG; Atrial fibrillation (CMS-HCC) (September 2014); Hyperlipidemia; Arthritis; MI (myocardial infarction) (CMS-HCC) ( ); CHF (congestive heart failure) (CMS-HCC) (5/11/2015); Hiatus hernia syndrome; Stroke (CMS-HCC) (August 2013); Heart burn; Pain; Backpain; Pain; High cholesterol; Coronary heart disease; Crohns disease (CMS-HCC); Rheumatoid arthritis (CMS-HCC); Restless leg syndrome; Chronic UTI (urinary tract infection) (3/30/2017); and Severe tricuspid regurgitation by prior echocardiogram (11/5/2016). She also has no past medical history of Diabetes, Psychiatric problem, Pacemaker, Heart murmur, ASTHMA, Jaundice, Personal history of venous thrombosis and embolism, Seizure (CMS-HCC), Angina, Rheumatic fever, Dialysis, Cancer (CMS-HCC), Unspecified disorder of thyroid, Bronchitis, COPD, Other specified symptom associated with female genital organs, or  Fall.    I personally reviewed patient's problem list, allergies, medications, family hx, social hx with patient and update EPIC.     REVIEW OF SYSTEMS:  CONSTITUTIONAL:  Denies night sweats, fatigue, malaise, lethargy, fever or chills.  RESPIRATORY:  Denies cough, wheeze, hemoptysis, or shortness of breath.  CARDIOVASCULAR:  Denies chest pains, palpitations, pedal edema  GASTROINTESTINAL:  Denies abdominal pain, nausea or vomiting, diarrhea, constipation, hematemesis, hematochezia, melena.  GENITOURINARY:  Denies urinary urgency, frequency, dysuria, or hematuria.        Objective:     Blood pressure 134/72, pulse 75, temperature 36.7 °C (98.1 °F), height 1.524 m (5'), weight 50.349 kg (111 lb), SpO2 95 %. Body mass index is 21.68 kg/(m^2).    Physical Exam:  Constitutional: awake, alert, in no distress.  Skin: Warm, dry, good turgor, no rashes, bruises, ulcers in visible areas.  - 2-cm laceration with mild-moderate surrounding edema and erythema. Lesion is mildly tender to palpation with mild yellowish discharge.   Eye: conjunctiva clear, lids neg for edema or lesions.  Respiratory: Unlabored respiratory effort, lungs clear to auscultation, no wheezes, no rhonchi.  Cardiovascular: Normal S1, S2, no murmur, no pedal edema.  Abdomen: Soft, non-tender to palpation, no hernia, no hepatosplenomegaly.  Neuro: CN2-12 grossly intact.    Psych: Oriented x3, affect and mood wnl, intact judgement and insight.       Assessment and Plan:   The following treatment plan was discussed    1. Cellulitis of right upper extremity  2. Visit for wound check  3. Medication intolerance  89 yo female who suffers acute laceration of the right forearm after falling into a recently trimmed shrub. She was prescribed Bactrim for possible wound infection, however, she develops severe GI distrubance with Bactrim. Exam noted for slow healing wound with mild-moderate erythema/edema and tenderness to palpation. Pt is afebrile, hemodynamically  stable, well appearing on exam, no s/s systemic infection. Plan:   - discontinued Bactrim due to side effects.   - clindamycin (CLEOCIN) 300 MG Cap; Take 1 Cap by mouth 3 times a day for 7 days.  Dispense: 21 Cap; Refill: 0  - side effects discussed with patient.   - follow up with PCP in 2 weeks.   - s/s of worsening infection discussed with patient, advised her to call if she has concerns.   - wound care instruction provided. Discussed nutritional supplement to advance wound healing.       Peace Ibrahim M.D.      Followup: Return if symptoms worsen or fail to improve.    Please note that this dictation was created using voice recognition software. I have made every reasonable attempt to correct obvious errors, but I expect that there are errors of grammar and possibly content that I did not discover before finalizing the note.

## 2017-08-24 NOTE — MR AVS SNAPSHOT
Barbara Zaragoza Trevor   2017 10:20 AM   Office Visit   MRN: [id number]    Department:  [address] [place] [address]   Dept Phone:  [phone]    Description:  Female : 8/10/1928   Provider:  Peace Ibrahim M.D.           Reason for Visit     Dressing Change on right arm s/p falling down 2 weeks ago      Allergies as of 2017     Allergen Noted Reactions    Pcn [Penicillins] 2010   Hives    Sulfa Drugs 2017   Nausea      You were diagnosed with     Cellulitis of right upper extremity   [740533]  -  Primary     Visit for wound check   [262665]       Medication intolerance   [215866]         Vital Signs     Blood Pressure Pulse Temperature Height Weight Body Mass Index    134/72 mmHg 75 36.7 °C (98.1 °F) 1.524 m (5') 50.349 kg (111 lb) 21.68 kg/m2    Oxygen Saturation Smoking Status                95% Former Smoker          Basic Information     Date Of Birth Sex Race Ethnicity Preferred Language    8/10/1928 Female White Non- English      Your appointments     Oct 11, 2017 10:00 AM   Established Patient with Mahendra Morrow M.D.   [address] [id number] [phone]           You will be receiving a confirmation call a few days before your appointment from our automated call confirmation system.              Problem List              ICD-10-CM Priority Class Noted - Resolved    Esophageal stricture- s/p dilation dr foley K22.2   2013 - Present    MI (myocardial infarction) (CMS-HCC)- 2013: Non STEMI. dr scruggs I21.3 High  2013 - Present    Glaucoma H40.9 Low  10/7/2013 - Present    Chronic atrial fibrillation- rx Eliquis; dr scruggs I48.2 Medium  2014 - Present    Chronic anticoagulation- Eliquis (for A.Fib)- dr scruggs Z79.01 Low  2015 - Present    Iron deficiency anemia D50.9 Medium  2015 - Present    Moderate to severe pulmonary hypertension (CMS-HCC) I27.2 High  2015 - Present    Coronary  artery disease involving native coronary artery of native heart without angina pectoris- non STEMI 2013; no stents or surgery; dr scruggs I25.10   10/6/2015 - Present    CVA, old, speech/language deficit- 2013 I69.328 Low  10/21/2015 - Present    Chronic pain syndrome-Dr. Capellan; tramadol rxd G89.4   4/20/2016 - Present    Urinary frequency R35.0   10/11/2016 - Present    Chronic systolic heart failure (CMS-[facility])- EF=50%, Nov., 2016 ECHO- dr scruggs I50.22 High  11/5/2016 - Present    CKD (chronic kidney disease) stage 3, GFR 30-59 ml/min N18.3 Low  11/5/2016 - Present    Mixed hyperlipidemia E78.2 Low  11/5/2016 - Present    Essential hypertension I10 Low  11/5/2016 - Present    Severe tricuspid regurgitation by prior echocardiogram I07.1 Low  11/5/2016 - Present    CAD (coronary artery disease) I25.10 Low  11/5/2016 - Present    RLS (restless legs syndrome) G25.81 Low  11/5/2016 - Present    Chronic UTI (urinary tract infection) N39.0   3/30/2017 - Present    Abnormal CT scan, chest- pma dr hoover R93.8   6/22/2017 - Present    Encounter for post-traumatic wound check Z51.89   8/17/2017 - Present    Cellulitis of forearm, right L03.113   8/17/2017 - Present      Health Maintenance        Date Due Completion Dates    IMM ZOSTER VACCINE 8/10/1988 ---    IMM INFLUENZA (1) 9/1/2017 10/28/2013, 10/1/2012    BONE DENSITY 7/8/2019 7/8/2014, 4/12/2011    IMM DTaP/Tdap/Td Vaccine (2 - Td) 8/9/2027 8/9/2017            Current Immunizations     13-VALENT PCV PREVNAR 12/7/2015, 10/21/2015    Influenza TIV (IM) 10/28/2013, 10/1/2012    Pneumococcal polysaccharide vaccine (PPSV-23) 8/1/2008    Tdap Vaccine 8/9/2017  6:32 PM      Below and/or attached are the medications your provider expects you to take. Review all of your home medications and newly ordered medications with your provider and/or pharmacist. Follow medication instructions as directed by your provider and/or pharmacist. Please keep your medication list with you and  share with your provider. Update the information when medications are discontinued, doses are changed, or new medications (including over-the-counter products) are added; and carry medication information at all times in the event of emergency situations     Allergies:  PCN - Hives     SULFA DRUGS - Nausea               Medications  Valid as of: August 24, 2017 - 12:55 PM    Generic Name Brand Name Tablet Size Instructions for use    Apixaban (Tab) ELIQUIS 2.5mg Take 1 Tab by mouth 2 Times a Day.        Ascorbic Acid (Tab) ascorbic acid 500 MG Take 500 mg by mouth every day.        Betaxolol HCl (Suspension) BETOPTIC-S 0.25 % Place 1 Drop in both eyes every day.        Carvedilol (Tab) COREG 6.25 MG Take 1 Tab by mouth every day.        Cholecalciferol (Cap) Vitamin D 2000 units Take 1 Cap by mouth every day.        Clindamycin HCl (Cap) CLEOCIN 300 MG Take 1 Cap by mouth 3 times a day for 7 days.        Doxycycline Monohydrate (Cap) MONODOX 100 MG         Furosemide (Tab) LASIX 20 MG Take 1 Tab by mouth every day.        Iron Combinations (Tab) Iron  Take 65 mg by mouth every day at 6 PM.        Latanoprost (Solution) XALATAN 0.005 % Place 1 Drop in both eyes every evening.        Lisinopril (Tab) PRINIVIL 5 MG Take 1 Tab by mouth every day.        Oxybutynin Chloride (Tab) DITROPAN 5 MG Take 1 Tab by mouth every day.        ROPINIRole HCl (Tab) REQUIP 1 MG Take 1 Tab by mouth 3 times a day.        Rosuvastatin Calcium (Tab) CRESTOR 20 MG TAKE 1 TABLET ORALLY DAILY EVERY EVENING        TraMADol HCl (Tab) ULTRAM 50 MG Take 1-2 Tabs by mouth every four hours as needed. Indications: Moderate to Moderately Severe Pain        TraMADol HCl (TABLET SR 24 HR) ULTRAM- MG Take 100 mg by mouth 1 time daily as needed.        .                 Medicines prescribed today were sent to:     [id number] - [place] - [address]    [address] [address]    Phone: [phone] Fax: [phone]     Open 24 Hours?: No      Medication refill instructions:       If your prescription bottle indicates you have medication refills left, it is not necessary to call your provider’s office. Please contact your pharmacy and they will refill your medication.    If your prescription bottle indicates you do not have any refills left, you may request refills at any time through one of the following ways: The online [facility] system (except Urgent Care), by calling your provider’s office, or by asking your pharmacy to contact your provider’s office with a refill request. Medication refills are processed only during regular business hours and may not be available until the next business day. Your provider may request additional information or to have a follow-up visit with you prior to refilling your medication.   *Please Note: Medication refills are assigned a new Rx number when refilled electronically. Your pharmacy may indicate that no refills were authorized even though a new prescription for the same medication is available at the pharmacy. Please request the medicine by name with the pharmacy before contacting your provider for a refill.           [facility] Access Code: Activation code not generated  Current [facility] Status: Active

## 2017-11-08 PROBLEM — E03.9 ACQUIRED HYPOTHYROIDISM: Status: ACTIVE | Noted: 2017-01-01

## 2017-11-08 NOTE — TELEPHONE ENCOUNTER
Please tell the patient she is slightly low on thyroid, and that she needs to start on thyroid replacement 25 µg a day. Ordered. We'll discuss further and office visit in 2 days.    Also, she is still iron deficient so she needs to increase her iron tablets to 2 pills a day (65 mg each)-ordered, with food and stool softener (Colace over-the-counter) to prevent constipation.    She also needs another stool test (FIT), to make sure she is not bleeding from her GI tract. Ordered.    She will also need to make a follow-up office visit with her cardiologist to continue to monitor her atrial fibrillation.

## 2017-11-09 NOTE — TELEPHONE ENCOUNTER
----- Message from aMhendra Morrow M.D. sent at 11/8/2017 12:51 PM PST -----  Please tell the patient she is slightly low on thyroid, and that she needs to start on thyroid replacement 25 µg a day. Ordered. We'll discuss further and office visit in 2 days.    Also, she is still iron deficient so she needs to increase her iron tablets to 2 pills a day (65 mg each)-ordered, with food and stool softener (Colace over-the-counter) to prevent constipation.    She also needs another stool test (FIT), to make sure she is not bleeding from her GI tract. Ordered.

## 2017-11-09 NOTE — TELEPHONE ENCOUNTER
Phone Number Called: [phone] (home) [phone] (work)    Message: called pt and notified caregiver of results.     Left Message for patient to call back: no

## 2017-11-09 NOTE — TELEPHONE ENCOUNTER
Phone Number Called: [phone] (home) [phone] (work)    Message: called pt left message for pt to call back.     Left Message for patient to call back: yes

## 2017-11-10 PROBLEM — N32.81 OAB (OVERACTIVE BLADDER): Status: ACTIVE | Noted: 2017-01-01

## 2017-11-10 PROBLEM — Z51.89 ENCOUNTER FOR POST-TRAUMATIC WOUND CHECK: Status: RESOLVED | Noted: 2017-08-17 | Resolved: 2017-01-01

## 2017-11-10 NOTE — PROGRESS NOTES
Subjective:      Barbara Murray is a 89 y.o. female who presents with Hypertension (evaluation on test results) and Medication Problem (pt not taking all medications would like to know if she needs to take all of them )  and   ,  The patient is here for followup of chronic medical problems listed below. The patient is compliant with medications and having no side effects from them. Denies chest pain, abdominal pain, dyspnea, myalgias, or cough.    Patient Active Problem List    Diagnosis Date Noted   • Chronic systolic heart failure (CMS-HCC)- EF=50%, Nov., 2016 ECHO- dr scruggs 11/05/2016     Priority: High   • Moderate to severe pulmonary hypertension 05/14/2015     Priority: High   • Old MI (myocardial infarction)- 8/2013, Non STEMI;  dr scruggs 08/14/2013     Priority: High   • Iron deficiency anemia due to chronic blood loss 05/12/2015     Priority: Medium   • Chronic atrial fibrillation- rx Eliquis; dr scruggs 04/29/2014     Priority: Medium   • CKD (chronic kidney disease) stage 3, GFR 30-59 ml/min 11/05/2016     Priority: Low   • Mixed hyperlipidemia 11/05/2016     Priority: Low   • Essential hypertension 11/05/2016     Priority: Low   • Severe tricuspid regurgitation by prior echocardiogram 11/05/2016     Priority: Low   • Ischemic heart disease due to coronary artery obstruction (CMS-HCC) - Non STEMI, 8/2013; med mgt; no intervention; dr scruggs,  11/05/2016     Priority: Low   • RLS (restless legs syndrome)- requip- d/c or take prn 11/05/2016     Priority: Low   • CVA, old, speech/language deficit- 2013 10/21/2015     Priority: Low   • Chronic anticoagulation- Eliquis (for A.Fib)- dr scruggs 04/30/2015     Priority: Low   • Glaucoma 10/07/2013     Priority: Low   • OAB (overactive bladder)- oxybutinin; d/c or take prn 11/10/2017   • Acquired hypothyroidism 11/08/2017   • Cellulitis of forearm, right- resolved; d/c cefdinir 08/17/2017   • Abnormal CT scan, chest- pma dr hoover 06/22/2017   • Chronic UTI  (urinary tract infection) 03/30/2017   • Chronic pain syndrome-Dr. Capellan; rx- tramadol & cymbalta   04/20/2016   • Esophageal stricture- s/p dilation dr foley 07/31/2013     Allergies   Allergen Reactions   • Pcn [Penicillins] Hives   • Sulfa Drugs Nausea          Outpatient Medications Prior to Visit   Medication Sig Dispense Refill   • carvedilol (COREG) 6.25 MG Tab Take 1 Tab by mouth every day.     • furosemide (LASIX) 20 MG Tab Take 1 Tab by mouth every day.     • oxybutynin (DITROPAN) 5 MG Tab Take 1 Tab by mouth every day.     • apixaban (ELIQUIS) 2.5mg Tab Take 1 Tab by mouth 2 Times a Day. 180 Tab 3   • lisinopril (PRINIVIL) 5 MG Tab Take 1 Tab by mouth every day. 90 Tab 4   • levothyroxine (SYNTHROID) 25 MCG Tab Take 1 Tab by mouth Every morning on an empty stomach. 90 Tab 4   • ferrous sulfate 325 (65 Fe) MG tablet Take 1 Tab by mouth every day. 180 Tab 1   • tramadol (ULTRAM-ER) 100 MG tablet Take 100 mg by mouth 1 time daily as needed.  1   • ascorbic acid (ASCORBIC ACID) 500 MG Tab Take 500 mg by mouth every day.     • Iron Tab Take 65 mg by mouth every day at 6 PM.     • doxycycline (MONODOX) 100 MG capsule      • rosuvastatin (CRESTOR) 20 MG Tab TAKE 1 TABLET ORALLY DAILY EVERY EVENING 90 Tab 3   • ropinirole (REQUIP) 1 MG Tab Take 1 Tab by mouth 3 times a day. 90 Tab 11   • tramadol (ULTRAM) 50 MG Tab Take 1-2 Tabs by mouth every four hours as needed. Indications: Moderate to Moderately Severe Pain     • Cholecalciferol (VITAMIN D) 2000 UNITS Cap Take 1 Cap by mouth every day. 100 Cap 4   • latanoprost (XALATAN) 0.005 % SOLN Place 1 Drop in both eyes every evening.     • betaxolol (BETOPTIC-S) 0.25 % SUSP Place 1 Drop in both eyes every day.       No facility-administered medications prior to visit.                   HPI    Review of Systems   Constitutional: Negative.    HENT: Negative.    Eyes: Negative.    Respiratory: Negative.    Cardiovascular: Negative.    Gastrointestinal: Negative.     Genitourinary: Negative.    Musculoskeletal: Negative.    Skin: Negative.    Neurological: Negative.    Endo/Heme/Allergies: Negative.    Psychiatric/Behavioral: Negative.           Objective:     /98   Pulse 91   Temp 37.1 °C (98.8 °F)   Ht 1.524 m (5')   Wt 51.3 kg (113 lb)   SpO2 94%   BMI 22.07 kg/m²      Physical Exam   Constitutional: She is oriented to person, place, and time. She appears well-developed and well-nourished. No distress.   HENT:   Head: Normocephalic and atraumatic.   Right Ear: External ear normal.   Left Ear: External ear normal.   Nose: Nose normal.   Mouth/Throat: Oropharynx is clear and moist. No oropharyngeal exudate.   Eyes: Conjunctivae and EOM are normal. Pupils are equal, round, and reactive to light. Right eye exhibits no discharge. Left eye exhibits no discharge. No scleral icterus.   Neck: Normal range of motion. Neck supple. No JVD present. No tracheal deviation present. No thyromegaly present.   Cardiovascular: Normal rate, regular rhythm, normal heart sounds and intact distal pulses.  Exam reveals no gallop and no friction rub.    No murmur heard.  Pulmonary/Chest: Effort normal and breath sounds normal. No stridor. No respiratory distress. She has no wheezes. She has no rales. She exhibits no tenderness.   Abdominal: Soft. Bowel sounds are normal. She exhibits no distension and no mass. There is no tenderness. There is no rebound and no guarding.   Musculoskeletal: Normal range of motion. She exhibits no edema or tenderness.   Lymphadenopathy:     She has no cervical adenopathy.   Neurological: She is alert and oriented to person, place, and time. She has normal reflexes. She displays normal reflexes. No cranial nerve deficit. She exhibits normal muscle tone. Coordination normal.   Skin: Skin is warm and dry. No rash noted. She is not diaphoretic. No erythema. No pallor.   Psychiatric: She has a normal mood and affect. Her behavior is normal. Judgment and thought  content normal.   Vitals reviewed.    Hospital Outpatient Visit on 11/07/2017   Component Date Value   • TSH 11/08/2017 6.120*   • Sodium 11/08/2017 138    • Potassium 11/08/2017 4.7    • Chloride 11/08/2017 106    • Co2 11/08/2017 24    • Anion Gap 11/08/2017 8.0    • Glucose 11/08/2017 89    • Bun 11/08/2017 28*   • Creatinine 11/08/2017 0.77    • Calcium 11/08/2017 9.0    • AST(SGOT) 11/08/2017 27    • ALT(SGPT) 11/08/2017 16    • Alkaline Phosphatase 11/08/2017 54    • Total Bilirubin 11/08/2017 0.9    • Albumin 11/08/2017 3.9    • Total Protein 11/08/2017 6.6    • Globulin 11/08/2017 2.7    • A-G Ratio 11/08/2017 1.4    • Cholesterol,Tot 11/08/2017 163    • Triglycerides 11/08/2017 62    • HDL 11/08/2017 60    • LDL 11/08/2017 91    • WBC 11/07/2017 6.2    • RBC 11/07/2017 4.25    • Hemoglobin 11/07/2017 10.8*   • Hematocrit 11/07/2017 35.5*   • MCV 11/07/2017 83.5    • MCH 11/07/2017 25.4*   • MCHC 11/07/2017 30.4*   • RDW 11/07/2017 45.1    • Platelet Count 11/07/2017 249    • MPV 11/07/2017 11.7    • Neutrophils-Polys 11/07/2017 63.00    • Lymphocytes 11/07/2017 24.80    • Monocytes 11/07/2017 8.50    • Eosinophils 11/07/2017 1.40    • Basophils 11/07/2017 1.80    • Immature Granulocytes 11/07/2017 0.50    • Nucleated RBC 11/07/2017 0.00    • Neutrophils (Absolute) 11/07/2017 3.93    • Lymphs (Absolute) 11/07/2017 1.55    • Monos (Absolute) 11/07/2017 0.53    • Eos (Absolute) 11/07/2017 0.09    • Baso (Absolute) 11/07/2017 0.11    • Immature Granulocytes (a* 11/07/2017 0.03    • NRBC (Absolute) 11/07/2017 0.00    • Iron 11/08/2017 39*   • Total Iron Binding 11/08/2017 504*   • % Saturation 11/08/2017 8*   • Ferritin 11/08/2017 16.4    • GFR If  11/08/2017 >60    • GFR If Non  Ameri* 11/08/2017 >60       Lab Results   Component Value Date/Time    HBA1C 6.1 (H) 10/10/2013 05:59 AM    HBA1C 4.9 04/21/2012 09:36 AM     Lab Results   Component Value Date/Time    SODIUM 138 11/07/2017 09:17  AM    POTASSIUM 4.7 11/07/2017 09:17 AM    CHLORIDE 106 11/07/2017 09:17 AM    CO2 24 11/07/2017 09:17 AM    GLUCOSE 89 11/07/2017 09:17 AM    BUN 28 (H) 11/07/2017 09:17 AM    CREATININE 0.77 11/07/2017 09:17 AM    CREATININE 1.01 (H) 05/03/2010 12:03 PM    BUNCREATRAT 23 07/10/2015 01:18 PM    BUNCREATRAT 24 05/03/2010 12:03 PM    GLOMRATE 53 (L) 05/03/2010 12:03 PM    ALKPHOSPHAT 54 11/07/2017 09:17 AM    ASTSGOT 27 11/07/2017 09:17 AM    ALTSGPT 16 11/07/2017 09:17 AM    TBILIRUBIN 0.9 11/07/2017 09:17 AM     Lab Results   Component Value Date/Time    INR 1.23 (H) 11/04/2016 09:50 AM    INR 1.34 (H) 05/11/2015 07:30 PM    INR 2.44 (H) 09/05/2014 02:42 PM     Lab Results   Component Value Date/Time    CHOLSTRLTOT 163 11/07/2017 09:17 AM    LDL 91 11/07/2017 09:17 AM    HDL 60 11/07/2017 09:17 AM    TRIGLYCERIDE 62 11/07/2017 09:17 AM       No results found for: TESTOSTERONE  Lab Results   Component Value Date/Time    TSH 2.410 05/03/2010 12:03 PM     Lab Results   Component Value Date/Time    FREET4 0.91 06/17/2016 09:08 AM    FREET4 0.89 12/10/2015 09:23 AM     No results found for: URICACID  No components found for: VITB12  Lab Results   Component Value Date/Time    25HYDROXY 31 11/04/2016 09:50 AM    25HYDROXY 19 (L) 06/17/2016 09:08 AM                 Assessment/Plan:     1. Chronic UTI (urinary tract infection)-  RECURRENCE THIS WK; resume macrobid bid x 2 wks, then qd for prevention; if urine culture resistant, rx different antibiotic       - URINE CULTURE(NEW); Future  - nitrofurantoin monohydr macro (MACROBID) 100 MG Cap; Take 1 Cap by mouth every day.  Dispense: 90 Cap; Refill: 4    2. Frequency of urination      - POCT Urinalysis- 4+ WBC- SEE ABOVE    3. Iron deficiency anemia due to chronic blood loss    PARTIALLY RXD- CONT IRON 65 MG QD X 3--6 MOS; TAKE VIT C FOR BETTER ABSORPTION      - OCCULT BLOOD FECES IMMUNOASSAY; Future  - CBC WITH DIFFERENTIAL; Future  - IRON/TOTAL IRON BIND; Future  -  FERRITIN; Future  - VITAMIN B12; Future  - FOLATE; Future  - ferrous sulfate 325 (65 Fe) MG tablet; Take 1 Tab by mouth every day.  Dispense: 90 Tab; Refill: 3    4. Chronic atrial fibrillation- rx Eliquis; dr scruggs   Under good control. Continue same regimen.\    5. Chronic systolic heart failure (CMS-HCC)- EF=50%, Nov., 2016 ECHO- dr scruggs         Under good control. Continue same regimen.\    6. CVA, old, speech/language deficit- 2013    Under good control. Continue same regimen.\    7. Chronic anticoagulation- Eliquis (for A.Fib)- dr scruggs    Under good control. Continue same regimen.\    8. Mixed hyperlipidemia    Under good control. Continue same regimen.\  - COMP METABOLIC PANEL; Future  - LIPID PROFILE; Future    9. CKD (chronic kidney disease) stage 3, GFR 30-59 ml/min    Under good control. Continue same regimen.\  10. Essential hypertension     Under good control. Continue same regimen.\  11. Acquired hypothyroidism- NEW PROB- RX 25 MG QD SYNTHROID     - TSH; Future    12. Old MI (myocardial infarction)- August 2013, Non STEMI.    Under good control. Continue same regimen.\  13. Moderate to severe pulmonary hypertension    Under good control. Continue same regimen.\  14. Severe tricuspid regurgitation by prior echocardiogram    Under good control. Continue same regimen.\    15. Chronic pain syndrome-Dr. Capellan; TRAMADOL AND CYMBALTA-     SHE WILL DISCUSS POSSIBLY D/C OF CYMBALTA DUE TO POLY- PHARMACY      -   16. Ischemic heart disease due to coronary artery obstruction (CMS-HCC) - Non STEMI, 8/2013; med mgt; no intervention; dr scruggs,      Under good control. Continue same regimen.\    17. Abnormal CT scan, chest- pma dr hoover     Under good control. Continue same regimen.\    18. Esophageal stricture- s/p dilation dr foley    Under good control. Continue same regimen.\    19. Screen for colon cancer     - OCCULT BLOOD FECES IMMUNOASSAY; Future    20. Cellulitis of forearm, right- resolved; d/c  cefdinir- REMOVED FROM PROB LIST       21. OAB (overactive bladder)- oxybutinin; d/c or take prn    I HAVE ENCOURAGED HER TO D/C DUE TO POLYPHARMACY    22. RLS (restless legs syndrome)- requip- d/c or take prn   WILL DISCUSS D/C DUE TO POLYPHARMACYWITH DR FERNANDEZ      40 minute face-to-face encounter took place today.  More than half of this time was spent in the coordination of care of the above problems, as well as counseling.

## 2017-11-10 NOTE — LETTER
November 10, 2017        Barbara Murray  [address] [place] [address]        Dear Barbara:       Current Outpatient Prescriptions   Medication Sig Dispense Refill   • nitrofurantoin monohydr macro (MACROBID) 100 MG Cap Take 100 mg by mouth 2 times a day.     • carvedilol (COREG) 6.25 MG Tab Take 1 Tab by mouth every day.     • furosemide (LASIX) 20 MG Tab Take 1 Tab by mouth every day.     • oxybutynin (DITROPAN) 5 MG Tab Take 1 Tab by mouth every day.     • apixaban (ELIQUIS) 2.5mg Tab Take 1 Tab by mouth 2 Times a Day. 180 Tab 3   • lisinopril (PRINIVIL) 5 MG Tab Take 1 Tab by mouth every day. 90 Tab 4   • ferrous sulfate 325 (65 Fe) MG tablet Take 1 Tab by mouth every day. 180 Tab 1   • levothyroxine (SYNTHROID) 25 MCG Tab Take 1 Tab by mouth Every morning on an empty stomach. 90 Tab 4   • tramadol (ULTRAM-ER) 100 MG tablet Take 100 mg by mouth 1 time daily as needed.  1   • Iron Tab Take 65 mg by mouth every day at 6 PM.     • ascorbic acid (ASCORBIC ACID) 500 MG Tab Take 500 mg by mouth every day.     • rosuvastatin (CRESTOR) 20 MG Tab TAKE 1 TABLET ORALLY DAILY EVERY EVENING 90 Tab 3   • ropinirole (REQUIP) 1 MG Tab Take 1 Tab by mouth 3 times a day. 90 Tab 11   • tramadol (ULTRAM) 50 MG Tab Take 1-2 Tabs by mouth every four hours as needed. Indications: Moderate to Moderately Severe Pain     • Cholecalciferol (VITAMIN D) 2000 UNITS Cap Take 1 Cap by mouth every day. 100 Cap 4   • latanoprost (XALATAN) 0.005 % SOLN Place 1 Drop in both eyes every evening.     • betaxolol (BETOPTIC-S) 0.25 % SUSP Place 1 Drop in both eyes every day.       No current facility-administered medications for this visit.          If you have any questions or concerns, please don't hesitate to call.        Sincerely,        Mahendra Morrow M.D.    Electronically Signed

## 2018-01-01 ENCOUNTER — HOME CARE VISIT (OUTPATIENT)
Dept: HOME HEALTH SERVICES | Facility: HOME HEALTHCARE | Age: 83
End: 2018-01-01
Payer: MEDICARE

## 2018-01-01 ENCOUNTER — TELEPHONE (OUTPATIENT)
Dept: CARDIOLOGY | Facility: MEDICAL CENTER | Age: 83
End: 2018-01-01

## 2018-01-01 ENCOUNTER — HOSPITAL ENCOUNTER (OUTPATIENT)
Dept: LAB | Facility: MEDICAL CENTER | Age: 83
End: 2018-04-24
Attending: INTERNAL MEDICINE
Payer: MEDICARE

## 2018-01-01 ENCOUNTER — OFFICE VISIT (OUTPATIENT)
Dept: MEDICAL GROUP | Age: 83
End: 2018-01-01
Payer: MEDICARE

## 2018-01-01 ENCOUNTER — HOSPITAL ENCOUNTER (EMERGENCY)
Facility: MEDICAL CENTER | Age: 83
End: 2018-03-15
Attending: EMERGENCY MEDICINE
Payer: MEDICARE

## 2018-01-01 ENCOUNTER — OFFICE VISIT (OUTPATIENT)
Dept: CARDIOLOGY | Facility: MEDICAL CENTER | Age: 83
End: 2018-01-01
Payer: MEDICARE

## 2018-01-01 ENCOUNTER — APPOINTMENT (OUTPATIENT)
Dept: RADIOLOGY | Facility: MEDICAL CENTER | Age: 83
DRG: 682 | End: 2018-01-01
Attending: HOSPITALIST
Payer: MEDICARE

## 2018-01-01 ENCOUNTER — PATIENT MESSAGE (OUTPATIENT)
Dept: HEALTH INFORMATION MANAGEMENT | Facility: OTHER | Age: 83
End: 2018-01-01

## 2018-01-01 ENCOUNTER — PATIENT OUTREACH (OUTPATIENT)
Dept: HEALTH INFORMATION MANAGEMENT | Facility: OTHER | Age: 83
End: 2018-01-01

## 2018-01-01 ENCOUNTER — APPOINTMENT (OUTPATIENT)
Dept: RADIOLOGY | Facility: MEDICAL CENTER | Age: 83
End: 2018-01-01
Attending: EMERGENCY MEDICINE
Payer: MEDICARE

## 2018-01-01 ENCOUNTER — APPOINTMENT (OUTPATIENT)
Dept: RADIOLOGY | Facility: MEDICAL CENTER | Age: 83
DRG: 603 | End: 2018-01-01
Attending: EMERGENCY MEDICINE
Payer: MEDICARE

## 2018-01-01 ENCOUNTER — HOME HEALTH ADMISSION (OUTPATIENT)
Dept: HOME HEALTH SERVICES | Facility: HOME HEALTHCARE | Age: 83
End: 2018-01-01
Payer: MEDICARE

## 2018-01-01 ENCOUNTER — TELEPHONE (OUTPATIENT)
Dept: HEALTH INFORMATION MANAGEMENT | Facility: OTHER | Age: 83
End: 2018-01-01

## 2018-01-01 ENCOUNTER — HOSPITAL ENCOUNTER (INPATIENT)
Facility: MEDICAL CENTER | Age: 83
LOS: 2 days | DRG: 293 | End: 2018-01-06
Attending: EMERGENCY MEDICINE | Admitting: HOSPITALIST
Payer: MEDICARE

## 2018-01-01 ENCOUNTER — RESOLUTE PROFESSIONAL BILLING HOSPITAL PROF FEE (OUTPATIENT)
Dept: HOSPITALIST | Facility: MEDICAL CENTER | Age: 83
End: 2018-01-01
Payer: MEDICARE

## 2018-01-01 ENCOUNTER — APPOINTMENT (OUTPATIENT)
Dept: RADIOLOGY | Facility: MEDICAL CENTER | Age: 83
DRG: 293 | End: 2018-01-01
Attending: EMERGENCY MEDICINE
Payer: MEDICARE

## 2018-01-01 ENCOUNTER — HOSPITAL ENCOUNTER (EMERGENCY)
Facility: MEDICAL CENTER | Age: 83
End: 2018-01-24
Attending: EMERGENCY MEDICINE
Payer: MEDICARE

## 2018-01-01 ENCOUNTER — TELEPHONE (OUTPATIENT)
Dept: MEDICAL GROUP | Age: 83
End: 2018-01-01

## 2018-01-01 ENCOUNTER — HOSPITAL ENCOUNTER (INPATIENT)
Facility: MEDICAL CENTER | Age: 83
LOS: 3 days | DRG: 603 | End: 2018-09-16
Attending: EMERGENCY MEDICINE | Admitting: HOSPITALIST
Payer: MEDICARE

## 2018-01-01 ENCOUNTER — HOSPITAL ENCOUNTER (INPATIENT)
Facility: MEDICAL CENTER | Age: 83
LOS: 1 days | DRG: 682 | End: 2018-10-02
Attending: EMERGENCY MEDICINE | Admitting: INTERNAL MEDICINE
Payer: MEDICARE

## 2018-01-01 ENCOUNTER — APPOINTMENT (OUTPATIENT)
Dept: RADIOLOGY | Facility: MEDICAL CENTER | Age: 83
DRG: 682 | End: 2018-01-01
Attending: EMERGENCY MEDICINE
Payer: MEDICARE

## 2018-01-01 ENCOUNTER — HOSPITAL ENCOUNTER (OUTPATIENT)
Dept: LAB | Facility: MEDICAL CENTER | Age: 83
End: 2018-06-19
Attending: INTERNAL MEDICINE
Payer: MEDICARE

## 2018-01-01 VITALS
SYSTOLIC BLOOD PRESSURE: 118 MMHG | OXYGEN SATURATION: 93 % | BODY MASS INDEX: 23.79 KG/M2 | DIASTOLIC BLOOD PRESSURE: 70 MMHG | WEIGHT: 118 LBS | HEART RATE: 89 BPM | HEIGHT: 59 IN | TEMPERATURE: 97.3 F | RESPIRATION RATE: 16 BRPM

## 2018-01-01 VITALS
HEIGHT: 60 IN | BODY MASS INDEX: 24.03 KG/M2 | SYSTOLIC BLOOD PRESSURE: 106 MMHG | WEIGHT: 122.4 LBS | RESPIRATION RATE: 20 BRPM | TEMPERATURE: 99.6 F | DIASTOLIC BLOOD PRESSURE: 72 MMHG | HEART RATE: 74 BPM | OXYGEN SATURATION: 92 %

## 2018-01-01 VITALS
SYSTOLIC BLOOD PRESSURE: 140 MMHG | RESPIRATION RATE: 20 BRPM | HEART RATE: 84 BPM | DIASTOLIC BLOOD PRESSURE: 82 MMHG | BODY MASS INDEX: 23.9 KG/M2 | OXYGEN SATURATION: 95 % | TEMPERATURE: 98.4 F | WEIGHT: 118.4 LBS

## 2018-01-01 VITALS
WEIGHT: 119.2 LBS | OXYGEN SATURATION: 97 % | SYSTOLIC BLOOD PRESSURE: 122 MMHG | TEMPERATURE: 98.7 F | BODY MASS INDEX: 24.06 KG/M2 | RESPIRATION RATE: 18 BRPM | DIASTOLIC BLOOD PRESSURE: 74 MMHG | HEART RATE: 72 BPM

## 2018-01-01 VITALS
RESPIRATION RATE: 20 BRPM | WEIGHT: 117.5 LBS | HEART RATE: 86 BPM | BODY MASS INDEX: 23.73 KG/M2 | SYSTOLIC BLOOD PRESSURE: 128 MMHG | DIASTOLIC BLOOD PRESSURE: 60 MMHG | TEMPERATURE: 98.9 F

## 2018-01-01 VITALS
WEIGHT: 115.3 LBS | OXYGEN SATURATION: 98 % | SYSTOLIC BLOOD PRESSURE: 122 MMHG | TEMPERATURE: 98.2 F | DIASTOLIC BLOOD PRESSURE: 60 MMHG | HEART RATE: 83 BPM | BODY MASS INDEX: 22.52 KG/M2 | RESPIRATION RATE: 18 BRPM

## 2018-01-01 VITALS
RESPIRATION RATE: 20 BRPM | SYSTOLIC BLOOD PRESSURE: 142 MMHG | OXYGEN SATURATION: 92 % | RESPIRATION RATE: 20 BRPM | DIASTOLIC BLOOD PRESSURE: 80 MMHG | OXYGEN SATURATION: 90 % | SYSTOLIC BLOOD PRESSURE: 124 MMHG | HEART RATE: 86 BPM | HEART RATE: 64 BPM | DIASTOLIC BLOOD PRESSURE: 86 MMHG | TEMPERATURE: 98.7 F | TEMPERATURE: 99.1 F

## 2018-01-01 VITALS
BODY MASS INDEX: 23.64 KG/M2 | SYSTOLIC BLOOD PRESSURE: 120 MMHG | WEIGHT: 117.1 LBS | HEART RATE: 78 BPM | RESPIRATION RATE: 20 BRPM | TEMPERATURE: 99.4 F | OXYGEN SATURATION: 98 % | DIASTOLIC BLOOD PRESSURE: 64 MMHG

## 2018-01-01 VITALS
HEART RATE: 85 BPM | OXYGEN SATURATION: 93 % | SYSTOLIC BLOOD PRESSURE: 106 MMHG | DIASTOLIC BLOOD PRESSURE: 50 MMHG | TEMPERATURE: 98.7 F | BODY MASS INDEX: 23.73 KG/M2 | WEIGHT: 117.5 LBS | RESPIRATION RATE: 18 BRPM

## 2018-01-01 VITALS
HEART RATE: 67 BPM | TEMPERATURE: 97.4 F | BODY MASS INDEX: 22.77 KG/M2 | DIASTOLIC BLOOD PRESSURE: 50 MMHG | OXYGEN SATURATION: 93 % | SYSTOLIC BLOOD PRESSURE: 99 MMHG | RESPIRATION RATE: 20 BRPM | HEIGHT: 60 IN | WEIGHT: 115.96 LBS

## 2018-01-01 VITALS
DIASTOLIC BLOOD PRESSURE: 60 MMHG | OXYGEN SATURATION: 99 % | HEART RATE: 72 BPM | TEMPERATURE: 99.4 F | RESPIRATION RATE: 18 BRPM | BODY MASS INDEX: 23.21 KG/M2 | SYSTOLIC BLOOD PRESSURE: 140 MMHG | WEIGHT: 118.2 LBS

## 2018-01-01 VITALS
OXYGEN SATURATION: 91 % | TEMPERATURE: 98.6 F | WEIGHT: 123 LBS | HEART RATE: 73 BPM | DIASTOLIC BLOOD PRESSURE: 72 MMHG | SYSTOLIC BLOOD PRESSURE: 122 MMHG | HEIGHT: 59 IN | BODY MASS INDEX: 24.8 KG/M2

## 2018-01-01 VITALS
HEART RATE: 74 BPM | BODY MASS INDEX: 22.46 KG/M2 | RESPIRATION RATE: 20 BRPM | OXYGEN SATURATION: 92 % | DIASTOLIC BLOOD PRESSURE: 72 MMHG | TEMPERATURE: 98.3 F | WEIGHT: 115 LBS | SYSTOLIC BLOOD PRESSURE: 128 MMHG

## 2018-01-01 VITALS
TEMPERATURE: 98.9 F | WEIGHT: 117.8 LBS | RESPIRATION RATE: 20 BRPM | BODY MASS INDEX: 23.78 KG/M2 | DIASTOLIC BLOOD PRESSURE: 62 MMHG | SYSTOLIC BLOOD PRESSURE: 130 MMHG | HEART RATE: 82 BPM

## 2018-01-01 VITALS
RESPIRATION RATE: 22 BRPM | HEART RATE: 83 BPM | SYSTOLIC BLOOD PRESSURE: 169 MMHG | HEIGHT: 60 IN | OXYGEN SATURATION: 99 % | WEIGHT: 120 LBS | BODY MASS INDEX: 23.56 KG/M2 | DIASTOLIC BLOOD PRESSURE: 105 MMHG | TEMPERATURE: 98.2 F

## 2018-01-01 VITALS
HEIGHT: 61 IN | TEMPERATURE: 98.2 F | HEART RATE: 74 BPM | SYSTOLIC BLOOD PRESSURE: 119 MMHG | BODY MASS INDEX: 23.52 KG/M2 | DIASTOLIC BLOOD PRESSURE: 71 MMHG | WEIGHT: 124.56 LBS | RESPIRATION RATE: 16 BRPM

## 2018-01-01 VITALS
SYSTOLIC BLOOD PRESSURE: 148 MMHG | DIASTOLIC BLOOD PRESSURE: 84 MMHG | OXYGEN SATURATION: 99 % | HEART RATE: 86 BPM | TEMPERATURE: 99.1 F | RESPIRATION RATE: 20 BRPM

## 2018-01-01 VITALS
RESPIRATION RATE: 18 BRPM | WEIGHT: 119.27 LBS | HEIGHT: 60 IN | OXYGEN SATURATION: 97 % | TEMPERATURE: 97.3 F | HEART RATE: 73 BPM | SYSTOLIC BLOOD PRESSURE: 126 MMHG | BODY MASS INDEX: 23.42 KG/M2 | DIASTOLIC BLOOD PRESSURE: 69 MMHG

## 2018-01-01 VITALS
DIASTOLIC BLOOD PRESSURE: 64 MMHG | RESPIRATION RATE: 20 BRPM | BODY MASS INDEX: 23.64 KG/M2 | HEART RATE: 53 BPM | TEMPERATURE: 99.4 F | WEIGHT: 117.1 LBS | SYSTOLIC BLOOD PRESSURE: 120 MMHG

## 2018-01-01 VITALS
OXYGEN SATURATION: 99 % | TEMPERATURE: 99.1 F | BODY MASS INDEX: 22.77 KG/M2 | WEIGHT: 116 LBS | DIASTOLIC BLOOD PRESSURE: 70 MMHG | RESPIRATION RATE: 18 BRPM | SYSTOLIC BLOOD PRESSURE: 136 MMHG

## 2018-01-01 VITALS
TEMPERATURE: 97.3 F | HEIGHT: 59 IN | WEIGHT: 123 LBS | SYSTOLIC BLOOD PRESSURE: 112 MMHG | DIASTOLIC BLOOD PRESSURE: 62 MMHG | BODY MASS INDEX: 24.8 KG/M2 | HEART RATE: 84 BPM | OXYGEN SATURATION: 91 %

## 2018-01-01 VITALS
RESPIRATION RATE: 18 BRPM | SYSTOLIC BLOOD PRESSURE: 118 MMHG | OXYGEN SATURATION: 98 % | BODY MASS INDEX: 22.72 KG/M2 | DIASTOLIC BLOOD PRESSURE: 68 MMHG | TEMPERATURE: 99.4 F | WEIGHT: 112.5 LBS | HEART RATE: 76 BPM

## 2018-01-01 VITALS
BODY MASS INDEX: 23.59 KG/M2 | OXYGEN SATURATION: 96 % | TEMPERATURE: 98.3 F | HEART RATE: 90 BPM | BODY MASS INDEX: 24.96 KG/M2 | WEIGHT: 123.8 LBS | HEIGHT: 59 IN | DIASTOLIC BLOOD PRESSURE: 68 MMHG | WEIGHT: 117 LBS | SYSTOLIC BLOOD PRESSURE: 102 MMHG | SYSTOLIC BLOOD PRESSURE: 122 MMHG | HEIGHT: 59 IN | HEART RATE: 82 BPM | TEMPERATURE: 97.7 F | DIASTOLIC BLOOD PRESSURE: 60 MMHG

## 2018-01-01 VITALS
OXYGEN SATURATION: 98 % | TEMPERATURE: 99.1 F | RESPIRATION RATE: 20 BRPM | DIASTOLIC BLOOD PRESSURE: 60 MMHG | HEART RATE: 76 BPM | SYSTOLIC BLOOD PRESSURE: 122 MMHG

## 2018-01-01 VITALS
HEIGHT: 60 IN | HEART RATE: 64 BPM | SYSTOLIC BLOOD PRESSURE: 118 MMHG | OXYGEN SATURATION: 90 % | DIASTOLIC BLOOD PRESSURE: 72 MMHG | WEIGHT: 113 LBS | TEMPERATURE: 99 F | BODY MASS INDEX: 22.19 KG/M2

## 2018-01-01 VITALS — HEART RATE: 76 BPM | TEMPERATURE: 99.4 F | RESPIRATION RATE: 18 BRPM | BODY MASS INDEX: 22.78 KG/M2 | WEIGHT: 112.8 LBS

## 2018-01-01 VITALS — HEART RATE: 86 BPM | DIASTOLIC BLOOD PRESSURE: 62 MMHG | SYSTOLIC BLOOD PRESSURE: 128 MMHG | HEIGHT: 59 IN

## 2018-01-01 VITALS
BODY MASS INDEX: 21.46 KG/M2 | DIASTOLIC BLOOD PRESSURE: 70 MMHG | TEMPERATURE: 98 F | OXYGEN SATURATION: 95 % | HEART RATE: 62 BPM | WEIGHT: 109.3 LBS | RESPIRATION RATE: 16 BRPM | SYSTOLIC BLOOD PRESSURE: 150 MMHG

## 2018-01-01 VITALS
TEMPERATURE: 98.4 F | SYSTOLIC BLOOD PRESSURE: 106 MMHG | HEART RATE: 76 BPM | RESPIRATION RATE: 20 BRPM | OXYGEN SATURATION: 93 % | DIASTOLIC BLOOD PRESSURE: 56 MMHG

## 2018-01-01 VITALS
SYSTOLIC BLOOD PRESSURE: 142 MMHG | DIASTOLIC BLOOD PRESSURE: 60 MMHG | BODY MASS INDEX: 22.78 KG/M2 | HEART RATE: 66 BPM | HEIGHT: 59 IN | WEIGHT: 113 LBS

## 2018-01-01 VITALS
HEART RATE: 72 BPM | BODY MASS INDEX: 20.6 KG/M2 | HEIGHT: 60 IN | OXYGEN SATURATION: 98 % | RESPIRATION RATE: 18 BRPM | TEMPERATURE: 98.8 F | DIASTOLIC BLOOD PRESSURE: 87 MMHG | SYSTOLIC BLOOD PRESSURE: 160 MMHG | WEIGHT: 104.94 LBS

## 2018-01-01 DIAGNOSIS — I25.9 ISCHEMIC HEART DISEASE DUE TO CORONARY ARTERY OBSTRUCTION (HCC): ICD-10-CM

## 2018-01-01 DIAGNOSIS — E78.2 MIXED HYPERLIPIDEMIA: ICD-10-CM

## 2018-01-01 DIAGNOSIS — R06.02 SOB (SHORTNESS OF BREATH): ICD-10-CM

## 2018-01-01 DIAGNOSIS — I50.33 ACUTE ON CHRONIC HEART FAILURE WITH NORMAL EJECTION FRACTION (HCC): ICD-10-CM

## 2018-01-01 DIAGNOSIS — I25.10 CORONARY ARTERY DISEASE DUE TO LIPID RICH PLAQUE: ICD-10-CM

## 2018-01-01 DIAGNOSIS — I35.0 MILD AORTIC STENOSIS: ICD-10-CM

## 2018-01-01 DIAGNOSIS — Z79.01 CHRONIC ANTICOAGULATION: ICD-10-CM

## 2018-01-01 DIAGNOSIS — I50.22 CHRONIC SYSTOLIC HEART FAILURE (HCC): ICD-10-CM

## 2018-01-01 DIAGNOSIS — N18.30 CKD (CHRONIC KIDNEY DISEASE) STAGE 3, GFR 30-59 ML/MIN (HCC): ICD-10-CM

## 2018-01-01 DIAGNOSIS — E03.9 ACQUIRED HYPOTHYROIDISM: ICD-10-CM

## 2018-01-01 DIAGNOSIS — D50.0 IRON DEFICIENCY ANEMIA DUE TO CHRONIC BLOOD LOSS: ICD-10-CM

## 2018-01-01 DIAGNOSIS — I48.20 CHRONIC ATRIAL FIBRILLATION (HCC): ICD-10-CM

## 2018-01-01 DIAGNOSIS — D50.8 IRON DEFICIENCY ANEMIA SECONDARY TO INADEQUATE DIETARY IRON INTAKE: ICD-10-CM

## 2018-01-01 DIAGNOSIS — I27.20 MODERATE TO SEVERE PULMONARY HYPERTENSION (HCC): ICD-10-CM

## 2018-01-01 DIAGNOSIS — G25.81 RLS (RESTLESS LEGS SYNDROME): ICD-10-CM

## 2018-01-01 DIAGNOSIS — I10 ESSENTIAL HYPERTENSION: ICD-10-CM

## 2018-01-01 DIAGNOSIS — L03.116 CELLULITIS OF LEFT LOWER EXTREMITY: ICD-10-CM

## 2018-01-01 DIAGNOSIS — R60.0 BILATERAL LEG EDEMA: ICD-10-CM

## 2018-01-01 DIAGNOSIS — I50.23 ACUTE ON CHRONIC SYSTOLIC CONGESTIVE HEART FAILURE (HCC): ICD-10-CM

## 2018-01-01 DIAGNOSIS — I50.812 CHRONIC RIGHT HEART FAILURE (HCC): ICD-10-CM

## 2018-01-01 DIAGNOSIS — R06.00 DYSPNEA, UNSPECIFIED TYPE: ICD-10-CM

## 2018-01-01 DIAGNOSIS — I24.0 ISCHEMIC HEART DISEASE DUE TO CORONARY ARTERY OBSTRUCTION (HCC): ICD-10-CM

## 2018-01-01 DIAGNOSIS — I35.1 MODERATE AORTIC REGURGITATION: ICD-10-CM

## 2018-01-01 DIAGNOSIS — I50.23 ACUTE ON CHRONIC SYSTOLIC HEART FAILURE (HCC): ICD-10-CM

## 2018-01-01 DIAGNOSIS — I25.2 OLD MI (MYOCARDIAL INFARCTION): ICD-10-CM

## 2018-01-01 DIAGNOSIS — R53.81 DEBILITY: ICD-10-CM

## 2018-01-01 DIAGNOSIS — Z79.01 CHRONIC ANTICOAGULATION: Primary | ICD-10-CM

## 2018-01-01 DIAGNOSIS — J96.22 ACUTE ON CHRONIC RESPIRATORY FAILURE WITH HYPOXIA AND HYPERCAPNIA (HCC): ICD-10-CM

## 2018-01-01 DIAGNOSIS — I50.21 ACUTE SYSTOLIC CONGESTIVE HEART FAILURE (HCC): ICD-10-CM

## 2018-01-01 DIAGNOSIS — J96.21 ACUTE ON CHRONIC RESPIRATORY FAILURE WITH HYPOXIA AND HYPERCAPNIA (HCC): ICD-10-CM

## 2018-01-01 DIAGNOSIS — N18.2 CKD (CHRONIC KIDNEY DISEASE), STAGE II: ICD-10-CM

## 2018-01-01 DIAGNOSIS — Z71.89 ADVANCE CARE PLANNING: ICD-10-CM

## 2018-01-01 DIAGNOSIS — I25.83 CORONARY ARTERY DISEASE DUE TO LIPID RICH PLAQUE: ICD-10-CM

## 2018-01-01 DIAGNOSIS — I50.22 CHRONIC SYSTOLIC CONGESTIVE HEART FAILURE (HCC): ICD-10-CM

## 2018-01-01 DIAGNOSIS — I69.328 CVA, OLD, SPEECH/LANGUAGE DEFICIT: ICD-10-CM

## 2018-01-01 DIAGNOSIS — I07.1 SEVERE TRICUSPID REGURGITATION BY PRIOR ECHOCARDIOGRAM: ICD-10-CM

## 2018-01-01 DIAGNOSIS — R01.1 HEART MURMUR: ICD-10-CM

## 2018-01-01 DIAGNOSIS — L03.115 CELLULITIS OF RIGHT LOWER EXTREMITY: ICD-10-CM

## 2018-01-01 DIAGNOSIS — R79.1 ELEVATED PARTIAL THROMBOPLASTIN TIME (PTT): ICD-10-CM

## 2018-01-01 DIAGNOSIS — I48.91 ATRIAL FIBRILLATION, UNSPECIFIED TYPE (HCC): ICD-10-CM

## 2018-01-01 DIAGNOSIS — I05.0 MODERATE MITRAL STENOSIS BY PRIOR ECHOCARDIOGRAM: ICD-10-CM

## 2018-01-01 DIAGNOSIS — E87.6 HYPOKALEMIA: ICD-10-CM

## 2018-01-01 LAB
ALBUMIN SERPL BCP-MCNC: 3 G/DL (ref 3.2–4.9)
ALBUMIN SERPL BCP-MCNC: 3.4 G/DL (ref 3.2–4.9)
ALBUMIN SERPL BCP-MCNC: 3.4 G/DL (ref 3.2–4.9)
ALBUMIN SERPL BCP-MCNC: 3.7 G/DL (ref 3.2–4.9)
ALBUMIN SERPL BCP-MCNC: 3.7 G/DL (ref 3.2–4.9)
ALBUMIN SERPL BCP-MCNC: 4 G/DL (ref 3.2–4.9)
ALBUMIN/GLOB SERPL: 0.9 G/DL
ALBUMIN/GLOB SERPL: 0.9 G/DL
ALBUMIN/GLOB SERPL: 1 G/DL
ALBUMIN/GLOB SERPL: 1.3 G/DL
ALP SERPL-CCNC: 60 U/L (ref 30–99)
ALP SERPL-CCNC: 61 U/L (ref 30–99)
ALP SERPL-CCNC: 62 U/L (ref 30–99)
ALP SERPL-CCNC: 62 U/L (ref 30–99)
ALP SERPL-CCNC: 67 U/L (ref 30–99)
ALP SERPL-CCNC: 68 U/L (ref 30–99)
ALT SERPL-CCNC: 13 U/L (ref 2–50)
ALT SERPL-CCNC: 14 U/L (ref 2–50)
ALT SERPL-CCNC: 14 U/L (ref 2–50)
ALT SERPL-CCNC: 15 U/L (ref 2–50)
ALT SERPL-CCNC: 16 U/L (ref 2–50)
ALT SERPL-CCNC: 41 U/L (ref 2–50)
ANION GAP SERPL CALC-SCNC: 1 MMOL/L (ref 0–11.9)
ANION GAP SERPL CALC-SCNC: 10 MMOL/L (ref 0–11.9)
ANION GAP SERPL CALC-SCNC: 11 MMOL/L (ref 0–11.9)
ANION GAP SERPL CALC-SCNC: 4 MMOL/L (ref 0–11.9)
ANION GAP SERPL CALC-SCNC: 4 MMOL/L (ref 0–11.9)
ANION GAP SERPL CALC-SCNC: 6 MMOL/L (ref 0–11.9)
ANION GAP SERPL CALC-SCNC: 7 MMOL/L (ref 0–11.9)
ANION GAP SERPL CALC-SCNC: 8 MMOL/L (ref 0–11.9)
ANION GAP SERPL CALC-SCNC: 9 MMOL/L (ref 0–11.9)
ANISOCYTOSIS BLD QL SMEAR: ABNORMAL
APPEARANCE UR: CLEAR
APPEARANCE UR: CLEAR
APTT PPP: 34.3 SEC (ref 24.7–36)
APTT PPP: 39.2 SEC (ref 24.7–36)
APTT PPP: >240 SEC (ref 24.7–36)
AST SERPL-CCNC: 26 U/L (ref 12–45)
AST SERPL-CCNC: 26 U/L (ref 12–45)
AST SERPL-CCNC: 27 U/L (ref 12–45)
AST SERPL-CCNC: 29 U/L (ref 12–45)
AST SERPL-CCNC: 31 U/L (ref 12–45)
AST SERPL-CCNC: 52 U/L (ref 12–45)
BACTERIA #/AREA URNS HPF: ABNORMAL /HPF
BACTERIA #/AREA URNS HPF: NEGATIVE /HPF
BACTERIA BLD CULT: NORMAL
BACTERIA BLD CULT: NORMAL
BASE EXCESS BLDA CALC-SCNC: -1 MMOL/L (ref -4–3)
BASOPHILS # BLD AUTO: 0.6 % (ref 0–1.8)
BASOPHILS # BLD AUTO: 0.7 % (ref 0–1.8)
BASOPHILS # BLD AUTO: 0.8 % (ref 0–1.8)
BASOPHILS # BLD AUTO: 0.9 % (ref 0–1.8)
BASOPHILS # BLD AUTO: 1 % (ref 0–1.8)
BASOPHILS # BLD AUTO: 1.3 % (ref 0–1.8)
BASOPHILS # BLD AUTO: 1.8 % (ref 0–1.8)
BASOPHILS # BLD: 0.05 K/UL (ref 0–0.12)
BASOPHILS # BLD: 0.06 K/UL (ref 0–0.12)
BASOPHILS # BLD: 0.07 K/UL (ref 0–0.12)
BASOPHILS # BLD: 0.08 K/UL (ref 0–0.12)
BASOPHILS # BLD: 0.09 K/UL (ref 0–0.12)
BASOPHILS # BLD: 0.09 K/UL (ref 0–0.12)
BILIRUB SERPL-MCNC: 0.8 MG/DL (ref 0.1–1.5)
BILIRUB SERPL-MCNC: 0.8 MG/DL (ref 0.1–1.5)
BILIRUB SERPL-MCNC: 0.9 MG/DL (ref 0.1–1.5)
BILIRUB SERPL-MCNC: 1 MG/DL (ref 0.1–1.5)
BILIRUB SERPL-MCNC: 1 MG/DL (ref 0.1–1.5)
BILIRUB SERPL-MCNC: 1.1 MG/DL (ref 0.1–1.5)
BILIRUB UR QL STRIP.AUTO: NEGATIVE
BILIRUB UR QL STRIP.AUTO: NEGATIVE
BNP SERPL-MCNC: 298 PG/ML (ref 0–100)
BNP SERPL-MCNC: 349 PG/ML (ref 0–100)
BNP SERPL-MCNC: 351 PG/ML (ref 0–100)
BNP SERPL-MCNC: 397 PG/ML (ref 0–100)
BNP SERPL-MCNC: 507 PG/ML (ref 0–100)
BODY TEMPERATURE: 36.6 CENTIGRADE
BUN SERPL-MCNC: 19 MG/DL (ref 8–22)
BUN SERPL-MCNC: 20 MG/DL (ref 8–22)
BUN SERPL-MCNC: 22 MG/DL (ref 8–22)
BUN SERPL-MCNC: 23 MG/DL (ref 8–22)
BUN SERPL-MCNC: 24 MG/DL (ref 8–22)
BUN SERPL-MCNC: 24 MG/DL (ref 8–22)
BUN SERPL-MCNC: 26 MG/DL (ref 8–22)
BUN SERPL-MCNC: 26 MG/DL (ref 8–22)
BUN SERPL-MCNC: 36 MG/DL (ref 8–22)
BUN SERPL-MCNC: 38 MG/DL (ref 8–22)
BUN SERPL-MCNC: 40 MG/DL (ref 8–22)
BUN SERPL-MCNC: 47 MG/DL (ref 8–22)
CALCIUM SERPL-MCNC: 8.1 MG/DL (ref 8.4–10.2)
CALCIUM SERPL-MCNC: 8.1 MG/DL (ref 8.4–10.2)
CALCIUM SERPL-MCNC: 8.3 MG/DL (ref 8.4–10.2)
CALCIUM SERPL-MCNC: 8.4 MG/DL (ref 8.4–10.2)
CALCIUM SERPL-MCNC: 8.5 MG/DL (ref 8.4–10.2)
CALCIUM SERPL-MCNC: 8.6 MG/DL (ref 8.4–10.2)
CALCIUM SERPL-MCNC: 8.6 MG/DL (ref 8.4–10.2)
CALCIUM SERPL-MCNC: 8.7 MG/DL (ref 8.4–10.2)
CALCIUM SERPL-MCNC: 8.9 MG/DL (ref 8.4–10.2)
CALCIUM SERPL-MCNC: 9.1 MG/DL (ref 8.5–10.5)
CALCIUM SERPL-MCNC: 9.4 MG/DL (ref 8.5–10.5)
CASTS 1761B: >10 /LPF
CASTS 1761B: ABNORMAL /LPF
CHLORIDE SERPL-SCNC: 100 MMOL/L (ref 96–112)
CHLORIDE SERPL-SCNC: 101 MMOL/L (ref 96–112)
CHLORIDE SERPL-SCNC: 103 MMOL/L (ref 96–112)
CHLORIDE SERPL-SCNC: 104 MMOL/L (ref 96–112)
CHLORIDE SERPL-SCNC: 96 MMOL/L (ref 96–112)
CHLORIDE SERPL-SCNC: 97 MMOL/L (ref 96–112)
CHLORIDE SERPL-SCNC: 98 MMOL/L (ref 96–112)
CHLORIDE SERPL-SCNC: 99 MMOL/L (ref 96–112)
CHOLEST SERPL-MCNC: 142 MG/DL (ref 100–199)
CO2 SERPL-SCNC: 25 MMOL/L (ref 20–33)
CO2 SERPL-SCNC: 27 MMOL/L (ref 20–33)
CO2 SERPL-SCNC: 28 MMOL/L (ref 20–33)
CO2 SERPL-SCNC: 29 MMOL/L (ref 20–33)
CO2 SERPL-SCNC: 30 MMOL/L (ref 20–33)
CO2 SERPL-SCNC: 30 MMOL/L (ref 20–33)
CO2 SERPL-SCNC: 31 MMOL/L (ref 20–33)
CO2 SERPL-SCNC: 33 MMOL/L (ref 20–33)
CO2 SERPL-SCNC: 34 MMOL/L (ref 20–33)
CO2 SERPL-SCNC: 37 MMOL/L (ref 20–33)
COLOR UR: YELLOW
COLOR UR: YELLOW
COMMENT 1642: NORMAL
CREAT SERPL-MCNC: 0.68 MG/DL (ref 0.5–1.4)
CREAT SERPL-MCNC: 0.81 MG/DL (ref 0.5–1.4)
CREAT SERPL-MCNC: 0.84 MG/DL (ref 0.5–1.4)
CREAT SERPL-MCNC: 0.85 MG/DL (ref 0.5–1.4)
CREAT SERPL-MCNC: 0.88 MG/DL (ref 0.5–1.4)
CREAT SERPL-MCNC: 0.92 MG/DL (ref 0.5–1.4)
CREAT SERPL-MCNC: 0.99 MG/DL (ref 0.5–1.4)
CREAT SERPL-MCNC: 1.05 MG/DL (ref 0.5–1.4)
CREAT SERPL-MCNC: 1.16 MG/DL (ref 0.5–1.4)
CREAT SERPL-MCNC: 1.29 MG/DL (ref 0.5–1.4)
CREAT SERPL-MCNC: 1.42 MG/DL (ref 0.5–1.4)
CREAT SERPL-MCNC: 1.86 MG/DL (ref 0.5–1.4)
CREAT SERPL-MCNC: 2.04 MG/DL (ref 0.5–1.4)
CREAT SERPL-MCNC: 2.09 MG/DL (ref 0.5–1.4)
CRYSTALS 1718B: ABNORMAL /HPF
EKG IMPRESSION: NORMAL
EOSINOPHIL # BLD AUTO: 0.03 K/UL (ref 0–0.51)
EOSINOPHIL # BLD AUTO: 0.04 K/UL (ref 0–0.51)
EOSINOPHIL # BLD AUTO: 0.05 K/UL (ref 0–0.51)
EOSINOPHIL # BLD AUTO: 0.06 K/UL (ref 0–0.51)
EOSINOPHIL # BLD AUTO: 0.07 K/UL (ref 0–0.51)
EOSINOPHIL # BLD AUTO: 0.08 K/UL (ref 0–0.51)
EOSINOPHIL # BLD AUTO: 0.08 K/UL (ref 0–0.51)
EOSINOPHIL # BLD AUTO: 0.12 K/UL (ref 0–0.51)
EOSINOPHIL # BLD AUTO: 0.19 K/UL (ref 0–0.51)
EOSINOPHIL # BLD AUTO: 0.22 K/UL (ref 0–0.51)
EOSINOPHIL NFR BLD: 0.5 % (ref 0–6.9)
EOSINOPHIL NFR BLD: 0.5 % (ref 0–6.9)
EOSINOPHIL NFR BLD: 0.6 % (ref 0–6.9)
EOSINOPHIL NFR BLD: 0.7 % (ref 0–6.9)
EOSINOPHIL NFR BLD: 0.7 % (ref 0–6.9)
EOSINOPHIL NFR BLD: 0.9 % (ref 0–6.9)
EOSINOPHIL NFR BLD: 0.9 % (ref 0–6.9)
EOSINOPHIL NFR BLD: 1.1 % (ref 0–6.9)
EOSINOPHIL NFR BLD: 1.4 % (ref 0–6.9)
EOSINOPHIL NFR BLD: 1.9 % (ref 0–6.9)
EOSINOPHIL NFR BLD: 2.8 % (ref 0–6.9)
EOSINOPHIL NFR BLD: 3.1 % (ref 0–6.9)
EPI CELLS #/AREA URNS HPF: ABNORMAL /HPF
EPI CELLS #/AREA URNS HPF: ABNORMAL /HPF
ERYTHROCYTE [DISTWIDTH] IN BLOOD BY AUTOMATED COUNT: 46 FL (ref 35.9–50)
ERYTHROCYTE [DISTWIDTH] IN BLOOD BY AUTOMATED COUNT: 46.2 FL (ref 35.9–50)
ERYTHROCYTE [DISTWIDTH] IN BLOOD BY AUTOMATED COUNT: 46.6 FL (ref 35.9–50)
ERYTHROCYTE [DISTWIDTH] IN BLOOD BY AUTOMATED COUNT: 47.2 FL (ref 35.9–50)
ERYTHROCYTE [DISTWIDTH] IN BLOOD BY AUTOMATED COUNT: 49 FL (ref 35.9–50)
ERYTHROCYTE [DISTWIDTH] IN BLOOD BY AUTOMATED COUNT: 50 FL (ref 35.9–50)
ERYTHROCYTE [DISTWIDTH] IN BLOOD BY AUTOMATED COUNT: 50.8 FL (ref 35.9–50)
ERYTHROCYTE [DISTWIDTH] IN BLOOD BY AUTOMATED COUNT: 51.4 FL (ref 35.9–50)
ERYTHROCYTE [DISTWIDTH] IN BLOOD BY AUTOMATED COUNT: 51.6 FL (ref 35.9–50)
ERYTHROCYTE [DISTWIDTH] IN BLOOD BY AUTOMATED COUNT: 53.2 FL (ref 35.9–50)
ERYTHROCYTE [DISTWIDTH] IN BLOOD BY AUTOMATED COUNT: 55 FL (ref 35.9–50)
ERYTHROCYTE [DISTWIDTH] IN BLOOD BY AUTOMATED COUNT: 58.3 FL (ref 35.9–50)
ERYTHROCYTE [DISTWIDTH] IN BLOOD BY AUTOMATED COUNT: 66.5 FL (ref 35.9–50)
FERRITIN SERPL-MCNC: 15 NG/ML (ref 10–291)
FERRITIN SERPL-MCNC: 27.5 NG/ML (ref 10–291)
FOLATE SERPL-MCNC: 20.7 NG/ML
GFR SERPL CREATININE-BSD FRML MDRD: >60 ML/MIN/1.73 M 2
GIANT PLATELETS BLD QL SMEAR: NORMAL
GLOBULIN SER CALC-MCNC: 3 G/DL (ref 1.9–3.5)
GLOBULIN SER CALC-MCNC: 3.3 G/DL (ref 1.9–3.5)
GLOBULIN SER CALC-MCNC: 3.3 G/DL (ref 1.9–3.5)
GLOBULIN SER CALC-MCNC: 3.7 G/DL (ref 1.9–3.5)
GLUCOSE BLD-MCNC: 140 MG/DL (ref 65–99)
GLUCOSE SERPL-MCNC: 101 MG/DL (ref 65–99)
GLUCOSE SERPL-MCNC: 105 MG/DL (ref 65–99)
GLUCOSE SERPL-MCNC: 105 MG/DL (ref 65–99)
GLUCOSE SERPL-MCNC: 106 MG/DL (ref 65–99)
GLUCOSE SERPL-MCNC: 107 MG/DL (ref 65–99)
GLUCOSE SERPL-MCNC: 108 MG/DL (ref 65–99)
GLUCOSE SERPL-MCNC: 108 MG/DL (ref 65–99)
GLUCOSE SERPL-MCNC: 119 MG/DL (ref 65–99)
GLUCOSE SERPL-MCNC: 121 MG/DL (ref 65–99)
GLUCOSE SERPL-MCNC: 123 MG/DL (ref 65–99)
GLUCOSE SERPL-MCNC: 132 MG/DL (ref 65–99)
GLUCOSE SERPL-MCNC: 89 MG/DL (ref 65–99)
GLUCOSE SERPL-MCNC: 98 MG/DL (ref 65–99)
GLUCOSE SERPL-MCNC: 99 MG/DL (ref 65–99)
GLUCOSE UR STRIP.AUTO-MCNC: NEGATIVE MG/DL
GLUCOSE UR STRIP.AUTO-MCNC: NEGATIVE MG/DL
HCO3 BLDA-SCNC: 30 MMOL/L (ref 17–25)
HCT VFR BLD AUTO: 26 % (ref 37–47)
HCT VFR BLD AUTO: 28 % (ref 37–47)
HCT VFR BLD AUTO: 29.4 % (ref 37–47)
HCT VFR BLD AUTO: 29.8 % (ref 37–47)
HCT VFR BLD AUTO: 30.9 % (ref 37–47)
HCT VFR BLD AUTO: 31.1 % (ref 37–47)
HCT VFR BLD AUTO: 33.6 % (ref 37–47)
HCT VFR BLD AUTO: 34.3 % (ref 37–47)
HCT VFR BLD AUTO: 36.2 % (ref 37–47)
HCT VFR BLD AUTO: 39 % (ref 37–47)
HCT VFR BLD AUTO: 39.3 % (ref 37–47)
HCT VFR BLD AUTO: 40.7 % (ref 37–47)
HCT VFR BLD AUTO: 42.9 % (ref 37–47)
HDLC SERPL-MCNC: 59 MG/DL
HGB BLD-MCNC: 10.3 G/DL (ref 12–16)
HGB BLD-MCNC: 10.6 G/DL (ref 12–16)
HGB BLD-MCNC: 10.9 G/DL (ref 12–16)
HGB BLD-MCNC: 11.6 G/DL (ref 12–16)
HGB BLD-MCNC: 11.7 G/DL (ref 12–16)
HGB BLD-MCNC: 11.8 G/DL (ref 12–16)
HGB BLD-MCNC: 7.5 G/DL (ref 12–16)
HGB BLD-MCNC: 8.6 G/DL (ref 12–16)
HGB BLD-MCNC: 8.6 G/DL (ref 12–16)
HGB BLD-MCNC: 8.9 G/DL (ref 12–16)
HGB BLD-MCNC: 9.2 G/DL (ref 12–16)
HGB BLD-MCNC: 9.5 G/DL (ref 12–16)
HGB BLD-MCNC: 9.6 G/DL (ref 12–16)
HYPOCHROMIA BLD QL SMEAR: ABNORMAL
IGA SERPL-MCNC: 320 MG/DL (ref 68–408)
IMM GRANULOCYTES # BLD AUTO: 0.02 K/UL (ref 0–0.11)
IMM GRANULOCYTES # BLD AUTO: 0.03 K/UL (ref 0–0.11)
IMM GRANULOCYTES # BLD AUTO: 0.06 K/UL (ref 0–0.11)
IMM GRANULOCYTES # BLD AUTO: 0.07 K/UL (ref 0–0.11)
IMM GRANULOCYTES # BLD AUTO: 0.12 K/UL (ref 0–0.11)
IMM GRANULOCYTES # BLD AUTO: 0.13 K/UL (ref 0–0.11)
IMM GRANULOCYTES NFR BLD AUTO: 0.3 % (ref 0–0.9)
IMM GRANULOCYTES NFR BLD AUTO: 0.4 % (ref 0–0.9)
IMM GRANULOCYTES NFR BLD AUTO: 0.8 % (ref 0–0.9)
IMM GRANULOCYTES NFR BLD AUTO: 0.9 % (ref 0–0.9)
IMM GRANULOCYTES NFR BLD AUTO: 1.4 % (ref 0–0.9)
IMM GRANULOCYTES NFR BLD AUTO: 1.4 % (ref 0–0.9)
INHALED O2 FLOW RATE: 7 L/MIN (ref 2–10)
INR PPP: 1.15 (ref 0.87–1.13)
INR PPP: 1.16 (ref 0.87–1.13)
INR PPP: 1.5 (ref 0.87–1.13)
IRON SATN MFR SERPL: 3 % (ref 15–55)
IRON SATN MFR SERPL: 6 % (ref 15–55)
IRON SERPL-MCNC: 17 UG/DL (ref 40–170)
IRON SERPL-MCNC: 34 UG/DL (ref 40–170)
KETONES UR STRIP.AUTO-MCNC: NEGATIVE MG/DL
KETONES UR STRIP.AUTO-MCNC: NEGATIVE MG/DL
LACTATE BLD-SCNC: 2.3 MMOL/L (ref 0.5–2)
LDLC SERPL CALC-MCNC: 72 MG/DL
LEUKOCYTE ESTERASE UR QL STRIP.AUTO: NEGATIVE
LEUKOCYTE ESTERASE UR QL STRIP.AUTO: NEGATIVE
LG PLATELETS BLD QL SMEAR: NORMAL
LG PLATELETS BLD QL SMEAR: NORMAL
LIPASE SERPL-CCNC: 34 U/L (ref 7–58)
LV EJECT FRACT  99904: 50
LV EJECT FRACT MOD 2C 99903: 73.15
LV EJECT FRACT MOD 4C 99902: 72.06
LV EJECT FRACT MOD BP 99901: 72.56
LYMPHOCYTES # BLD AUTO: 0.88 K/UL (ref 1–4.8)
LYMPHOCYTES # BLD AUTO: 0.9 K/UL (ref 1–4.8)
LYMPHOCYTES # BLD AUTO: 0.91 K/UL (ref 1–4.8)
LYMPHOCYTES # BLD AUTO: 1.01 K/UL (ref 1–4.8)
LYMPHOCYTES # BLD AUTO: 1.04 K/UL (ref 1–4.8)
LYMPHOCYTES # BLD AUTO: 1.16 K/UL (ref 1–4.8)
LYMPHOCYTES # BLD AUTO: 1.17 K/UL (ref 1–4.8)
LYMPHOCYTES # BLD AUTO: 1.19 K/UL (ref 1–4.8)
LYMPHOCYTES # BLD AUTO: 1.19 K/UL (ref 1–4.8)
LYMPHOCYTES # BLD AUTO: 1.21 K/UL (ref 1–4.8)
LYMPHOCYTES # BLD AUTO: 1.22 K/UL (ref 1–4.8)
LYMPHOCYTES # BLD AUTO: 1.27 K/UL (ref 1–4.8)
LYMPHOCYTES NFR BLD: 11.5 % (ref 22–41)
LYMPHOCYTES NFR BLD: 13.2 % (ref 22–41)
LYMPHOCYTES NFR BLD: 13.2 % (ref 22–41)
LYMPHOCYTES NFR BLD: 13.5 % (ref 22–41)
LYMPHOCYTES NFR BLD: 14.1 % (ref 22–41)
LYMPHOCYTES NFR BLD: 14.2 % (ref 22–41)
LYMPHOCYTES NFR BLD: 16.7 % (ref 22–41)
LYMPHOCYTES NFR BLD: 17.5 % (ref 22–41)
LYMPHOCYTES NFR BLD: 18.1 % (ref 22–41)
LYMPHOCYTES NFR BLD: 19.2 % (ref 22–41)
LYMPHOCYTES NFR BLD: 19.3 % (ref 22–41)
LYMPHOCYTES NFR BLD: 19.4 % (ref 22–41)
MACROCYTES BLD QL SMEAR: ABNORMAL
MAGNESIUM SERPL-MCNC: 2 MG/DL (ref 1.5–2.5)
MAGNESIUM SERPL-MCNC: 2.1 MG/DL (ref 1.5–2.5)
MANUAL DIFF BLD: NORMAL
MCH RBC QN AUTO: 20.8 PG (ref 27–33)
MCH RBC QN AUTO: 21.6 PG (ref 27–33)
MCH RBC QN AUTO: 21.9 PG (ref 27–33)
MCH RBC QN AUTO: 23 PG (ref 27–33)
MCH RBC QN AUTO: 23 PG (ref 27–33)
MCH RBC QN AUTO: 23.1 PG (ref 27–33)
MCH RBC QN AUTO: 23.2 PG (ref 27–33)
MCH RBC QN AUTO: 23.3 PG (ref 27–33)
MCH RBC QN AUTO: 23.4 PG (ref 27–33)
MCH RBC QN AUTO: 23.5 PG (ref 27–33)
MCH RBC QN AUTO: 23.7 PG (ref 27–33)
MCHC RBC AUTO-ENTMCNC: 27.5 G/DL (ref 33.6–35)
MCHC RBC AUTO-ENTMCNC: 27.7 G/DL (ref 33.6–35)
MCHC RBC AUTO-ENTMCNC: 28.3 G/DL (ref 33.6–35)
MCHC RBC AUTO-ENTMCNC: 28.5 G/DL (ref 33.6–35)
MCHC RBC AUTO-ENTMCNC: 28.8 G/DL (ref 33.6–35)
MCHC RBC AUTO-ENTMCNC: 28.9 G/DL (ref 33.6–35)
MCHC RBC AUTO-ENTMCNC: 29.3 G/DL (ref 33.6–35)
MCHC RBC AUTO-ENTMCNC: 29.8 G/DL (ref 33.6–35)
MCHC RBC AUTO-ENTMCNC: 30 G/DL (ref 33.6–35)
MCHC RBC AUTO-ENTMCNC: 30 G/DL (ref 33.6–35)
MCHC RBC AUTO-ENTMCNC: 30.3 G/DL (ref 33.6–35)
MCHC RBC AUTO-ENTMCNC: 30.7 G/DL (ref 33.6–35)
MCHC RBC AUTO-ENTMCNC: 30.9 G/DL (ref 33.6–35)
MCV RBC AUTO: 72.2 FL (ref 81.4–97.8)
MCV RBC AUTO: 74.7 FL (ref 81.4–97.8)
MCV RBC AUTO: 76 FL (ref 81.4–97.8)
MCV RBC AUTO: 76.1 FL (ref 81.4–97.8)
MCV RBC AUTO: 77 FL (ref 81.4–97.8)
MCV RBC AUTO: 77.6 FL (ref 81.4–97.8)
MCV RBC AUTO: 77.7 FL (ref 81.4–97.8)
MCV RBC AUTO: 78 FL (ref 81.4–97.8)
MCV RBC AUTO: 78.9 FL (ref 81.4–97.8)
MCV RBC AUTO: 78.9 FL (ref 81.4–97.8)
MCV RBC AUTO: 80.6 FL (ref 81.4–97.8)
MCV RBC AUTO: 82.9 FL (ref 81.4–97.8)
MCV RBC AUTO: 84.6 FL (ref 81.4–97.8)
MICRO URNS: ABNORMAL
MICRO URNS: ABNORMAL
MICROCYTES BLD QL SMEAR: ABNORMAL
MONOCYTES # BLD AUTO: 0.18 K/UL (ref 0–0.85)
MONOCYTES # BLD AUTO: 0.53 K/UL (ref 0–0.85)
MONOCYTES # BLD AUTO: 0.63 K/UL (ref 0–0.85)
MONOCYTES # BLD AUTO: 0.64 K/UL (ref 0–0.85)
MONOCYTES # BLD AUTO: 0.65 K/UL (ref 0–0.85)
MONOCYTES # BLD AUTO: 0.65 K/UL (ref 0–0.85)
MONOCYTES # BLD AUTO: 0.66 K/UL (ref 0–0.85)
MONOCYTES # BLD AUTO: 0.66 K/UL (ref 0–0.85)
MONOCYTES # BLD AUTO: 0.69 K/UL (ref 0–0.85)
MONOCYTES # BLD AUTO: 0.71 K/UL (ref 0–0.85)
MONOCYTES # BLD AUTO: 0.82 K/UL (ref 0–0.85)
MONOCYTES # BLD AUTO: 0.83 K/UL (ref 0–0.85)
MONOCYTES NFR BLD AUTO: 10 % (ref 0–13.4)
MONOCYTES NFR BLD AUTO: 10.6 % (ref 0–13.4)
MONOCYTES NFR BLD AUTO: 10.6 % (ref 0–13.4)
MONOCYTES NFR BLD AUTO: 11 % (ref 0–13.4)
MONOCYTES NFR BLD AUTO: 11.5 % (ref 0–13.4)
MONOCYTES NFR BLD AUTO: 3.5 % (ref 0–13.4)
MONOCYTES NFR BLD AUTO: 7.4 % (ref 0–13.4)
MONOCYTES NFR BLD AUTO: 7.4 % (ref 0–13.4)
MONOCYTES NFR BLD AUTO: 8.6 % (ref 0–13.4)
MONOCYTES NFR BLD AUTO: 9.1 % (ref 0–13.4)
MONOCYTES NFR BLD AUTO: 9.2 % (ref 0–13.4)
MONOCYTES NFR BLD AUTO: 9.4 % (ref 0–13.4)
MORPHOLOGY BLD-IMP: NORMAL
MORPHOLOGY BLD-IMP: NORMAL
NEUTROPHILS # BLD AUTO: 3.91 K/UL (ref 2–7.15)
NEUTROPHILS # BLD AUTO: 3.97 K/UL (ref 2–7.15)
NEUTROPHILS # BLD AUTO: 4.13 K/UL (ref 2–7.15)
NEUTROPHILS # BLD AUTO: 4.18 K/UL (ref 2–7.15)
NEUTROPHILS # BLD AUTO: 4.2 K/UL (ref 2–7.15)
NEUTROPHILS # BLD AUTO: 4.28 K/UL (ref 2–7.15)
NEUTROPHILS # BLD AUTO: 4.98 K/UL (ref 2–7.15)
NEUTROPHILS # BLD AUTO: 5.93 K/UL (ref 2–7.15)
NEUTROPHILS # BLD AUTO: 6.09 K/UL (ref 2–7.15)
NEUTROPHILS # BLD AUTO: 6.7 K/UL (ref 2–7.15)
NEUTROPHILS # BLD AUTO: 6.81 K/UL (ref 2–7.15)
NEUTROPHILS # BLD AUTO: 6.83 K/UL (ref 2–7.15)
NEUTROPHILS NFR BLD: 66.6 % (ref 44–72)
NEUTROPHILS NFR BLD: 67.8 % (ref 44–72)
NEUTROPHILS NFR BLD: 68.2 % (ref 44–72)
NEUTROPHILS NFR BLD: 68.5 % (ref 44–72)
NEUTROPHILS NFR BLD: 69.7 % (ref 44–72)
NEUTROPHILS NFR BLD: 73.9 % (ref 44–72)
NEUTROPHILS NFR BLD: 74.4 % (ref 44–72)
NEUTROPHILS NFR BLD: 74.5 % (ref 44–72)
NEUTROPHILS NFR BLD: 75.5 % (ref 44–72)
NEUTROPHILS NFR BLD: 76 % (ref 44–72)
NEUTROPHILS NFR BLD: 76.3 % (ref 44–72)
NEUTROPHILS NFR BLD: 76.9 % (ref 44–72)
NITRITE UR QL STRIP.AUTO: NEGATIVE
NITRITE UR QL STRIP.AUTO: NEGATIVE
NRBC # BLD AUTO: 0 K/UL
NRBC # BLD AUTO: 0.02 K/UL
NRBC BLD-RTO: 0 /100 WBC
NRBC BLD-RTO: 0.2 /100 WBC
OVALOCYTES BLD QL SMEAR: NORMAL
PCO2 BLDA: 92.1 MMHG (ref 26–37)
PCO2 TEMP ADJ BLDA: 90.5 MMHG (ref 26–37)
PH BLDA: 7.13 [PH] (ref 7.4–7.5)
PH TEMP ADJ BLDA: 7.14 [PH] (ref 7.4–7.5)
PH UR STRIP.AUTO: 6 [PH]
PH UR STRIP.AUTO: 6 [PH]
PLATELET # BLD AUTO: 213 K/UL (ref 164–446)
PLATELET # BLD AUTO: 216 K/UL (ref 164–446)
PLATELET # BLD AUTO: 216 K/UL (ref 164–446)
PLATELET # BLD AUTO: 238 K/UL (ref 164–446)
PLATELET # BLD AUTO: 239 K/UL (ref 164–446)
PLATELET # BLD AUTO: 239 K/UL (ref 164–446)
PLATELET # BLD AUTO: 240 K/UL (ref 164–446)
PLATELET # BLD AUTO: 258 K/UL (ref 164–446)
PLATELET # BLD AUTO: 265 K/UL (ref 164–446)
PLATELET # BLD AUTO: 267 K/UL (ref 164–446)
PLATELET # BLD AUTO: 269 K/UL (ref 164–446)
PLATELET # BLD AUTO: 287 K/UL (ref 164–446)
PLATELET # BLD AUTO: 289 K/UL (ref 164–446)
PLATELET BLD QL SMEAR: NORMAL
PMV BLD AUTO: 10.7 FL (ref 9–12.9)
PMV BLD AUTO: 10.7 FL (ref 9–12.9)
PMV BLD AUTO: 10.8 FL (ref 9–12.9)
PMV BLD AUTO: 10.8 FL (ref 9–12.9)
PMV BLD AUTO: 10.9 FL (ref 9–12.9)
PMV BLD AUTO: 11.1 FL (ref 9–12.9)
PMV BLD AUTO: 11.2 FL (ref 9–12.9)
PMV BLD AUTO: 11.3 FL (ref 9–12.9)
PMV BLD AUTO: 11.4 FL (ref 9–12.9)
PMV BLD AUTO: 11.7 FL (ref 9–12.9)
PO2 BLDA: 159.3 MMHG (ref 64–87)
PO2 TEMP ADJ BLDA: 157 MMHG (ref 64–87)
POIKILOCYTOSIS BLD QL SMEAR: NORMAL
POLYCHROMASIA BLD QL SMEAR: NORMAL
POTASSIUM SERPL-SCNC: 2.9 MMOL/L (ref 3.6–5.5)
POTASSIUM SERPL-SCNC: 3.5 MMOL/L (ref 3.6–5.5)
POTASSIUM SERPL-SCNC: 3.6 MMOL/L (ref 3.6–5.5)
POTASSIUM SERPL-SCNC: 3.8 MMOL/L (ref 3.6–5.5)
POTASSIUM SERPL-SCNC: 3.8 MMOL/L (ref 3.6–5.5)
POTASSIUM SERPL-SCNC: 3.9 MMOL/L (ref 3.6–5.5)
POTASSIUM SERPL-SCNC: 3.9 MMOL/L (ref 3.6–5.5)
POTASSIUM SERPL-SCNC: 4 MMOL/L (ref 3.6–5.5)
POTASSIUM SERPL-SCNC: 4 MMOL/L (ref 3.6–5.5)
POTASSIUM SERPL-SCNC: 4.1 MMOL/L (ref 3.6–5.5)
POTASSIUM SERPL-SCNC: 4.2 MMOL/L (ref 3.6–5.5)
POTASSIUM SERPL-SCNC: 4.4 MMOL/L (ref 3.6–5.5)
POTASSIUM SERPL-SCNC: 4.7 MMOL/L (ref 3.6–5.5)
POTASSIUM SERPL-SCNC: 4.7 MMOL/L (ref 3.6–5.5)
PROT SERPL-MCNC: 6.3 G/DL (ref 6–8.2)
PROT SERPL-MCNC: 6.7 G/DL (ref 6–8.2)
PROT SERPL-MCNC: 7 G/DL (ref 6–8.2)
PROT SERPL-MCNC: 7.1 G/DL (ref 6–8.2)
PROT SERPL-MCNC: 7.4 G/DL (ref 6–8.2)
PROT SERPL-MCNC: 7.4 G/DL (ref 6–8.2)
PROT UR QL STRIP: 100 MG/DL
PROT UR QL STRIP: 100 MG/DL
PROTHROMBIN TIME: 14.4 SEC (ref 12–14.6)
PROTHROMBIN TIME: 14.6 SEC (ref 12–14.6)
PROTHROMBIN TIME: 17.9 SEC (ref 12–14.6)
RBC # BLD AUTO: 3.6 M/UL (ref 4.2–5.4)
RBC # BLD AUTO: 3.68 M/UL (ref 4.2–5.4)
RBC # BLD AUTO: 3.82 M/UL (ref 4.2–5.4)
RBC # BLD AUTO: 3.96 M/UL (ref 4.2–5.4)
RBC # BLD AUTO: 3.99 M/UL (ref 4.2–5.4)
RBC # BLD AUTO: 4.09 M/UL (ref 4.2–5.4)
RBC # BLD AUTO: 4.33 M/UL (ref 4.2–5.4)
RBC # BLD AUTO: 4.35 M/UL (ref 4.2–5.4)
RBC # BLD AUTO: 4.59 M/UL (ref 4.2–5.4)
RBC # BLD AUTO: 4.74 M/UL (ref 4.2–5.4)
RBC # BLD AUTO: 5.02 M/UL (ref 4.2–5.4)
RBC # BLD AUTO: 5.05 M/UL (ref 4.2–5.4)
RBC # BLD AUTO: 5.07 M/UL (ref 4.2–5.4)
RBC # URNS HPF: ABNORMAL /HPF
RBC # URNS HPF: ABNORMAL /HPF
RBC BLD AUTO: PRESENT
RBC UR QL AUTO: ABNORMAL
RBC UR QL AUTO: ABNORMAL
SAO2 % BLDA: 98.9 % (ref 93–99)
SCHISTOCYTES BLD QL SMEAR: NORMAL
SIGNIFICANT IND 70042: NORMAL
SIGNIFICANT IND 70042: NORMAL
SITE SITE: NORMAL
SITE SITE: NORMAL
SODIUM SERPL-SCNC: 132 MMOL/L (ref 135–145)
SODIUM SERPL-SCNC: 132 MMOL/L (ref 135–145)
SODIUM SERPL-SCNC: 133 MMOL/L (ref 135–145)
SODIUM SERPL-SCNC: 134 MMOL/L (ref 135–145)
SODIUM SERPL-SCNC: 134 MMOL/L (ref 135–145)
SODIUM SERPL-SCNC: 135 MMOL/L (ref 135–145)
SODIUM SERPL-SCNC: 136 MMOL/L (ref 135–145)
SODIUM SERPL-SCNC: 138 MMOL/L (ref 135–145)
SODIUM SERPL-SCNC: 139 MMOL/L (ref 135–145)
SODIUM SERPL-SCNC: 142 MMOL/L (ref 135–145)
SOURCE SOURCE: NORMAL
SOURCE SOURCE: NORMAL
SP GR UR STRIP.AUTO: 1.02
SP GR UR STRIP.AUTO: 1.02
SPHEROCYTES BLD QL SMEAR: NORMAL
STOMATOCYTES BLD QL SMEAR: NORMAL
TARGETS BLD QL SMEAR: NORMAL
TIBC SERPL-MCNC: 524 UG/DL (ref 250–450)
TIBC SERPL-MCNC: 578 UG/DL (ref 250–450)
TRIGL SERPL-MCNC: 56 MG/DL (ref 0–149)
TROPONIN I SERPL-MCNC: 0.02 NG/ML (ref 0–0.04)
TROPONIN I SERPL-MCNC: 0.04 NG/ML (ref 0–0.04)
TROPONIN I SERPL-MCNC: <0.02 NG/ML (ref 0–0.04)
TROPONIN I SERPL-MCNC: <0.02 NG/ML (ref 0–0.04)
TSH SERPL DL<=0.005 MIU/L-ACNC: 3.46 UIU/ML (ref 0.38–5.33)
TSH SERPL DL<=0.005 MIU/L-ACNC: 4.16 UIU/ML (ref 0.38–5.33)
TTG IGA SER IA-ACNC: 1 U/ML (ref 0–3)
VANCOMYCIN TIMED 2584: 12.9 UG/ML
VIT B12 SERPL-MCNC: 604 PG/ML (ref 211–911)
WBC # BLD AUTO: 5.2 K/UL (ref 4.8–10.8)
WBC # BLD AUTO: 5.7 K/UL (ref 4.8–10.8)
WBC # BLD AUTO: 5.8 K/UL (ref 4.8–10.8)
WBC # BLD AUTO: 6 K/UL (ref 4.8–10.8)
WBC # BLD AUTO: 6.1 K/UL (ref 4.8–10.8)
WBC # BLD AUTO: 6.2 K/UL (ref 4.8–10.8)
WBC # BLD AUTO: 6.3 K/UL (ref 4.8–10.8)
WBC # BLD AUTO: 6.7 K/UL (ref 4.8–10.8)
WBC # BLD AUTO: 7.8 K/UL (ref 4.8–10.8)
WBC # BLD AUTO: 8.2 K/UL (ref 4.8–10.8)
WBC # BLD AUTO: 8.8 K/UL (ref 4.8–10.8)
WBC # BLD AUTO: 9 K/UL (ref 4.8–10.8)
WBC # BLD AUTO: 9 K/UL (ref 4.8–10.8)
WBC #/AREA URNS HPF: ABNORMAL /HPF
WBC #/AREA URNS HPF: ABNORMAL /HPF

## 2018-01-01 PROCEDURE — 665999 HH PPS REVENUE DEBIT

## 2018-01-01 PROCEDURE — G0162 HHC RN E&M PLAN SVS, 15 MIN: HCPCS

## 2018-01-01 PROCEDURE — 99233 SBSQ HOSP IP/OBS HIGH 50: CPT | Performed by: HOSPITALIST

## 2018-01-01 PROCEDURE — 700102 HCHG RX REV CODE 250 W/ 637 OVERRIDE(OP): Performed by: INTERNAL MEDICINE

## 2018-01-01 PROCEDURE — 99285 EMERGENCY DEPT VISIT HI MDM: CPT

## 2018-01-01 PROCEDURE — G8991 OTHER PT/OT GOAL STATUS: HCPCS

## 2018-01-01 PROCEDURE — 82728 ASSAY OF FERRITIN: CPT

## 2018-01-01 PROCEDURE — 85610 PROTHROMBIN TIME: CPT

## 2018-01-01 PROCEDURE — 770001 HCHG ROOM/CARE - MED/SURG/GYN PRIV*

## 2018-01-01 PROCEDURE — 700102 HCHG RX REV CODE 250 W/ 637 OVERRIDE(OP): Performed by: HOSPITALIST

## 2018-01-01 PROCEDURE — 92610 EVALUATE SWALLOWING FUNCTION: CPT

## 2018-01-01 PROCEDURE — 700105 HCHG RX REV CODE 258: Performed by: EMERGENCY MEDICINE

## 2018-01-01 PROCEDURE — 36415 COLL VENOUS BLD VENIPUNCTURE: CPT

## 2018-01-01 PROCEDURE — 700101 HCHG RX REV CODE 250: Performed by: HOSPITALIST

## 2018-01-01 PROCEDURE — 99232 SBSQ HOSP IP/OBS MODERATE 35: CPT | Performed by: HOSPITALIST

## 2018-01-01 PROCEDURE — 94640 AIRWAY INHALATION TREATMENT: CPT

## 2018-01-01 PROCEDURE — 665998 HH PPS REVENUE CREDIT

## 2018-01-01 PROCEDURE — G0495 RN CARE TRAIN/EDU IN HH: HCPCS

## 2018-01-01 PROCEDURE — G8997 SWALLOW GOAL STATUS: HCPCS | Mod: CI

## 2018-01-01 PROCEDURE — 81001 URINALYSIS AUTO W/SCOPE: CPT

## 2018-01-01 PROCEDURE — 92526 ORAL FUNCTION THERAPY: CPT

## 2018-01-01 PROCEDURE — 83880 ASSAY OF NATRIURETIC PEPTIDE: CPT

## 2018-01-01 PROCEDURE — 84484 ASSAY OF TROPONIN QUANT: CPT

## 2018-01-01 PROCEDURE — G8996 SWALLOW CURRENT STATUS: HCPCS | Mod: CI

## 2018-01-01 PROCEDURE — G0153 HHCP-SVS OF S/L PATH,EA 15MN: HCPCS

## 2018-01-01 PROCEDURE — 80048 BASIC METABOLIC PNL TOTAL CA: CPT

## 2018-01-01 PROCEDURE — G0493 RN CARE EA 15 MIN HH/HOSPICE: HCPCS

## 2018-01-01 PROCEDURE — 93005 ELECTROCARDIOGRAM TRACING: CPT | Performed by: EMERGENCY MEDICINE

## 2018-01-01 PROCEDURE — 304561 HCHG STAT O2

## 2018-01-01 PROCEDURE — 82803 BLOOD GASES ANY COMBINATION: CPT

## 2018-01-01 PROCEDURE — G0494 LPN CARE EA 15MIN HH/HOSPICE: HCPCS

## 2018-01-01 PROCEDURE — 94760 N-INVAS EAR/PLS OXIMETRY 1: CPT

## 2018-01-01 PROCEDURE — 85730 THROMBOPLASTIN TIME PARTIAL: CPT

## 2018-01-01 PROCEDURE — G0299 HHS/HOSPICE OF RN EA 15 MIN: HCPCS

## 2018-01-01 PROCEDURE — 700105 HCHG RX REV CODE 258: Performed by: HOSPITALIST

## 2018-01-01 PROCEDURE — 700111 HCHG RX REV CODE 636 W/ 250 OVERRIDE (IP): Performed by: HOSPITALIST

## 2018-01-01 PROCEDURE — A9270 NON-COVERED ITEM OR SERVICE: HCPCS | Performed by: HOSPITALIST

## 2018-01-01 PROCEDURE — 700101 HCHG RX REV CODE 250: Performed by: INTERNAL MEDICINE

## 2018-01-01 PROCEDURE — 99284 EMERGENCY DEPT VISIT MOD MDM: CPT

## 2018-01-01 PROCEDURE — 83735 ASSAY OF MAGNESIUM: CPT

## 2018-01-01 PROCEDURE — 700102 HCHG RX REV CODE 250 W/ 637 OVERRIDE(OP)

## 2018-01-01 PROCEDURE — 85025 COMPLETE CBC W/AUTO DIFF WBC: CPT

## 2018-01-01 PROCEDURE — 80202 ASSAY OF VANCOMYCIN: CPT

## 2018-01-01 PROCEDURE — 665001 SOC-HOME HEALTH

## 2018-01-01 PROCEDURE — 83690 ASSAY OF LIPASE: CPT

## 2018-01-01 PROCEDURE — 82746 ASSAY OF FOLIC ACID SERUM: CPT

## 2018-01-01 PROCEDURE — 71045 X-RAY EXAM CHEST 1 VIEW: CPT

## 2018-01-01 PROCEDURE — 770006 HCHG ROOM/CARE - MED/SURG/GYN SEMI*

## 2018-01-01 PROCEDURE — G8990 OTHER PT/OT CURRENT STATUS: HCPCS

## 2018-01-01 PROCEDURE — A9270 NON-COVERED ITEM OR SERVICE: HCPCS | Performed by: INTERNAL MEDICINE

## 2018-01-01 PROCEDURE — 770020 HCHG ROOM/CARE - TELE (206)

## 2018-01-01 PROCEDURE — 96366 THER/PROPH/DIAG IV INF ADDON: CPT

## 2018-01-01 PROCEDURE — 99226 PR SUBSEQUENT OBSERVATION CARE,LEVEL III: CPT | Performed by: HOSPITALIST

## 2018-01-01 PROCEDURE — 83605 ASSAY OF LACTIC ACID: CPT

## 2018-01-01 PROCEDURE — 80053 COMPREHEN METABOLIC PANEL: CPT

## 2018-01-01 PROCEDURE — 82607 VITAMIN B-12: CPT

## 2018-01-01 PROCEDURE — 82784 ASSAY IGA/IGD/IGG/IGM EACH: CPT

## 2018-01-01 PROCEDURE — G0180 MD CERTIFICATION HHA PATIENT: HCPCS | Performed by: INTERNAL MEDICINE

## 2018-01-01 PROCEDURE — 97162 PT EVAL MOD COMPLEX 30 MIN: CPT

## 2018-01-01 PROCEDURE — 96365 THER/PROPH/DIAG IV INF INIT: CPT

## 2018-01-01 PROCEDURE — G8987 SELF CARE CURRENT STATUS: HCPCS | Mod: CL

## 2018-01-01 PROCEDURE — 302112 WASHCLOTH,PERINEAL CARE: Performed by: HOSPITALIST

## 2018-01-01 PROCEDURE — 93306 TTE W/DOPPLER COMPLETE: CPT | Mod: 26 | Performed by: INTERNAL MEDICINE

## 2018-01-01 PROCEDURE — G8979 MOBILITY GOAL STATUS: HCPCS | Mod: CJ

## 2018-01-01 PROCEDURE — G8978 MOBILITY CURRENT STATUS: HCPCS | Mod: CJ

## 2018-01-01 PROCEDURE — 99239 HOSP IP/OBS DSCHRG MGMT >30: CPT | Performed by: HOSPITALIST

## 2018-01-01 PROCEDURE — 83540 ASSAY OF IRON: CPT

## 2018-01-01 PROCEDURE — 700101 HCHG RX REV CODE 250

## 2018-01-01 PROCEDURE — 99220 PR INITIAL OBSERVATION CARE,LEVL III: CPT | Performed by: INTERNAL MEDICINE

## 2018-01-01 PROCEDURE — G8988 SELF CARE GOAL STATUS: HCPCS | Mod: CK

## 2018-01-01 PROCEDURE — G0378 HOSPITAL OBSERVATION PER HR: HCPCS

## 2018-01-01 PROCEDURE — 85027 COMPLETE CBC AUTOMATED: CPT

## 2018-01-01 PROCEDURE — G8997 SWALLOW GOAL STATUS: HCPCS | Mod: CJ

## 2018-01-01 PROCEDURE — G8998 SWALLOW D/C STATUS: HCPCS | Mod: CL

## 2018-01-01 PROCEDURE — 99238 HOSP IP/OBS DSCHRG MGMT 30/<: CPT | Performed by: HOSPITALIST

## 2018-01-01 PROCEDURE — G8996 SWALLOW CURRENT STATUS: HCPCS | Mod: CL

## 2018-01-01 PROCEDURE — 97161 PT EVAL LOW COMPLEX 20 MIN: CPT

## 2018-01-01 PROCEDURE — 99215 OFFICE O/P EST HI 40 MIN: CPT | Performed by: INTERNAL MEDICINE

## 2018-01-01 PROCEDURE — 99214 OFFICE O/P EST MOD 30 MIN: CPT | Performed by: INTERNAL MEDICINE

## 2018-01-01 PROCEDURE — 87040 BLOOD CULTURE FOR BACTERIA: CPT | Mod: 91

## 2018-01-01 PROCEDURE — 76775 US EXAM ABDO BACK WALL LIM: CPT

## 2018-01-01 PROCEDURE — 99213 OFFICE O/P EST LOW 20 MIN: CPT | Performed by: FAMILY MEDICINE

## 2018-01-01 PROCEDURE — 93970 EXTREMITY STUDY: CPT

## 2018-01-01 PROCEDURE — 99214 OFFICE O/P EST MOD 30 MIN: CPT | Performed by: NURSE PRACTITIONER

## 2018-01-01 PROCEDURE — 97167 OT EVAL HIGH COMPLEX 60 MIN: CPT

## 2018-01-01 PROCEDURE — G8979 MOBILITY GOAL STATUS: HCPCS | Mod: CL

## 2018-01-01 PROCEDURE — G0300 HHS/HOSPICE OF LPN EA 15 MIN: HCPCS

## 2018-01-01 PROCEDURE — 83516 IMMUNOASSAY NONANTIBODY: CPT

## 2018-01-01 PROCEDURE — 36600 WITHDRAWAL OF ARTERIAL BLOOD: CPT

## 2018-01-01 PROCEDURE — 700111 HCHG RX REV CODE 636 W/ 250 OVERRIDE (IP): Performed by: EMERGENCY MEDICINE

## 2018-01-01 PROCEDURE — 83550 IRON BINDING TEST: CPT

## 2018-01-01 PROCEDURE — 84443 ASSAY THYROID STIM HORMONE: CPT

## 2018-01-01 PROCEDURE — 93925 LOWER EXTREMITY STUDY: CPT

## 2018-01-01 PROCEDURE — G8980 MOBILITY D/C STATUS: HCPCS | Mod: CJ

## 2018-01-01 PROCEDURE — G0155 HHCP-SVS OF CSW,EA 15 MIN: HCPCS

## 2018-01-01 PROCEDURE — 99223 1ST HOSP IP/OBS HIGH 75: CPT | Mod: AI | Performed by: HOSPITALIST

## 2018-01-01 PROCEDURE — 82962 GLUCOSE BLOOD TEST: CPT

## 2018-01-01 PROCEDURE — G8978 MOBILITY CURRENT STATUS: HCPCS | Mod: CM

## 2018-01-01 PROCEDURE — 80061 LIPID PANEL: CPT

## 2018-01-01 PROCEDURE — A9270 NON-COVERED ITEM OR SERVICE: HCPCS

## 2018-01-01 PROCEDURE — 96374 THER/PROPH/DIAG INJ IV PUSH: CPT

## 2018-01-01 PROCEDURE — 85007 BL SMEAR W/DIFF WBC COUNT: CPT

## 2018-01-01 PROCEDURE — 99220 PR INITIAL OBSERVATION CARE,LEVL III: CPT | Performed by: HOSPITALIST

## 2018-01-01 PROCEDURE — 93306 TTE W/DOPPLER COMPLETE: CPT

## 2018-01-01 RX ORDER — LISINOPRIL 5 MG/1
5 TABLET ORAL DAILY
Status: DISCONTINUED | OUTPATIENT
Start: 2018-01-01 | End: 2018-01-01

## 2018-01-01 RX ORDER — FUROSEMIDE 40 MG/1
40 TABLET ORAL DAILY
Qty: 60 TAB | Refills: 4 | Status: SHIPPED | OUTPATIENT
Start: 2018-01-01 | End: 2018-01-01 | Stop reason: SDUPTHER

## 2018-01-01 RX ORDER — TRAVOPROST OPHTHALMIC SOLUTION 0.04 MG/ML
1 SOLUTION OPHTHALMIC
Status: DISCONTINUED | OUTPATIENT
Start: 2018-01-01 | End: 2018-01-01

## 2018-01-01 RX ORDER — FUROSEMIDE 10 MG/ML
20 INJECTION INTRAMUSCULAR; INTRAVENOUS ONCE
Status: COMPLETED | OUTPATIENT
Start: 2018-01-01 | End: 2018-01-01

## 2018-01-01 RX ORDER — LATANOPROST 50 UG/ML
1 SOLUTION/ DROPS OPHTHALMIC NIGHTLY
Status: DISCONTINUED | OUTPATIENT
Start: 2018-01-01 | End: 2018-01-01 | Stop reason: HOSPADM

## 2018-01-01 RX ORDER — CARVEDILOL 6.25 MG/1
6.25 TABLET ORAL DAILY
Status: DISCONTINUED | OUTPATIENT
Start: 2018-01-01 | End: 2018-01-01 | Stop reason: HOSPADM

## 2018-01-01 RX ORDER — CLINDAMYCIN HYDROCHLORIDE 150 MG/1
150 CAPSULE ORAL EVERY 6 HOURS
Status: DISCONTINUED | OUTPATIENT
Start: 2018-01-01 | End: 2018-01-01 | Stop reason: HOSPADM

## 2018-01-01 RX ORDER — ONDANSETRON 2 MG/ML
4 INJECTION INTRAMUSCULAR; INTRAVENOUS EVERY 4 HOURS PRN
Status: DISCONTINUED | OUTPATIENT
Start: 2018-01-01 | End: 2018-01-01 | Stop reason: HOSPADM

## 2018-01-01 RX ORDER — POTASSIUM CHLORIDE 20 MEQ/1
20 TABLET, EXTENDED RELEASE ORAL 2 TIMES DAILY
Qty: 60 TAB | Refills: 11 | Status: SHIPPED | OUTPATIENT
Start: 2018-01-01

## 2018-01-01 RX ORDER — ROPINIROLE 1 MG/1
1 TABLET, FILM COATED ORAL 3 TIMES DAILY
Qty: 90 TAB | Refills: 11 | Status: SHIPPED | OUTPATIENT
Start: 2018-01-01 | End: 2018-01-01

## 2018-01-01 RX ORDER — FERROUS SULFATE 325(65) MG
325 TABLET ORAL 3 TIMES DAILY
Qty: 100 TAB | Refills: 3 | Status: SHIPPED | OUTPATIENT
Start: 2018-01-01

## 2018-01-01 RX ORDER — FUROSEMIDE 40 MG/1
40 TABLET ORAL 2 TIMES DAILY
Qty: 180 TAB | Refills: 4 | Status: SHIPPED | OUTPATIENT
Start: 2018-01-01

## 2018-01-01 RX ORDER — FUROSEMIDE 40 MG/1
40 TABLET ORAL 2 TIMES DAILY
Qty: 180 TAB | Refills: 4 | Status: SHIPPED | OUTPATIENT
Start: 2018-01-01 | End: 2018-01-01 | Stop reason: SDUPTHER

## 2018-01-01 RX ORDER — LISINOPRIL 5 MG/1
5 TABLET ORAL DAILY
Status: DISCONTINUED | OUTPATIENT
Start: 2018-01-01 | End: 2018-01-01 | Stop reason: HOSPADM

## 2018-01-01 RX ORDER — DULOXETIN HYDROCHLORIDE 20 MG/1
20 CAPSULE, DELAYED RELEASE ORAL DAILY
Status: DISCONTINUED | OUTPATIENT
Start: 2018-01-01 | End: 2018-01-01 | Stop reason: HOSPADM

## 2018-01-01 RX ORDER — LORAZEPAM 2 MG/ML
2 INJECTION INTRAMUSCULAR
Status: DISCONTINUED | OUTPATIENT
Start: 2018-01-01 | End: 2018-01-01 | Stop reason: HOSPADM

## 2018-01-01 RX ORDER — LISINOPRIL 5 MG/1
5 TABLET ORAL DAILY
Qty: 90 TAB | Refills: 4 | Status: SHIPPED | OUTPATIENT
Start: 2018-01-01 | End: 2018-01-01 | Stop reason: SDUPTHER

## 2018-01-01 RX ORDER — CARVEDILOL 6.25 MG/1
6.25 TABLET ORAL 2 TIMES DAILY WITH MEALS
Qty: 180 TAB | Refills: 3 | Status: SHIPPED | OUTPATIENT
Start: 2018-01-01

## 2018-01-01 RX ORDER — FUROSEMIDE 20 MG/1
20 TABLET ORAL DAILY
Qty: 90 TAB | Refills: 4 | Status: SHIPPED | OUTPATIENT
Start: 2018-01-01 | End: 2018-01-01

## 2018-01-01 RX ORDER — ATROPINE SULFATE 10 MG/ML
2 SOLUTION/ DROPS OPHTHALMIC EVERY 4 HOURS PRN
Status: DISCONTINUED | OUTPATIENT
Start: 2018-01-01 | End: 2018-01-01 | Stop reason: HOSPADM

## 2018-01-01 RX ORDER — CARVEDILOL 6.25 MG/1
6.25 TABLET ORAL 2 TIMES DAILY WITH MEALS
Status: DISCONTINUED | OUTPATIENT
Start: 2018-01-01 | End: 2018-01-01 | Stop reason: HOSPADM

## 2018-01-01 RX ORDER — FUROSEMIDE 10 MG/ML
20 INJECTION INTRAMUSCULAR; INTRAVENOUS
Status: DISCONTINUED | OUTPATIENT
Start: 2018-01-01 | End: 2018-01-01 | Stop reason: HOSPADM

## 2018-01-01 RX ORDER — CLINDAMYCIN HYDROCHLORIDE 150 MG/1
300 CAPSULE ORAL 4 TIMES DAILY
Qty: 40 CAP | Refills: 0 | Status: SHIPPED | OUTPATIENT
Start: 2018-01-01 | End: 2018-01-01

## 2018-01-01 RX ORDER — FUROSEMIDE 10 MG/ML
40 INJECTION INTRAMUSCULAR; INTRAVENOUS ONCE
Status: COMPLETED | OUTPATIENT
Start: 2018-01-01 | End: 2018-01-01

## 2018-01-01 RX ORDER — TRAMADOL HYDROCHLORIDE 50 MG/1
50 TABLET ORAL
Status: DISCONTINUED | OUTPATIENT
Start: 2018-01-01 | End: 2018-01-01

## 2018-01-01 RX ORDER — FUROSEMIDE 40 MG/1
40 TABLET ORAL 2 TIMES DAILY
Status: DISCONTINUED | OUTPATIENT
Start: 2018-01-01 | End: 2018-01-01 | Stop reason: HOSPADM

## 2018-01-01 RX ORDER — TIMOLOL MALEATE 5 MG/ML
1 SOLUTION/ DROPS OPHTHALMIC 2 TIMES DAILY
Status: DISCONTINUED | OUTPATIENT
Start: 2018-01-01 | End: 2018-01-01

## 2018-01-01 RX ORDER — FERROUS SULFATE 325(65) MG
325 TABLET ORAL 3 TIMES DAILY
Status: DISCONTINUED | OUTPATIENT
Start: 2018-01-01 | End: 2018-01-01 | Stop reason: HOSPADM

## 2018-01-01 RX ORDER — POTASSIUM CHLORIDE 20 MEQ/1
40 TABLET, EXTENDED RELEASE ORAL 3 TIMES DAILY
Status: DISPENSED | OUTPATIENT
Start: 2018-01-01 | End: 2018-01-01

## 2018-01-01 RX ORDER — ROPINIROLE 1 MG/1
1 TABLET, FILM COATED ORAL 3 TIMES DAILY
Status: DISCONTINUED | OUTPATIENT
Start: 2018-01-01 | End: 2018-01-01 | Stop reason: HOSPADM

## 2018-01-01 RX ORDER — AMOXICILLIN 250 MG
2 CAPSULE ORAL 2 TIMES DAILY
Status: DISCONTINUED | OUTPATIENT
Start: 2018-01-01 | End: 2018-01-01 | Stop reason: HOSPADM

## 2018-01-01 RX ORDER — TIMOLOL 5 MG/ML
1 SOLUTION/ DROPS OPHTHALMIC 2 TIMES DAILY
Status: DISCONTINUED | OUTPATIENT
Start: 2018-01-01 | End: 2018-01-01

## 2018-01-01 RX ORDER — ROSUVASTATIN CALCIUM 10 MG/1
20 TABLET, COATED ORAL EVERY EVENING
Status: DISCONTINUED | OUTPATIENT
Start: 2018-01-01 | End: 2018-01-01 | Stop reason: HOSPADM

## 2018-01-01 RX ORDER — CLINDAMYCIN HYDROCHLORIDE 150 MG/1
300 CAPSULE ORAL 4 TIMES DAILY
COMMUNITY
Start: 2018-01-01

## 2018-01-01 RX ORDER — POTASSIUM CHLORIDE 20 MEQ/1
20 TABLET, EXTENDED RELEASE ORAL 2 TIMES DAILY
Qty: 60 TAB | Refills: 11 | Status: SHIPPED | OUTPATIENT
Start: 2018-01-01 | End: 2018-01-01 | Stop reason: SDUPTHER

## 2018-01-01 RX ORDER — NITROFURANTOIN MACROCRYSTALS 100 MG/1
100 CAPSULE ORAL
Status: DISCONTINUED | OUTPATIENT
Start: 2018-01-01 | End: 2018-01-01 | Stop reason: HOSPADM

## 2018-01-01 RX ORDER — SODIUM CHLORIDE AND POTASSIUM CHLORIDE 150; 900 MG/100ML; MG/100ML
INJECTION, SOLUTION INTRAVENOUS ONCE
Status: COMPLETED | OUTPATIENT
Start: 2018-01-01 | End: 2018-01-01

## 2018-01-01 RX ORDER — DULOXETIN HYDROCHLORIDE 20 MG/1
20 CAPSULE, DELAYED RELEASE ORAL 2 TIMES DAILY
Status: SHIPPED | COMMUNITY
End: 2018-01-01

## 2018-01-01 RX ORDER — ROSUVASTATIN CALCIUM 20 MG/1
TABLET, COATED ORAL
Qty: 90 TAB | Refills: 3 | OUTPATIENT
Start: 2018-01-01 | End: 2018-01-01

## 2018-01-01 RX ORDER — OXYBUTYNIN CHLORIDE 5 MG/1
5 TABLET ORAL DAILY
Status: DISCONTINUED | OUTPATIENT
Start: 2018-01-01 | End: 2018-01-01 | Stop reason: HOSPADM

## 2018-01-01 RX ORDER — FERROUS SULFATE 325(65) MG
325 TABLET ORAL 3 TIMES DAILY
Qty: 100 TAB | Refills: 3 | Status: SHIPPED | OUTPATIENT
Start: 2018-01-01 | End: 2018-01-01 | Stop reason: SDUPTHER

## 2018-01-01 RX ORDER — LISINOPRIL 5 MG/1
5 TABLET ORAL DAILY
Qty: 90 TAB | Refills: 4 | Status: SHIPPED | OUTPATIENT
Start: 2018-01-01

## 2018-01-01 RX ORDER — ASPIRIN 325 MG
325 TABLET ORAL DAILY
Status: DISCONTINUED | OUTPATIENT
Start: 2018-01-01 | End: 2018-01-01

## 2018-01-01 RX ORDER — LATANOPROST 50 UG/ML
1 SOLUTION/ DROPS OPHTHALMIC EVERY EVENING
Status: DISCONTINUED | OUTPATIENT
Start: 2018-01-01 | End: 2018-01-01

## 2018-01-01 RX ORDER — LATANOPROST 50 UG/ML
1 SOLUTION/ DROPS OPHTHALMIC EVERY EVENING
Status: DISCONTINUED | OUTPATIENT
Start: 2018-01-01 | End: 2018-01-01 | Stop reason: HOSPADM

## 2018-01-01 RX ORDER — ONDANSETRON 4 MG/1
4 TABLET, ORALLY DISINTEGRATING ORAL EVERY 4 HOURS PRN
Status: DISCONTINUED | OUTPATIENT
Start: 2018-01-01 | End: 2018-01-01 | Stop reason: HOSPADM

## 2018-01-01 RX ORDER — SODIUM CHLORIDE 9 MG/ML
INJECTION, SOLUTION INTRAVENOUS CONTINUOUS
Status: ACTIVE | OUTPATIENT
Start: 2018-01-01 | End: 2018-01-01

## 2018-01-01 RX ORDER — ALBUTEROL SULFATE 90 UG/1
2 AEROSOL, METERED RESPIRATORY (INHALATION)
Status: DISCONTINUED | OUTPATIENT
Start: 2018-01-01 | End: 2018-01-01

## 2018-01-01 RX ORDER — POLYETHYLENE GLYCOL 3350 17 G/17G
1 POWDER, FOR SOLUTION ORAL
Status: DISCONTINUED | OUTPATIENT
Start: 2018-01-01 | End: 2018-01-01 | Stop reason: HOSPADM

## 2018-01-01 RX ORDER — DULOXETIN HYDROCHLORIDE 20 MG/1
20 CAPSULE, DELAYED RELEASE ORAL DAILY
Status: DISCONTINUED | OUTPATIENT
Start: 2018-01-01 | End: 2018-01-01

## 2018-01-01 RX ORDER — CARVEDILOL 6.25 MG/1
6.25 TABLET ORAL 2 TIMES DAILY WITH MEALS
Status: DISCONTINUED | OUTPATIENT
Start: 2018-01-01 | End: 2018-01-01

## 2018-01-01 RX ORDER — AMOXICILLIN 250 MG
2 CAPSULE ORAL 2 TIMES DAILY
Status: DISCONTINUED | OUTPATIENT
Start: 2018-01-01 | End: 2018-01-01

## 2018-01-01 RX ORDER — TAMSULOSIN HYDROCHLORIDE 0.4 MG/1
0.4 CAPSULE ORAL NIGHTLY
Status: DISCONTINUED | OUTPATIENT
Start: 2018-01-01 | End: 2018-01-01

## 2018-01-01 RX ORDER — ROPINIROLE 1 MG/1
1 TABLET, FILM COATED ORAL 2 TIMES DAILY
COMMUNITY

## 2018-01-01 RX ORDER — ROPINIROLE 1 MG/1
1 TABLET, FILM COATED ORAL
Status: DISCONTINUED | OUTPATIENT
Start: 2018-01-01 | End: 2018-01-01 | Stop reason: HOSPADM

## 2018-01-01 RX ORDER — MORPHINE SULFATE 4 MG/ML
4 INJECTION, SOLUTION INTRAMUSCULAR; INTRAVENOUS
Status: DISCONTINUED | OUTPATIENT
Start: 2018-01-01 | End: 2018-01-01 | Stop reason: HOSPADM

## 2018-01-01 RX ORDER — BISACODYL 10 MG
10 SUPPOSITORY, RECTAL RECTAL
Status: DISCONTINUED | OUTPATIENT
Start: 2018-01-01 | End: 2018-01-01 | Stop reason: HOSPADM

## 2018-01-01 RX ORDER — ROPINIROLE 1 MG/1
1 TABLET, FILM COATED ORAL 2 TIMES DAILY
Status: DISCONTINUED | OUTPATIENT
Start: 2018-01-01 | End: 2018-01-01

## 2018-01-01 RX ORDER — BISACODYL 10 MG
10 SUPPOSITORY, RECTAL RECTAL
Status: DISCONTINUED | OUTPATIENT
Start: 2018-01-01 | End: 2018-01-01

## 2018-01-01 RX ORDER — ACETAMINOPHEN 325 MG/1
650 TABLET ORAL EVERY 6 HOURS PRN
Status: DISCONTINUED | OUTPATIENT
Start: 2018-01-01 | End: 2018-01-01 | Stop reason: HOSPADM

## 2018-01-01 RX ORDER — LABETALOL HYDROCHLORIDE 5 MG/ML
10 INJECTION, SOLUTION INTRAVENOUS EVERY 4 HOURS PRN
Status: DISCONTINUED | OUTPATIENT
Start: 2018-01-01 | End: 2018-01-01 | Stop reason: HOSPADM

## 2018-01-01 RX ORDER — TIMOLOL 5 MG/ML
1 SOLUTION/ DROPS OPHTHALMIC 2 TIMES DAILY
Status: DISCONTINUED | OUTPATIENT
Start: 2018-01-01 | End: 2018-01-01 | Stop reason: HOSPADM

## 2018-01-01 RX ORDER — DULOXETIN HYDROCHLORIDE 20 MG/1
20 CAPSULE, DELAYED RELEASE ORAL ONCE
Status: DISPENSED | OUTPATIENT
Start: 2018-01-01 | End: 2018-01-01

## 2018-01-01 RX ORDER — FUROSEMIDE 40 MG/1
40 TABLET ORAL 2 TIMES DAILY
Qty: 60 TAB | Refills: 0 | Status: SHIPPED | OUTPATIENT
Start: 2018-01-01 | End: 2018-01-01

## 2018-01-01 RX ORDER — LATANOPROST 50 UG/ML
SOLUTION/ DROPS OPHTHALMIC
Status: COMPLETED
Start: 2018-01-01 | End: 2018-01-01

## 2018-01-01 RX ORDER — TRAMADOL HYDROCHLORIDE 50 MG/1
100 TABLET ORAL EVERY 6 HOURS PRN
Status: DISCONTINUED | OUTPATIENT
Start: 2018-01-01 | End: 2018-01-01 | Stop reason: HOSPADM

## 2018-01-01 RX ORDER — TRAVOPROST OPHTHALMIC SOLUTION 0.04 MG/ML
1 SOLUTION OPHTHALMIC 2 TIMES DAILY
Status: DISCONTINUED | OUTPATIENT
Start: 2018-01-01 | End: 2018-01-01

## 2018-01-01 RX ORDER — POLYETHYLENE GLYCOL 3350 17 G/17G
1 POWDER, FOR SOLUTION ORAL
Status: DISCONTINUED | OUTPATIENT
Start: 2018-01-01 | End: 2018-01-01

## 2018-01-01 RX ADMIN — POTASSIUM CHLORIDE 40 MEQ: 1500 TABLET, EXTENDED RELEASE ORAL at 18:19

## 2018-01-01 RX ADMIN — NITROFURANTOIN (MACROCRYSTALS) 100 MG: 100 CAPSULE ORAL at 20:40

## 2018-01-01 RX ADMIN — FUROSEMIDE 20 MG: 10 INJECTION, SOLUTION INTRAMUSCULAR; INTRAVENOUS at 11:25

## 2018-01-01 RX ADMIN — BETAXOLOL HYDROCHLORIDE 1 DROP: 2.8 SUSPENSION/ DROPS OPHTHALMIC at 09:00

## 2018-01-01 RX ADMIN — LISINOPRIL 5 MG: 5 TABLET ORAL at 06:43

## 2018-01-01 RX ADMIN — LATANOPROST 1 DROP: 50 SOLUTION OPHTHALMIC at 18:41

## 2018-01-01 RX ADMIN — APIXABAN 2.5 MG: 5 TABLET, FILM COATED ORAL at 08:25

## 2018-01-01 RX ADMIN — TIMOLOL MALEATE 1 DROP: 5 SOLUTION OPHTHALMIC at 18:00

## 2018-01-01 RX ADMIN — ROPINIROLE HYDROCHLORIDE 1 MG: 1 TABLET, FILM COATED ORAL at 08:25

## 2018-01-01 RX ADMIN — ROPINIROLE HYDROCHLORIDE 1 MG: 1 TABLET, FILM COATED ORAL at 15:55

## 2018-01-01 RX ADMIN — NITROFURANTOIN (MACROCRYSTALS) 100 MG: 100 CAPSULE ORAL at 21:22

## 2018-01-01 RX ADMIN — APIXABAN 2.5 MG: 5 TABLET, FILM COATED ORAL at 17:17

## 2018-01-01 RX ADMIN — APIXABAN 2.5 MG: 5 TABLET, FILM COATED ORAL at 05:21

## 2018-01-01 RX ADMIN — OXYBUTYNIN CHLORIDE 5 MG: 5 TABLET ORAL at 17:13

## 2018-01-01 RX ADMIN — FUROSEMIDE 20 MG: 10 INJECTION, SOLUTION INTRAMUSCULAR; INTRAVENOUS at 09:16

## 2018-01-01 RX ADMIN — ROPINIROLE HYDROCHLORIDE 1 MG: 1 TABLET, FILM COATED ORAL at 18:18

## 2018-01-01 RX ADMIN — LISINOPRIL 5 MG: 5 TABLET ORAL at 08:25

## 2018-01-01 RX ADMIN — ROPINIROLE HYDROCHLORIDE 1 MG: 1 TABLET, FILM COATED ORAL at 21:08

## 2018-01-01 RX ADMIN — LATANOPROST 1 DROP: 50 SOLUTION/ DROPS OPHTHALMIC at 21:24

## 2018-01-01 RX ADMIN — LATANOPROST 1 DROP: 50 SOLUTION/ DROPS OPHTHALMIC at 20:41

## 2018-01-01 RX ADMIN — MORPHINE SULFATE 4 MG: 4 INJECTION INTRAVENOUS at 08:46

## 2018-01-01 RX ADMIN — APIXABAN 2.5 MG: 5 TABLET, FILM COATED ORAL at 18:07

## 2018-01-01 RX ADMIN — LISINOPRIL 5 MG: 5 TABLET ORAL at 18:18

## 2018-01-01 RX ADMIN — LATANOPROST 1 DROP: 50 SOLUTION OPHTHALMIC at 23:09

## 2018-01-01 RX ADMIN — FUROSEMIDE 20 MG: 10 INJECTION, SOLUTION INTRAVENOUS at 08:46

## 2018-01-01 RX ADMIN — FERROUS SULFATE TAB 325 MG (65 MG ELEMENTAL FE) 325 MG: 325 (65 FE) TAB at 11:31

## 2018-01-01 RX ADMIN — ROPINIROLE HYDROCHLORIDE 1 MG: 1 TABLET, FILM COATED ORAL at 09:16

## 2018-01-01 RX ADMIN — FUROSEMIDE 40 MG: 40 TABLET ORAL at 17:17

## 2018-01-01 RX ADMIN — FUROSEMIDE 20 MG: 10 INJECTION, SOLUTION INTRAMUSCULAR; INTRAVENOUS at 17:13

## 2018-01-01 RX ADMIN — SENNOSIDES AND DOCUSATE SODIUM 1 TABLET: 8.6; 5 TABLET ORAL at 09:44

## 2018-01-01 RX ADMIN — TAMSULOSIN HYDROCHLORIDE 0.4 MG: 0.4 CAPSULE ORAL at 21:11

## 2018-01-01 RX ADMIN — ROPINIROLE HYDROCHLORIDE 1 MG: 1 TABLET, FILM COATED ORAL at 05:21

## 2018-01-01 RX ADMIN — FUROSEMIDE 40 MG: 40 TABLET ORAL at 21:11

## 2018-01-01 RX ADMIN — FERROUS SULFATE TAB 325 MG (65 MG ELEMENTAL FE) 325 MG: 325 (65 FE) TAB at 09:45

## 2018-01-01 RX ADMIN — ROPINIROLE HYDROCHLORIDE 1 MG: 1 TABLET, FILM COATED ORAL at 21:53

## 2018-01-01 RX ADMIN — ROPINIROLE HYDROCHLORIDE 1 MG: 1 TABLET, FILM COATED ORAL at 04:34

## 2018-01-01 RX ADMIN — OXYBUTYNIN CHLORIDE 5 MG: 5 TABLET ORAL at 08:25

## 2018-01-01 RX ADMIN — ROPINIROLE HYDROCHLORIDE 1 MG: 1 TABLET, FILM COATED ORAL at 15:02

## 2018-01-01 RX ADMIN — APIXABAN 2.5 MG: 5 TABLET, FILM COATED ORAL at 05:46

## 2018-01-01 RX ADMIN — VANCOMYCIN HYDROCHLORIDE 1400 MG: 5 INJECTION, POWDER, LYOPHILIZED, FOR SOLUTION INTRAVENOUS at 16:15

## 2018-01-01 RX ADMIN — LATANOPROST 1 DROP: 50 SOLUTION OPHTHALMIC at 17:17

## 2018-01-01 RX ADMIN — LISINOPRIL 5 MG: 5 TABLET ORAL at 17:13

## 2018-01-01 RX ADMIN — TIMOLOL MALEATE 1 DROP: 5 SOLUTION OPHTHALMIC at 04:42

## 2018-01-01 RX ADMIN — CARVEDILOL 6.25 MG: 6.25 TABLET, FILM COATED ORAL at 21:14

## 2018-01-01 RX ADMIN — FERROUS SULFATE TAB 325 MG (65 MG ELEMENTAL FE) 325 MG: 325 (65 FE) TAB at 20:38

## 2018-01-01 RX ADMIN — ALBUTEROL SULFATE 2.5 MG: 2.5 SOLUTION RESPIRATORY (INHALATION) at 19:18

## 2018-01-01 RX ADMIN — FUROSEMIDE 40 MG: 40 TABLET ORAL at 06:43

## 2018-01-01 RX ADMIN — ROPINIROLE HYDROCHLORIDE 1 MG: 1 TABLET, FILM COATED ORAL at 21:22

## 2018-01-01 RX ADMIN — FUROSEMIDE 40 MG: 40 TABLET ORAL at 05:46

## 2018-01-01 RX ADMIN — OXYBUTYNIN CHLORIDE 5 MG: 5 TABLET ORAL at 11:23

## 2018-01-01 RX ADMIN — FUROSEMIDE 20 MG: 10 INJECTION, SOLUTION INTRAMUSCULAR; INTRAVENOUS at 08:24

## 2018-01-01 RX ADMIN — ROPINIROLE HYDROCHLORIDE 1 MG: 1 TABLET, FILM COATED ORAL at 20:38

## 2018-01-01 RX ADMIN — ROSUVASTATIN CALCIUM 20 MG: 10 TABLET, FILM COATED ORAL at 21:22

## 2018-01-01 RX ADMIN — DULOXETINE HYDROCHLORIDE 20 MG: 20 CAPSULE, DELAYED RELEASE ORAL at 09:16

## 2018-01-01 RX ADMIN — ROPINIROLE HYDROCHLORIDE 1 MG: 1 TABLET, FILM COATED ORAL at 11:23

## 2018-01-01 RX ADMIN — LISINOPRIL 5 MG: 5 TABLET ORAL at 09:16

## 2018-01-01 RX ADMIN — SODIUM CHLORIDE: 9 INJECTION, SOLUTION INTRAVENOUS at 09:14

## 2018-01-01 RX ADMIN — BETAXOLOL HYDROCHLORIDE 1 DROP: 2.8 SUSPENSION/ DROPS OPHTHALMIC at 09:16

## 2018-01-01 RX ADMIN — DULOXETINE HYDROCHLORIDE 20 MG: 20 CAPSULE, DELAYED RELEASE ORAL at 15:03

## 2018-01-01 RX ADMIN — POTASSIUM CHLORIDE 40 MEQ: 1500 TABLET, EXTENDED RELEASE ORAL at 12:18

## 2018-01-01 RX ADMIN — FERROUS SULFATE TAB 325 MG (65 MG ELEMENTAL FE) 325 MG: 325 (65 FE) TAB at 06:46

## 2018-01-01 RX ADMIN — APIXABAN 2.5 MG: 5 TABLET, FILM COATED ORAL at 21:09

## 2018-01-01 RX ADMIN — TIMOLOL MALEATE 1 DROP: 5 SOLUTION OPHTHALMIC at 06:51

## 2018-01-01 RX ADMIN — APIXABAN 2.5 MG: 5 TABLET, FILM COATED ORAL at 04:34

## 2018-01-01 RX ADMIN — NITROFURANTOIN (MACROCRYSTALS) 100 MG: 100 CAPSULE ORAL at 21:53

## 2018-01-01 RX ADMIN — CARVEDILOL 6.25 MG: 6.25 TABLET, FILM COATED ORAL at 18:18

## 2018-01-01 RX ADMIN — DULOXETINE HYDROCHLORIDE 20 MG: 20 CAPSULE, DELAYED RELEASE ORAL at 17:13

## 2018-01-01 RX ADMIN — VANCOMYCIN HYDROCHLORIDE 800 MG: 5 INJECTION, POWDER, LYOPHILIZED, FOR SOLUTION INTRAVENOUS at 17:17

## 2018-01-01 RX ADMIN — ROPINIROLE HYDROCHLORIDE 1 MG: 1 TABLET, FILM COATED ORAL at 20:40

## 2018-01-01 RX ADMIN — APIXABAN 2.5 MG: 5 TABLET, FILM COATED ORAL at 21:53

## 2018-01-01 RX ADMIN — APIXABAN 2.5 MG: 5 TABLET, FILM COATED ORAL at 20:39

## 2018-01-01 RX ADMIN — TIMOLOL MALEATE 1 DROP: 5 SOLUTION OPHTHALMIC at 06:00

## 2018-01-01 RX ADMIN — CARVEDILOL 6.25 MG: 6.25 TABLET, FILM COATED ORAL at 09:16

## 2018-01-01 RX ADMIN — TIMOLOL MALEATE 1 DROP: 5 SOLUTION OPHTHALMIC at 05:29

## 2018-01-01 RX ADMIN — ROPINIROLE HYDROCHLORIDE 1 MG: 1 TABLET, FILM COATED ORAL at 17:12

## 2018-01-01 RX ADMIN — APIXABAN 2.5 MG: 5 TABLET, FILM COATED ORAL at 09:16

## 2018-01-01 RX ADMIN — CEFTRIAXONE 2 G: 2 INJECTION, POWDER, FOR SOLUTION INTRAMUSCULAR; INTRAVENOUS at 05:51

## 2018-01-01 RX ADMIN — CLINDAMYCIN HYDROCHLORIDE 150 MG: 150 CAPSULE ORAL at 11:31

## 2018-01-01 RX ADMIN — OXYBUTYNIN CHLORIDE 5 MG: 5 TABLET ORAL at 09:16

## 2018-01-01 RX ADMIN — POTASSIUM CHLORIDE AND SODIUM CHLORIDE: 900; 150 INJECTION, SOLUTION INTRAVENOUS at 18:38

## 2018-01-01 RX ADMIN — LISINOPRIL 5 MG: 5 TABLET ORAL at 11:23

## 2018-01-01 RX ADMIN — CARVEDILOL 6.25 MG: 6.25 TABLET, FILM COATED ORAL at 11:24

## 2018-01-01 RX ADMIN — LORAZEPAM 2 MG: 2 INJECTION INTRAMUSCULAR; INTRAVENOUS at 10:21

## 2018-01-01 RX ADMIN — LATANOPROST 1 DROP: 50 SOLUTION OPHTHALMIC at 18:12

## 2018-01-01 RX ADMIN — TIMOLOL MALEATE 1 DROP: 5 SOLUTION OPHTHALMIC at 18:40

## 2018-01-01 RX ADMIN — TIMOLOL MALEATE 1 DROP: 5 SOLUTION OPHTHALMIC at 17:28

## 2018-01-01 RX ADMIN — CARVEDILOL 6.25 MG: 6.25 TABLET, FILM COATED ORAL at 17:12

## 2018-01-01 RX ADMIN — CARVEDILOL 6.25 MG: 6.25 TABLET, FILM COATED ORAL at 08:25

## 2018-01-01 RX ADMIN — DOCUSATE SODIUM AND SENNOSIDES 2 TABLET: 8.6; 5 TABLET, FILM COATED ORAL at 09:15

## 2018-01-01 RX ADMIN — BETAXOLOL HYDROCHLORIDE 1 DROP: 2.8 SUSPENSION/ DROPS OPHTHALMIC at 08:25

## 2018-01-01 RX ADMIN — CEFTRIAXONE 2 G: 2 INJECTION, POWDER, FOR SOLUTION INTRAMUSCULAR; INTRAVENOUS at 06:35

## 2018-01-01 RX ADMIN — BETAXOLOL HYDROCHLORIDE 1 DROP: 2.8 SUSPENSION/ DROPS OPHTHALMIC at 17:12

## 2018-01-01 RX ADMIN — DOCUSATE SODIUM AND SENNOSIDES 2 TABLET: 8.6; 5 TABLET, FILM COATED ORAL at 08:25

## 2018-01-01 RX ADMIN — APIXABAN 2.5 MG: 5 TABLET, FILM COATED ORAL at 08:47

## 2018-01-01 RX ADMIN — CARVEDILOL 6.25 MG: 6.25 TABLET, FILM COATED ORAL at 17:17

## 2018-01-01 RX ADMIN — APIXABAN 2.5 MG: 5 TABLET, FILM COATED ORAL at 21:23

## 2018-01-01 RX ADMIN — ROSUVASTATIN CALCIUM 20 MG: 10 TABLET, FILM COATED ORAL at 21:54

## 2018-01-01 RX ADMIN — LATANOPROST 1 DROP: 50 SOLUTION/ DROPS OPHTHALMIC at 22:03

## 2018-01-01 RX ADMIN — VANCOMYCIN HYDROCHLORIDE 800 MG: 5 INJECTION, POWDER, LYOPHILIZED, FOR SOLUTION INTRAVENOUS at 18:08

## 2018-01-01 RX ADMIN — TIMOLOL MALEATE 1 DROP: 5 SOLUTION OPHTHALMIC at 08:30

## 2018-01-01 RX ADMIN — APIXABAN 2.5 MG: 5 TABLET, FILM COATED ORAL at 06:43

## 2018-01-01 RX ADMIN — LISINOPRIL 5 MG: 5 TABLET ORAL at 04:33

## 2018-01-01 RX ADMIN — APIXABAN 2.5 MG: 5 TABLET, FILM COATED ORAL at 18:18

## 2018-01-01 RX ADMIN — ONDANSETRON 4 MG: 2 INJECTION INTRAMUSCULAR; INTRAVENOUS at 20:37

## 2018-01-01 RX ADMIN — CARVEDILOL 6.25 MG: 6.25 TABLET, FILM COATED ORAL at 07:30

## 2018-01-01 RX ADMIN — TIMOLOL MALEATE 1 DROP: 5 SOLUTION OPHTHALMIC at 23:09

## 2018-01-01 RX ADMIN — FUROSEMIDE 40 MG: 10 INJECTION, SOLUTION INTRAMUSCULAR; INTRAVENOUS at 03:30

## 2018-01-01 RX ADMIN — DULOXETINE HYDROCHLORIDE 20 MG: 20 CAPSULE, DELAYED RELEASE ORAL at 08:25

## 2018-01-01 RX ADMIN — APIXABAN 2.5 MG: 5 TABLET, FILM COATED ORAL at 11:24

## 2018-01-01 RX ADMIN — CEFTRIAXONE 2 G: 2 INJECTION, POWDER, FOR SOLUTION INTRAMUSCULAR; INTRAVENOUS at 06:00

## 2018-01-01 RX ADMIN — CARVEDILOL 6.25 MG: 6.25 TABLET, FILM COATED ORAL at 08:52

## 2018-01-01 SDOH — ECONOMIC STABILITY: HOUSING INSECURITY: UNSAFE COOKING RANGE AREA: 0

## 2018-01-01 SDOH — ECONOMIC STABILITY: HOUSING INSECURITY: UNSAFE APPLIANCES: 0

## 2018-01-01 SDOH — ECONOMIC STABILITY: HOUSING INSECURITY
HOME SAFETY: IN THE HOME. SMOKE ALARMS ARE PRESENT AND FUNCTIONAL ON EACH LEVEL OF THE HOME. PATIENT DOES HAVE A FIRE ESCAPE PLAN DEVELOPED. PATIENT DOES NOT HAVE FLAMMABLE MATERIALS PRESENT IN THE HOME PRESENTING A FIRE HAZARD. NO EVIDENCE FOUND OF SMOKING MATER

## 2018-01-01 SDOH — ECONOMIC STABILITY: HOUSING INSECURITY
HOME SAFETY: OXYGEN SAFETY RISK ASSESSMENT PERFORMED. PATIENT DOES NOT HAVE A NO SMOKING SIGN POSTED IN THE HOME., PT WAS GIVEN A NO SMOKING SIGN AND PROVIDED EDUCATION ABOUT WHY IT IS IMPORTANT TO PLACE ONE. PATIENT DOES HAVE A WORKING FIRE EXTINGUISHER PRESENT

## 2018-01-01 SDOH — ECONOMIC STABILITY: HOUSING INSECURITY: HOME SAFETY: IALS PRESENT IN THE HOME.

## 2018-01-01 SDOH — ECONOMIC STABILITY: HOUSING INSECURITY
HOME SAFETY: RE EXTINGUISHER PRESENT IN THE HOME. SMOKE ALARMS ARE PRESENT AND FUNCTIONAL ON EACH LEVEL OF THE HOME. PATIENT DOES HAVE A FIRE ESCAPE PLAN DEVELOPED. PATIENT DOES NOT HAVE FLAMMABLE MATERIALS PRESENT IN THE HOME PRESENTING A FIRE HAZARD. NO EVIDENC

## 2018-01-01 SDOH — ECONOMIC STABILITY: HOUSING INSECURITY: HOME SAFETY: E FOUND OF SMOKING MATERIALS PRESENT IN THE HOME.

## 2018-01-01 SDOH — ECONOMIC STABILITY: HOUSING INSECURITY
HOME SAFETY: PT HAS A DOG, MULTIPLE THROW AND AREA RUGS, EXTENDED OXYGEN TUBING THAT CREATES A POTENTIAL RISK AS A TRIP/SLIP HAZARD.    OXYGEN SAFETY RISK ASSESSMENT PERFORMED. PATIENT DOES HAVE A NO SMOKING SIGN POSTED IN THE HOME. PATIENT DOES HAVE A WORKING FI

## 2018-01-01 ASSESSMENT — ACTIVITIES OF DAILY LIVING (ADL)
HOME_HEALTH_OASIS: 03
HOME_HEALTH_OASIS: 01
HOME_HEALTH_OASIS: 01
OASIS_M1830: 03
TOILETING: INDEPENDENT
HOME_HEALTH_OASIS: 02
OASIS_M1830: 03
OASIS_M1830: 03

## 2018-01-01 ASSESSMENT — PATIENT HEALTH QUESTIONNAIRE - PHQ9
1. LITTLE INTEREST OR PLEASURE IN DOING THINGS: 00
1. LITTLE INTEREST OR PLEASURE IN DOING THINGS: NOT AT ALL
SUM OF ALL RESPONSES TO PHQ9 QUESTIONS 1 AND 2: 0
2. FEELING DOWN, DEPRESSED, IRRITABLE, OR HOPELESS: NOT AT ALL
1. LITTLE INTEREST OR PLEASURE IN DOING THINGS: NOT AT ALL
2. FEELING DOWN, DEPRESSED, IRRITABLE, OR HOPELESS: 00
2. FEELING DOWN, DEPRESSED, IRRITABLE, OR HOPELESS: NOT AT ALL
1. LITTLE INTEREST OR PLEASURE IN DOING THINGS: NOT AT ALL
SUM OF ALL RESPONSES TO PHQ9 QUESTIONS 1 AND 2: 0
1. LITTLE INTEREST OR PLEASURE IN DOING THINGS: 00
1. LITTLE INTEREST OR PLEASURE IN DOING THINGS: NOT AT ALL
SUM OF ALL RESPONSES TO PHQ9 QUESTIONS 1 AND 2: 0
2. FEELING DOWN, DEPRESSED, IRRITABLE, OR HOPELESS: 00
SUM OF ALL RESPONSES TO PHQ9 QUESTIONS 1 AND 2: 0
1. LITTLE INTEREST OR PLEASURE IN DOING THINGS: NOT AT ALL
SUM OF ALL RESPONSES TO PHQ QUESTIONS 1-9: 0
1. LITTLE INTEREST OR PLEASURE IN DOING THINGS: NOT AT ALL
SUM OF ALL RESPONSES TO PHQ9 QUESTIONS 1 AND 2: 0
SUM OF ALL RESPONSES TO PHQ9 QUESTIONS 1 AND 2: 0
2. FEELING DOWN, DEPRESSED, IRRITABLE, OR HOPELESS: NOT AT ALL
SUM OF ALL RESPONSES TO PHQ9 QUESTIONS 1 AND 2: 0
2. FEELING DOWN, DEPRESSED, IRRITABLE, OR HOPELESS: NOT AT ALL
SUM OF ALL RESPONSES TO PHQ9 QUESTIONS 1 AND 2: 0
1. LITTLE INTEREST OR PLEASURE IN DOING THINGS: NOT AT ALL
1. LITTLE INTEREST OR PLEASURE IN DOING THINGS: NOT AT ALL
2. FEELING DOWN, DEPRESSED, IRRITABLE, OR HOPELESS: NOT AT ALL

## 2018-01-01 ASSESSMENT — ENCOUNTER SYMPTOMS
SHORTNESS OF BREATH: T
HEADACHES: 0
COUGH: 0
CHILLS: 0
MYALGIAS: 0
SHORTNESS OF BREATH: T
NAUSEA: 0
EYE PAIN: 0
HEMOPTYSIS: 0
DEPRESSION: 0
ABDOMINAL PAIN: 1
SPUTUM PRODUCTION: 1
HYPERTENSION: 1
DEPRESSION: 0
NAUSEA: 1
RESPIRATORY NEGATIVE: 1
TINGLING: 0
COUGH: 0
NECK PAIN: 0
CARDIOVASCULAR NEGATIVE: 1
COUGH: 0
NECK PAIN: 0
DEBILITATING PAIN: 1
CONSTIPATION: 0
HALLUCINATIONS: 0
TREMORS: 0
SPEECH CHANGE: 0
FEVER: 0
NERVOUS/ANXIOUS: 0
MUSCULOSKELETAL NEGATIVE: 1
VOMITING: 0
TINGLING: 0
TINGLING: 0
SPUTUM PRODUCTION: 0
MYALGIAS: 1
PALPITATIONS: 0
NEUROLOGICAL NEGATIVE: 1
COUGH: 0
MYALGIAS: 1
DIARRHEA: 0
BACK PAIN: 0
PSYCHIATRIC NEGATIVE: 1
DOUBLE VISION: 0
DEPRESSION: 0
PHOTOPHOBIA: 0
HEADACHES: 0
VOMITING: DENIES
DEPRESSION: 0
EYE REDNESS: 0
WHEEZING: 0
POOR JUDGMENT: 1
PSYCHIATRIC NEGATIVE: 1
FEVER: 0
DEBILITATING PAIN: 1
VOMITING: 0
PHOTOPHOBIA: 0
MYALGIAS: 0
FEVER: 0
SHORTNESS OF BREATH: 0
CONSTITUTIONAL NEGATIVE: 1
EYE PAIN: 0
DOUBLE VISION: 0
ORTHOPNEA: 0
CHILLS: 0
BLURRED VISION: 0
MYALGIAS: 1
CLAUDICATION: 0
SEVERE DYSPNEA: 1
PSYCHIATRIC NEGATIVE: 1
FEVER: 0
WEAKNESS: 1
FLANK PAIN: 0
DEPRESSION: 0
COUGH: 1
TINGLING: 0
VOMITING: 0
CONSTITUTIONAL NEGATIVE: 1
MEMORY LOSS: 0
PND: 0
NAUSEA: DENIES
WEAKNESS: 1
ABDOMINAL PAIN: 0
HALLUCINATIONS: 0
WEIGHT LOSS: 1
BLOOD IN STOOL: 0
HEARTBURN: 0
INSOMNIA: 0
NAUSEA: 0
MYALGIAS: 0
MUSCULOSKELETAL NEGATIVE: 1
PSYCHIATRIC NEGATIVE: 1
NAUSEA: 0
NAUSEA: DENIES
EYES NEGATIVE: 1
HEARTBURN: 0
ABDOMINAL PAIN: 0
PALPITATIONS: 0
BLURRED VISION: 0
HEARTBURN: 0
ORTHOPNEA: 0
HEMOPTYSIS: 0
CLAUDICATION: 0
BLURRED VISION: 0
VOMITING: DENIES
TREMORS: 0
ABDOMINAL PAIN: 0
GASTROINTESTINAL NEGATIVE: 1
CONSTITUTIONAL NEGATIVE: 1
HEADACHES: 0
DOUBLE VISION: 0
COUGH: 1
ABDOMINAL PAIN: 0
TINGLING: 0
FEVER: 0
NAUSEA: 0
HEMOPTYSIS: 0
CLAUDICATION: 0
HEADACHES: 0
VOMITING: DENIES
DIZZINESS: 0
DIFFICULTY THINKING: 1
BLOOD IN STOOL: 0
COUGH: 0
CONSTIPATION: 0
VOMITING: 0
EYES NEGATIVE: 1
VOMITING: 0
SEVERE DYSPNEA: 1
SHORTNESS OF BREATH: T
FLANK PAIN: 0
DIZZINESS: 0
DIFFICULTY THINKING: 1
CLAUDICATION: 0
EYE PAIN: 0
DOUBLE VISION: 0
ABDOMINAL PAIN: 0
PALPITATIONS: 0
CHILLS: 0
NAUSEA: 0
RESPIRATORY NEGATIVE: 1
POOR JUDGMENT: 1
VOMITING: DENIES
EYES NEGATIVE: 1
VOMITING: 0
COUGH: 0
SHORTNESS OF BREATH: 0
HEADACHES: 0
SEVERE DYSPNEA: 1
HEADACHES: 0
ABDOMINAL PAIN: 0
NAUSEA: 0
FEVER: 0
CARDIOVASCULAR NEGATIVE: 1
SPUTUM PRODUCTION: 0
NEUROLOGICAL NEGATIVE: 1
ORTHOPNEA: 0
DEPRESSION: 0
BLOOD IN STOOL: 0
DIZZINESS: 0
PND: 0
WEAKNESS: 1
SHORTNESS OF BREATH: 0
NERVOUS/ANXIOUS: 0
GASTROINTESTINAL NEGATIVE: 1
PALPITATIONS: 0
PND: 1
MUSCULOSKELETAL NEGATIVE: 1
PALPITATIONS: 0
BACK PAIN: 0
FEVER: 0
SORE THROAT: 0
BACK PAIN: 1
HEADACHES: 0
BLURRED VISION: 0
DIZZINESS: 0
PALPITATIONS: 0
DIZZINESS: 0
DEPRESSION: 0
SHORTNESS OF BREATH: 1
WEAKNESS: 1
EYE PAIN: 0
HEARTBURN: 0
EYES NEGATIVE: 1
GASTROINTESTINAL NEGATIVE: 1
NAUSEA: DENIES
SORE THROAT: 0
DIARRHEA: 0
DIZZINESS: 0
NEUROLOGICAL NEGATIVE: 1
ABDOMINAL PAIN: 0
SORE THROAT: 0
SHORTNESS OF BREATH: 0
TREMORS: 0
DIZZINESS: 0
SEVERE DYSPNEA: 1
DIZZINESS: 0
GASTROINTESTINAL NEGATIVE: 1
BLURRED VISION: 0
COUGH: 0
SHORTNESS OF BREATH: T
RESPIRATORY NEGATIVE: 1
MYALGIAS: 0
VOMITING: DENIES
CHILLS: 1
SEVERE DYSPNEA: 1
SHORTNESS OF BREATH: 1
COUGH: 0
NEUROLOGICAL NEGATIVE: 1
DIFFICULTY THINKING: 1
DIAPHORESIS: 0
HEARTBURN: 0
FEVER: 0
SPUTUM PRODUCTION: 1
SHORTNESS OF BREATH: 0
MYALGIAS: 1
VOMITING: DENIES
CHILLS: 0
PHOTOPHOBIA: 0
DIAPHORESIS: 0
SHORTNESS OF BREATH: 1
SEVERE DYSPNEA: 1
VOMITING: DENIES
SPUTUM PRODUCTION: 0
FOCAL WEAKNESS: 0
FEVER: 0
SHORTNESS OF BREATH: 1
DIAPHORESIS: 0
SHORTNESS OF BREATH: 0
WEAKNESS: 1
COUGH: 0
PSYCHIATRIC NEGATIVE: 1
DIARRHEA: 0
SHORTNESS OF BREATH: 0
SHORTNESS OF BREATH: T
CHILLS: 0
SENSORY CHANGE: 0
CONSTITUTIONAL NEGATIVE: 1
DOUBLE VISION: 0
PND: 0
MUSCULOSKELETAL NEGATIVE: 1
DEPRESSION: 0
VOMITING: DENIES
VOMITING: 0
BACK PAIN: 0
WEAKNESS: 1
HEADACHES: 0
SHORTNESS OF BREATH: 1
HEADACHES: 0
DIZZINESS: 0
STRIDOR: 0
FEVER: 0
DIZZINESS: 0
HEMOPTYSIS: 0
PALPITATIONS: 0
CARDIOVASCULAR NEGATIVE: 1
BACK PAIN: 0
LOSS OF CONSCIOUSNESS: 0
SINUS PAIN: 0
WEAKNESS: 0
BRUISES/BLEEDS EASILY: 0

## 2018-01-01 ASSESSMENT — CHA2DS2 SCORE
HYPERTENSION: YES
AGE 75 OR GREATER: YES
SEX: FEMALE
HYPERTENSION: YES
CHA2DS2 VASC SCORE: 8
CHF OR LEFT VENTRICULAR DYSFUNCTION: YES
DIABETES: NO
PRIOR STROKE OR TIA OR THROMBOEMBOLISM: YES
AGE 75 OR GREATER: YES
CHA2DS2 VASC SCORE: 8
VASCULAR DISEASE: YES
DIABETES: NO
AGE 65 TO 74: NO
AGE 65 TO 74: NO
VASCULAR DISEASE: YES
SEX: FEMALE
PRIOR STROKE OR TIA OR THROMBOEMBOLISM: YES
CHF OR LEFT VENTRICULAR DYSFUNCTION: YES

## 2018-01-01 ASSESSMENT — COGNITIVE AND FUNCTIONAL STATUS - GENERAL
MOBILITY SCORE: 18
DRESSING REGULAR LOWER BODY CLOTHING: TOTAL
EATING MEALS: A LITTLE
EATING MEALS: A LOT
TOILETING: TOTAL
DRESSING REGULAR UPPER BODY CLOTHING: A LOT
DAILY ACTIVITIY SCORE: 23
TOILETING: A LITTLE
SUGGESTED CMS G CODE MODIFIER MOBILITY: CK
MOBILITY SCORE: 19
HELP NEEDED FOR BATHING: TOTAL
DRESSING REGULAR UPPER BODY CLOTHING: A LITTLE
HELP NEEDED FOR BATHING: A LITTLE
SUGGESTED CMS G CODE MODIFIER MOBILITY: CK
DAILY ACTIVITIY SCORE: 9
MOVING TO AND FROM BED TO CHAIR: A LITTLE
STANDING UP FROM CHAIR USING ARMS: A LITTLE
PERSONAL GROOMING: A LITTLE
DRESSING REGULAR LOWER BODY CLOTHING: A LITTLE
MOVING FROM LYING ON BACK TO SITTING ON SIDE OF FLAT BED: A LITTLE
MOVING FROM LYING ON BACK TO SITTING ON SIDE OF FLAT BED: A LITTLE
WALKING IN HOSPITAL ROOM: A LITTLE
TURNING FROM BACK TO SIDE WHILE IN FLAT BAD: A LITTLE
MOVING TO AND FROM BED TO CHAIR: A LITTLE
CLIMB 3 TO 5 STEPS WITH RAILING: A LOT
WALKING IN HOSPITAL ROOM: A LITTLE
SUGGESTED CMS G CODE MODIFIER DAILY ACTIVITY: CK
SUGGESTED CMS G CODE MODIFIER DAILY ACTIVITY: CI
CLIMB 3 TO 5 STEPS WITH RAILING: A LITTLE
PERSONAL GROOMING: A LOT
SUGGESTED CMS G CODE MODIFIER DAILY ACTIVITY: CL
DAILY ACTIVITIY SCORE: 18
TOILETING: A LITTLE

## 2018-01-01 ASSESSMENT — PAIN SCALES - GENERAL
PAINLEVEL_OUTOF10: 0
PAINLEVEL: NO PAIN
PAINLEVEL_OUTOF10: 0

## 2018-01-01 ASSESSMENT — LIFESTYLE VARIABLES
SUBSTANCE_ABUSE: 0
SUBSTANCE_ABUSE: 0
ALCOHOL_USE: NO
SUBSTANCE_ABUSE: 0
EVER_SMOKED: NEVER
SUBSTANCE_ABUSE: 0
EVER_SMOKED: NEVER
ALCOHOL_USE: NO
EVER_SMOKED: NEVER
EVER_SMOKED: YES
EVER_SMOKED: YES
ALCOHOL_USE: NO
EVER_SMOKED: YES
EVER_SMOKED: NEVER

## 2018-01-01 ASSESSMENT — GAIT ASSESSMENTS
GAIT LEVEL OF ASSIST: UNABLE TO PARTICIPATE
ASSISTIVE DEVICE: FRONT WHEEL WALKER
DISTANCE (FEET): 0
DISTANCE (FEET): 75
DEVIATION: DECREASED HEEL STRIKE;DECREASED TOE OFF
GAIT LEVEL OF ASSIST: STAND BY ASSIST
ASSISTIVE DEVICE: FRONT WHEEL WALKER

## 2018-01-01 ASSESSMENT — PAIN SCALES - WONG BAKER
WONGBAKER_NUMERICALRESPONSE: DOESN'T HURT AT ALL

## 2018-01-01 ASSESSMENT — COPD QUESTIONNAIRES
DO YOU EVER COUGH UP ANY MUCUS OR PHLEGM?: YES, A FEW DAYS A WEEK OR MONTH
DURING THE PAST 4 WEEKS HOW MUCH DID YOU FEEL SHORT OF BREATH: MOST  OR ALL OF THE TIME
IN THE PAST 12 MONTHS DO YOU DO LESS THAN YOU USED TO BECAUSE OF YOUR BREATHING PROBLEMS: AGREE
COPD SCREENING SCORE: 8
HAVE YOU SMOKED AT LEAST 100 CIGARETTES IN YOUR ENTIRE LIFE: YES

## 2018-01-03 PROBLEM — I50.9 CHF EXACERBATION (HCC): Status: ACTIVE | Noted: 2018-01-01

## 2018-01-03 PROBLEM — R13.10 DIFFICULTY SWALLOWING PILLS: Status: ACTIVE | Noted: 2018-01-01

## 2018-01-03 PROBLEM — R19.8 DIFFICULTY SWALLOWING PILLS: Status: ACTIVE | Noted: 2018-01-01

## 2018-01-03 PROBLEM — I48.91 ATRIAL FIBRILLATION (HCC): Status: ACTIVE | Noted: 2018-01-01

## 2018-01-03 PROBLEM — I50.23 ACUTE ON CHRONIC SYSTOLIC CONGESTIVE HEART FAILURE (HCC): Status: ACTIVE | Noted: 2018-01-01

## 2018-01-03 NOTE — ED NOTES
Chief Complaint   Patient presents with   • Shortness of Breath     since this AM   • Leg Swelling     HX of CHF, swelling increased for 2 weeks     BP (!) 169/99   Pulse 72   Temp 37.5 °C (99.5 °F)   Resp 20   Ht 1.524 m (5')   Wt 52.3 kg (115 lb 4.8 oz)   SpO2 93%   BMI 22.52 kg/m²

## 2018-01-03 NOTE — ED PROVIDER NOTES
ED Provider Note    CHIEF COMPLAINT  Chief Complaint   Patient presents with   • Shortness of Breath     since this AM   • Leg Swelling     HX of CHF, swelling increased for 2 weeks       HPI  Barbara Murray is a 89 y.o. female who presents To the emergency department with chief complaint shortness of breath. Apparently the shortness of breath has going on for approximate 2 weeks but apparently, much worse this morning. The patient notes that she has a history of congestive heart failure that she feels that she's having some congestive heart failure symptoms at this time. She notes bilateral lower summary swelling, shortness of breath, generalized weakness. She denied any fever. Mild cough. No vomiting. No diarrhea. No chest or abdominal pain.    REVIEW OF SYSTEMS  See HPI for further details. All other systems are negative.     PAST MEDICAL HISTORY  Past Medical History:   Diagnosis Date   • Abnormal EKG    • Anesthesia     During cataract procdure, not completely effective   • Arrest of bone development or growth    • Arrhythmia    • Arthritis    • Atrial fibrillation (CMS-HCC) September 2014    Echocardiogram with LVEF 45-50%, mild MS, moderate MR, moderate TR. RVSP 50-55mmHg.   • Backpain    • CAD (coronary artery disease) August 2013    Coronary angiogram with 40-50% stenosis of distal PDA, diagonal ostial 40-50%, RCA ostial 60-70%. LVEF 45-50%   • CATARACT     Status post removal   • Cellulitis and abscess of upper arm and forearm    • CHF (congestive heart failure) (CMS-HCC) 5/11/2015   • Chronic UTI (urinary tract infection) 3/30/2017   • Coronary heart disease    • Crohns disease (CMS-HCC)    • Degeneration of lumbar or lumbosacral intervertebral disc    • Disorder of bone and cartilage, unspecified    • Disorders of bursae and tendons in shoulder region, unspecified    • Glaucoma    • Heart burn    • Hiatus hernia syndrome    • High cholesterol    • Hyperlipidemia    • Hypertension    • Hypertonicity of  bladder    • MI (myocardial infarction)     • Osteoporosis, unspecified    • Other extrapyramidal disease and abnormal movement disorder    • Other malaise and fatigue    • Other specified disorder of intestines     Diarrhea   • Pain     low back,pain scale 7   • Pain     R leg, R hand   • Pneumonia    • Reflux esophagitis    • Restless leg syndrome    • Rheumatoid arthritis (CMS-HCC)    • Rotator cuff (capsule) sprain    • Severe tricuspid regurgitation by prior echocardiogram 11/5/2016   • Stroke (CMS-HCC) August 2013    speech deficit   • Symptomatic menopausal or female climacteric states    • Unspecified hemorrhagic conditions     Nosebleeds   • Unspecified urinary incontinence        FAMILY HISTORY  Family History   Problem Relation Age of Onset   • Heart Disease Mother    • Heart Failure Mother    • Lung Disease Father    • Stroke Sister        SOCIAL HISTORY  Social History     Social History   • Marital status: Single     Spouse name: N/A   • Number of children: N/A   • Years of education: N/A     Social History Main Topics   • Smoking status: Former Smoker     Years: 2.00     Types: Cigarettes     Start date: 1/1/1956     Quit date: 11/19/1958   • Smokeless tobacco: Never Used      Comment: .5 ppd 9 yrs,quit 1966   • Alcohol use Yes      Comment: 1 per week   • Drug use: No   • Sexual activity: Not Currently     Birth control/ protection: Post-Menopausal     Other Topics Concern   • Not on file     Social History Narrative   • No narrative on file       SURGICAL HISTORY  Past Surgical History:   Procedure Laterality Date   • CARPAL TUNNEL RELEASE Right 10/20/2015    Procedure: CARPAL TUNNEL RELEASE OPEN;  Surgeon: Ar Macario M.D.;  Location: SURGERY [facility];  Service:    • TRIGGER FINGER RELEASE Right 10/20/2015    Procedure: TRIGGER FINGER RELEASE MIDDLE AND RING;  Surgeon: Ar Macario M.D.;  Location: SURGERY [facility];  Service:    • GASTROSCOPY WITH BALLOON DILATATION   7/25/2013    Performed by Jed Hoyt Jr., M.D. at SURGERY [facility] ORS   • INGUINAL HERNIA REPAIR BILATERAL  3/2/2011    Performed by NIKOLE BELTRAN at SURGERY [facility] ORS   • KNEE ARTHROPLASTY TOTAL  6/21/2010    Performed by INNA BILLS at SURGERY [facility] ORS   • ROTATOR CUFF REPAIR Right 2008    dr bills   • CATARACT PHACO WITH IOL  2007   • MIC BY LAPAROSCOPY  1994   • TONSILLECTOMY  1952   • OTHER  1935    Tube insertion,left lung   • ARTHROSCOPY, KNEE     • CHOLECYSTECTOMY     • OTHER      Epidural    • OTHER      Spinal meningitis   • PB REMV 2ND CATARACT,CORN-SCLER SECTN         CURRENT MEDICATIONS  Home Medications     Reviewed by Kim Holt (Pharmacy Tech) on 01/03/18 at 1228  Med List Status: Partial   Medication Last Dose Status   apixaban (ELIQUIS) 2.5mg Tab 1/3/2018 Active   ascorbic acid (ASCORBIC ACID) 500 MG Tab 1/2/2018 Active   betaxolol (BETOPTIC-S) 0.25 % SUSP 1/2/2018 Active   carvedilol (COREG) 6.25 MG Tab 1/2/2018 Active   Cholecalciferol (VITAMIN D) 2000 UNITS Cap 1/2/2018 Active   duloxetine (CYMBALTA) 20 MG Cap DR Particles 1/2/2018 Active   ferrous sulfate 325 (65 Fe) MG tablet 1/2/2018 Active   furosemide (LASIX) 20 MG Tab 1/2/2018 Active   latanoprost (XALATAN) 0.005 % SOLN 1/2/2018 Active   lisinopril (PRINIVIL) 5 MG Tab 1/2/2018 Active   nitrofurantoin monohydr macro (MACROBID) 100 MG Cap 1/2/2018 Active   oxybutynin (DITROPAN) 5 MG Tab 1/2/2018 Active   ropinirole (REQUIP) 1 MG Tab 1/3/2018 Active   rosuvastatin (CRESTOR) 20 MG Tab 1/2/2018 Active   tramadol (ULTRAM-ER) 100 MG tablet 1/2/2018 Active                ALLERGIES  Allergies   Allergen Reactions   • Pcn [Penicillins] Hives   • Sulfa Drugs Nausea       PHYSICAL EXAM  VITAL SIGNS: BP (!) 169/99   Pulse 71   Temp 37.5 °C (99.5 °F)   Resp 18   Ht 1.524 m (5')   Wt 52.3 kg (115 lb 4.8 oz)   SpO2 100%   BMI 22.52 kg/m²   Constitutional: Well developed, Well nourished, No acute  distress, Non-toxic appearance.   HENT: Normocephalic, Atraumatic, Bilateral external ears normal, Oropharynx moist, No oral exudates, Nose normal.   Eyes: PERRLA, EOMI, Conjunctiva normal, No discharge.   Neck: Normal range of motion, No tenderness, Supple, No stridor.   Cardiovascular:Regular rate and rhythm, no audible murmur  Thorax & Lungs: Tachypneic, supplemental oxygen in place, scattered rhonchi, bibasilar rales noted. No wheezing.  Abdomen: Bowel sounds normal, Soft, No tenderness, No masses, No pulsatile masses.   Skin: Warm, Dry, No erythema, No rash.   Back: No tenderness, No CVA tenderness.   Extremities: Intact distal pulses, No tenderness, No cyanosis, No clubbing. 1+ bilateral lower stomach edema.  Neurologic: Alert & oriented x 3, Normal motor function, Normal sensory function, No focal deficits noted.     EKG  12-lead EKG obtained at 12:15, interpreted by me, atrial fibrillation at a rate of 79, normal axis, normal intervals, loss of anterior forces noted, no diagnostic ST segment elevation, abnormal EKG, similar to an EKG dated 11/4/2016. With no acute changes.    RADIOLOGY/PROCEDURES  DX-CHEST-PORTABLE (1 VIEW)   Final Result      Stable chest with cardiac silhouette enlargement, large hiatal hernia, remote granulomatous disease and right upper lung zone scarring            COURSE & MEDICAL DECISION MAKING  Pertinent Labs & Imaging studies reviewed. (See chart for details)    Patient presents today with shortness of breath. Physical examination is marked for findings consistent with congestive heart failure. The patient does not have any infectious symptoms. I do not feel that she has pneumonia. She may have a mild viral upper respiratory infection.    Laboratory studies remarkable for elevated BNP, cardiomegaly noted on chest x-ray but no pulmonary edema or infiltrate.    On examination the patient is elderly, debilitated, and significant dyspneic. And feeling weak. Do not feel that she can  perform activities of daily living. She would benefit from admission hospital for further evaluation and treatment. I spoke with the on-call hospice for admission.    FINAL IMPRESSION  1. Dyspnea, unspecified type    2. Acute systolic congestive heart failure (CMS-HCC)    3. Elevated partial thromboplastin time (PTT)              Electronically signed by: Jorge Alberto Hernandez, 1/3/2018 2:18 PM

## 2018-01-03 NOTE — TELEPHONE ENCOUNTER
----- Message from Alycia Joaquin sent at 1/3/2018  9:37 AM PST -----  Regarding: Patient is having trouble breathing  AM/Bushra    Patient's caregiver, Adriana, wants a call back at [phone]. Patient is having trouble breathing.

## 2018-01-03 NOTE — ED NOTES
Pt's POA, caregiver wishes to be updated with any changes in condition/updates/questions. Pt verbalized approval   [phone] Adriana Ba.

## 2018-01-03 NOTE — H&P
" Hospital Medicine History and Physical    Date of Service  1/3/2018    Chief Complaint  Chief Complaint   Patient presents with   • Shortness of Breath     since this AM   • Leg Swelling     HX of CHF, swelling increased for 2 weeks       History of Presenting Illness  89 y.o. female who presented 1/3/2018 with dyspnea. She has noted a slow progression of her SOB over the last 2-3 weeks. She has not been seen by her PCP or urgent care during that time. On the day of admission, she just couldn't handle it anymore and came in for evaluation. She just recently started getting over a flu-like vs URI illness with cough and congestion. She explains that she doesn't not take all her medications regularly because she will vomit them up. She states that it's because she has \"13 pills\" and can't keep them down. Thinks her last lasix dose was about 2-3 days prior. Admits to dysarthria since her stroke but doesn't think there is a correlation between the 2.      Primary Care Physician  Mahendra Morrow M.D.    Consultants  None    Code Status  Partial code- DNI but compression and shock ok    Review of Systems  Review of Systems   Constitutional: Negative for chills and fever.   HENT: Positive for congestion.    Respiratory: Positive for cough, sputum production and shortness of breath. Negative for hemoptysis.    Cardiovascular: Positive for leg swelling and PND. Negative for chest pain and palpitations.   Gastrointestinal: Negative for abdominal pain, nausea and vomiting.   Genitourinary: Negative for dysuria.   Neurological: Negative for headaches.   Psychiatric/Behavioral: Negative.    All other systems reviewed and are negative.       Past Medical History  Past Medical History:   Diagnosis Date   • Chronic UTI (urinary tract infection) 3/30/2017   • Severe tricuspid regurgitation by prior echocardiogram 11/5/2016   • CHF (congestive heart failure) (CMS-[facility]) 5/11/2015   • Atrial fibrillation (CMS-HCC) September 2014    " Echocardiogram with LVEF 45-50%, mild MS, moderate MR, moderate TR. RVSP 50-55mmHg.   • CAD (coronary artery disease) August 2013    Coronary angiogram with 40-50% stenosis of distal PDA, diagonal ostial 40-50%, RCA ostial 60-70%. LVEF 45-50%   • Stroke (CMS-HCC) August 2013    speech deficit   • Abnormal EKG    • Anesthesia     During cataract procdure, not completely effective   • Arrest of bone development or growth    • Arrhythmia    • Arthritis    • Backpain    • CATARACT     Status post removal   • Cellulitis and abscess of upper arm and forearm    • Coronary heart disease    • Crohns disease (CMS-HCC)    • Degeneration of lumbar or lumbosacral intervertebral disc    • Disorder of bone and cartilage, unspecified    • Disorders of bursae and tendons in shoulder region, unspecified    • Glaucoma    • Heart burn    • Hiatus hernia syndrome    • High cholesterol    • Hyperlipidemia    • Hypertension    • Hypertonicity of bladder    • MI (myocardial infarction)     • Osteoporosis, unspecified    • Other extrapyramidal disease and abnormal movement disorder    • Other malaise and fatigue    • Other specified disorder of intestines     Diarrhea   • Pain     low back,pain scale 7   • Pain     R leg, R hand   • Pneumonia    • Reflux esophagitis    • Restless leg syndrome    • Rheumatoid arthritis (CMS-HCC)    • Rotator cuff (capsule) sprain    • Symptomatic menopausal or female climacteric states    • Unspecified hemorrhagic conditions     Nosebleeds   • Unspecified urinary incontinence        Surgical History  Past Surgical History:   Procedure Laterality Date   • CARPAL TUNNEL RELEASE Right 10/20/2015    Procedure: CARPAL TUNNEL RELEASE OPEN;  Surgeon: Ar Macario M.D.;  Location: SURGERY [facility];  Service:    • TRIGGER FINGER RELEASE Right 10/20/2015    Procedure: TRIGGER FINGER RELEASE MIDDLE AND RING;  Surgeon: Ar Macario M.D.;  Location: SURGERY [facility];  Service:    • GASTROSCOPY WITH  BALLOON DILATATION  7/25/2013    Performed by Jed Hoyt Jr., M.D. at SURGERY [facility] ORS   • INGUINAL HERNIA REPAIR BILATERAL  3/2/2011    Performed by NIKOLE BELTRAN at SURGERY [facility] ORS   • KNEE ARTHROPLASTY TOTAL  6/21/2010    Performed by INNA BILLS at SURGERY [facility] ORS   • ROTATOR CUFF REPAIR Right 2008    dr bills   • CATARACT PHACO WITH IOL  2007   • MIC BY LAPAROSCOPY  1994   • TONSILLECTOMY  1952   • OTHER  1935    Tube insertion,left lung   • ARTHROSCOPY, KNEE     • CHOLECYSTECTOMY     • OTHER      Epidural    • OTHER      Spinal meningitis   • PB REMV 2ND CATARACT,CORN-SCLER SECTN         Medications  No current facility-administered medications on file prior to encounter.      Current Outpatient Prescriptions on File Prior to Encounter   Medication Sig Dispense Refill   • duloxetine (CYMBALTA) 20 MG Cap DR Particles Take 1 Cap by mouth every day. (Patient taking differently: Take 20 mg by mouth 2 times a day.) 30 Cap 11   • nitrofurantoin monohydr macro (MACROBID) 100 MG Cap Take 1 Cap by mouth every day. (Patient taking differently: Take 100 mg by mouth 2 times a day.) 90 Cap 4   • ferrous sulfate 325 (65 Fe) MG tablet Take 1 Tab by mouth every day. 90 Tab 3   • tramadol (ULTRAM-ER) 100 MG tablet Take 100 mg by mouth 1 time daily as needed.  1   • ascorbic acid (ASCORBIC ACID) 500 MG Tab Take 500 mg by mouth every day.     • carvedilol (COREG) 6.25 MG Tab Take 1 Tab by mouth every day.     • furosemide (LASIX) 20 MG Tab Take 1 Tab by mouth every day.     • oxybutynin (DITROPAN) 5 MG Tab Take 1 Tab by mouth every day.     • apixaban (ELIQUIS) 2.5mg Tab Take 1 Tab by mouth 2 Times a Day. (Patient taking differently: Take 2.5 mg by mouth every day.) 180 Tab 3   • rosuvastatin (CRESTOR) 20 MG Tab TAKE 1 TABLET ORALLY DAILY EVERY EVENING 90 Tab 3   • lisinopril (PRINIVIL) 5 MG Tab Take 1 Tab by mouth every day. 90 Tab 4   • ropinirole (REQUIP) 1 MG Tab Take 1 Tab by mouth 3  times a day. 90 Tab 11   • Cholecalciferol (VITAMIN D) 2000 UNITS Cap Take 1 Cap by mouth every day. 100 Cap 4   • latanoprost (XALATAN) 0.005 % SOLN Place 1 Drop in both eyes every evening.     • betaxolol (BETOPTIC-S) 0.25 % SUSP Place 1 Drop in both eyes every day.         Family History  Family History   Problem Relation Age of Onset   • Heart Disease Mother    • Heart Failure Mother    • Lung Disease Father    • Stroke Sister     Strong family history of CHF.    Social History  Social History   Substance Use Topics   • Smoking status: Former Smoker     Years: 2.00     Types: Cigarettes     Start date: 1956     Quit date: 1958   • Smokeless tobacco: Never Used      Comment: .5 ppd 9 yrs,quit    • Alcohol use Yes      Comment: 1 per week   Former smoker. Social alcohol.    Allergies  Allergies   Allergen Reactions   • Pcn [Penicillins] Hives   • Sulfa Drugs Nausea        Physical Exam  Laboratory   Hemodynamics  Temp (24hrs), Av.1 °C (98.7 °F), Min:36.6 °C (97.9 °F), Max:37.5 °C (99.5 °F)   Temperature: 36.6 °C (97.9 °F)  Pulse  Av.9  Min: 70  Max: 78 Heart Rate (Monitored): 68  Blood Pressure : (!) 169/99, NIBP: 157/78      Respiratory      Respiration: 20, Pulse Oximetry: 99 %, O2 Daily Delivery Respiratory : Nasal Cannula        RUL Breath Sounds: Clear, RML Breath Sounds: Clear, RLL Breath Sounds: Diminished, PAWAN Breath Sounds: Rhonchi, LLL Breath Sounds: Diminished;Rhonchi    Physical Exam   Constitutional: She is oriented to person, place, and time. She appears well-developed. She appears distressed.   HENT:   Mouth/Throat: Oropharynx is clear and moist.   Eyes: Conjunctivae are normal. Pupils are equal, round, and reactive to light.   Neck: Normal range of motion. Neck supple. JVD (with hepatojugular reflex) present.   Cardiovascular: Normal rate and intact distal pulses.  An irregularly irregular rhythm present.   Murmur (holosystolic) heard.  Pulmonary/Chest: No respiratory  distress. She has no wheezes. She has rales.   Abdominal: Soft. Bowel sounds are normal. She exhibits no distension. There is no tenderness.   Musculoskeletal: She exhibits edema (1-2+ pitting edema).   Neurological: She is alert and oriented to person, place, and time.   Skin: Skin is warm and dry.   Psychiatric: She has a normal mood and affect. Her behavior is normal.   Vitals reviewed.      Recent Labs      01/03/18   1214   WBC  6.2   RBC  4.09*   HEMOGLOBIN  9.6*   HEMATOCRIT  31.1*   MCV  76.0*   MCH  23.5*   MCHC  30.9*   RDW  46.0   PLATELETCT  265   MPV  11.1     Recent Labs      01/03/18   1214   SODIUM  136   POTASSIUM  4.0   CHLORIDE  104   CO2  25   GLUCOSE  101*   BUN  22   CREATININE  0.68   CALCIUM  8.7     Recent Labs      01/03/18   1214   ALTSGPT  41   ASTSGOT  52*   ALKPHOSPHAT  60   TBILIRUBIN  1.1   GLUCOSE  101*     Recent Labs      01/03/18   1214   APTT  >240.0*   INR  1.16*     Recent Labs      01/03/18   1214   BNPBTYPENAT  349*         Lab Results   Component Value Date    TROPONINI <0.02 01/03/2018     Urinalysis:    Lab Results  Component Value Date/Time   SPECGRAVITY 1.010 10/11/2016 1059   GLUCOSEUR Negative 10/11/2016 1059   KETONES Negative 10/11/2016 1059   NITRITE Positive (A) 10/11/2016 1059   WBCURINE  (A) 10/11/2016 1059   RBCURINE 2-5 (A) 10/11/2016 1059   BACTERIA Moderate (A) 10/11/2016 1059        Imaging  DX-CHEST-PORTABLE (1 VIEW)   Final Result      Stable chest with cardiac silhouette enlargement, large hiatal hernia, remote granulomatous disease and right upper lung zone scarring      ECHOCARDIOGRAM COMP W/O CONT    (Results Pending)        Assessment/Plan     I anticipate this patient is appropriate for observation status at this time.    * Acute on chronic systolic congestive heart failure (CMS-HCC)- (present on admission)   Assessment & Plan    EF 50% in 2016, being medically managed but expect this exacerbation is 2/2 poor compliance with medications given  difficulty keeping them down, in addition to her recent URI. Unclear if she was hypoxemic, was 93% on 2L NC, no home O2 at baseline.  - bedside nursing swallow  - hold oral lasix 20mg PO --> lasix 20mg IV daily  - monitor I/Os, daily weights  - repeat echo  - continue lisinopril and coreg  - RT to follow, O2 as needed        Difficulty swallowing pills- (present on admission)   Assessment & Plan    2/2 past CVA? Has been seen by speech therapy in the past for extended therapies. Continues to have dysarthria.  - bedside swallow  - may need formal swallow evaluation        Atrial fibrillation (CMS-HCC)- (present on admission)   Assessment & Plan    Chronic Afib, continues to be in Afib. Currently rate controlled.  - continue eliquis, per [employer] she has been taking daily instead of BID            VTE prophylaxis: SCDs, on xarelto.

## 2018-01-03 NOTE — TELEPHONE ENCOUNTER
Caregiver just arrived at 0830. Pt. very SOB even at rest. LE edema. I advised Adriana to call 911 so pt. can be evaluated & transported to ER if necessary. She agrees with plan.

## 2018-01-03 NOTE — ED NOTES
Tomas from Lab called with critical result of PTT at >120.  Critical lab result read back to Tomas.   Dr. Hernandez notified of critical lab result at 1342.  Critical lab result read back by .

## 2018-01-03 NOTE — ED NOTES
Pt's caregiver updated on admit per request. Assisted to restroom via WC on 02. Resting back in rney with side rails raised. Pt's BP improving.

## 2018-01-04 NOTE — CARE PLAN
Problem: Safety  Goal: Will remain free from falls  Outcome: PROGRESSING AS EXPECTED  Assessing patient for risk factors for falls and implementing fall precautions as needed. Bed alarm is on, bed controls are on and bed is locked in a low position, treaded slipper socks on patient, CLIP    Problem: Infection  Goal: Will remain free from infection  Outcome: PROGRESSING AS EXPECTED  Administering antiinfective (PO Macrodantin) as ordered and assessing for signs and symptoms of improvement    Problem: Knowledge Deficit  Goal: Knowledge of disease process/condition, treatment plan, diagnostic tests, and medications will improve  Outcome: PROGRESSING AS EXPECTED  Diagnosis and POC discussed with patient. Unit routine, MD orders, and medications reviewed. All questions and concerns addressed. Pt verbalizes understanding

## 2018-01-04 NOTE — ASSESSMENT & PLAN NOTE
2/2 past CVA? Has been seen by speech therapy in the past for extended therapies.  Speech therapy following  On a dysphagia diet

## 2018-01-04 NOTE — PROGRESS NOTES
Tele strip at 1904 shows AFIB with a HR of 76.      Measurements: -/.08/-    Tele Shift Summary:    Rhythm : AFIB  Rate : 60s to 90s    Ectopy : Per CCT [place], pt had frequent PVCs    Telemetry monitoring strips placed in pt chart.

## 2018-01-04 NOTE — PROGRESS NOTES
EKG Rhythm Strip    IA Interval: 0  QRS Interval: 0.08  QT Interval: 0  Rhythm Interpretation: ABillyfib

## 2018-01-04 NOTE — ASSESSMENT & PLAN NOTE
EF 50% in 2016, being medically managed but expect this exacerbation is 2/2 poor compliance with medications given difficulty keeping them down, in addition to her recent URI. On room air at home  Speech therapy following  Continue with IV Lasix  - monitor I/Os, daily weights  Follow up echocardiogram results  - continue lisinopril and coreg  - RT to follow, O2 as needed

## 2018-01-04 NOTE — ASSESSMENT & PLAN NOTE
Chronic Afib, continues to be in Afib. Currently rate controlled.  - continue eliquis, per [employer] she has been taking daily instead of BID: We'll discuss this with her caretaker

## 2018-01-04 NOTE — THERAPY
"Speech Language Therapy Clinical Swallow Evaluation completed.  Functional Status: This patient presents with expressive aphasia which she reports is baseline \"since my two strokes.\" However, she reports that she feels her speech has regressed over the past \"four to five months.\" She does not feel that there has been an acute change in the last few days. She has a slight left facial droop which is most noticeable during speech. Laryngeal elevation was palpated as complete. She consumed PO initially without any signs of aspiration, even thin liquid from the straw.   However, at the end of the session she requested hot tea which was presented. Her first sip she started coughing and stated \"it went down this side instead of over here.\" She denied that this happens at home. She took multiple sips from her cup and no further signs of aspiration were noted. She did have belching but denied symptoms of reflux. I recommended that we try some thickened liquid and she adamantly declined \"I was on that stuff for months. I'm not doing that again.\"   Her CxR did not report any infiltrates or consolidations.    She has an obvious aversion to taking pills and did best when they were all crushed into one bite of pudding.   Recommendations - Consider starting Dysphagia 3, with thin liquids (as pt refuses modifications) and implement strict aspiration precautions and swallow strategies. She should also follow GERD precautions due to her large hiatal hernia.                          Strategies: Monitor during meals, Assistance needed for meal tray set-up, No Straws, Head of Bed at 90 Degrees, and keep upright for at least 30 minutes after PO intake.                          Medication Administration: Medication Administration : crush into one bite of pudding  Plan of Care: Will benefit from Speech Therapy 3 times per week for further training in swallow strategies/precautions.  Post-Acute Therapy: Discharge to home with outpatient or " "home health for additional skilled therapy services.    See \"Rehab Therapy-Acute\" Patient Summary Report for complete documentation.   "

## 2018-01-04 NOTE — PROGRESS NOTES
[facility] Hospitalist Progress Note    Date of Service: 2018    Chief Complaint  89 y.o. female admitted 1/3/2018 with shortness of breath and increased lower extremity swelling. Patient states that she may not have been taking her medications that she is having difficulty with swallowing all the meds that she is on. His diagnoses have acute on chronic systolic congestive heart failure    Interval Problem Update  Feels slightly better but still very short of breath  Had a speech eval and did quite well except at the end when she is swallowing tea but she refuses a nectar thick diet  Echocardiogram results pending    Consultants/Specialty  None    Disposition  To be determined        Review of Systems   Constitutional: Positive for malaise/fatigue. Negative for chills and fever.   HENT: Negative for hearing loss and tinnitus.    Respiratory: Positive for shortness of breath. Negative for cough.    Cardiovascular: Negative for chest pain and palpitations.   Gastrointestinal: Negative for nausea and vomiting.   Genitourinary: Negative for dysuria and frequency.   Musculoskeletal: Negative for back pain and myalgias.   Skin: Negative for itching and rash.   Neurological: Negative for dizziness and headaches.   Psychiatric/Behavioral: Negative for depression and hallucinations.      Physical Exam  Laboratory/Imaging   Hemodynamics  Temp (24hrs), Av.4 °C (97.6 °F), Min:36.3 °C (97.3 °F), Max:36.6 °C (97.9 °F)   Temperature: 36.3 °C (97.3 °F)  Pulse  Av.7  Min: 62  Max: 79    Blood Pressure : 125/65, NIBP: 157/78      Respiratory      Respiration: 18, Pulse Oximetry: 96 %, O2 Daily Delivery Respiratory : Nasal Cannula     Given By:: Mouthpiece  RUL Breath Sounds: Clear, RML Breath Sounds: Clear, RLL Breath Sounds: Diminished, PAWAN Breath Sounds: Clear, LLL Breath Sounds: Diminished    Fluids    Intake/Output Summary (Last 24 hours) at 18 1541  Last data filed at 18 1200   Gross per 24 hour   Intake               175 ml   Output              375 ml   Net             -200 ml       Nutrition  Orders Placed This Encounter   Procedures   • DIET ORDER     Standing Status:   Standing     Number of Occurrences:   1     Order Specific Question:   Diet:     Answer:   Cardiac [6]     Order Specific Question:   Texture/Fiber modifications:     Answer:   Dysphagia 3(Mechanical Soft)specify fluid consistency(question 6) [3]     Physical Exam   Constitutional: She is oriented to person, place, and time. No distress.   Appears chronically ill   HENT:   Head: Normocephalic and atraumatic.   Eyes: Conjunctivae are normal. Pupils are equal, round, and reactive to light.   Neck: Normal range of motion. Neck supple. No JVD present.   Cardiovascular: Normal rate.  An irregularly irregular rhythm present.   Murmur heard.  Pulmonary/Chest: Effort normal. She has rales (scattered bilaterally).   Abdominal: Soft. Bowel sounds are normal. She exhibits no distension.   Musculoskeletal: She exhibits edema (1-2+ pitting edema). She exhibits no tenderness.   Neurological: She is alert and oriented to person, place, and time.   Skin: Skin is warm and dry. She is not diaphoretic.   Nursing note and vitals reviewed.      Recent Labs      01/03/18   1214  01/04/18   0520   WBC  6.2  5.7   RBC  4.09*  3.82*   HEMOGLOBIN  9.6*  8.9*   HEMATOCRIT  31.1*  29.4*   MCV  76.0*  77.0*   MCH  23.5*  23.3*   MCHC  30.9*  30.3*   RDW  46.0  47.2   PLATELETCT  265  239   MPV  11.1  10.7     Recent Labs      01/03/18   1214  01/04/18   0520   SODIUM  136  134*   POTASSIUM  4.0  4.1   CHLORIDE  104  103   CO2  25  27   GLUCOSE  101*  108*   BUN  22  24*   CREATININE  0.68  0.84   CALCIUM  8.7  8.3*     Recent Labs      01/03/18   1214   APTT  >240.0*   INR  1.16*     Recent Labs      01/03/18   1214   BNPBTYPENAT  349*              Assessment/Plan     * Acute on chronic systolic congestive heart failure (CMS-HCC)- (present on admission)   Assessment & Plan    EF  50% in 2016, being medically managed but expect this exacerbation is 2/2 poor compliance with medications given difficulty keeping them down, in addition to her recent URI. Unclear if she was hypoxemic, was 93% on 2L NC, no home O2 at baseline.  Speech therapy following  Continue with IV Lasix  - monitor I/Os, daily weights  Follow up echocardiogram results  - continue lisinopril and coreg  - RT to follow, O2 as needed        Difficulty swallowing pills- (present on admission)   Assessment & Plan    2/2 past CVA? Has been seen by speech therapy in the past for extended therapies.  Speech therapy following  On a dysphagia diet        Atrial fibrillation (CMS-[facility])- (present on admission)   Assessment & Plan    Chronic Afib, continues to be in Afib. Currently rate controlled.  - continue eliquis, per [employer] she has been taking daily instead of BID: We'll discuss this with her caretaker            Reviewed items::  Radiology images reviewed, Labs reviewed, Medications reviewed and EKG reviewed  DVT: eliquis.  Antibiotics:  Treating active infection/contamination beyond 24 hours perioperative coverage

## 2018-01-04 NOTE — PROGRESS NOTES
1900: Bedside report received from Teri MARTIN, patient updated about POC and denies any needs or complaints, safety precautions in place    2041: Assessment performed, patient is A&O x 4 at this time, patient denies any pain or discomfort, due medications administered per MAR, patient needs encouragement with PO meds d/t nausea, prn zofran administered prophylactically but patient still having difficulty with pills. Patient up to bathroom with 1 person assist and is now back in bed laying comfortably, bed alarm is on, CLIP    2200: Patient up to bathroom with 1 person assist and had an accident, commode placed in patient room after this. Patient back in bed laying comfortably, bed alarm is on    0000: VS obtained and stable, patient resting on and off in bed with no s/s of distress noted, respirations even and unlabored, safety precautions in place, CLIP    0400: Patient up to bedside commode to void and is now back in bed laying comfortably, VS obtained and stable, bed alarm is on, CLIP    0715: Bedside report given to Teri MARTIN, patient updated about POC and denies any needs or complaints, safety precautions in place, CLIP

## 2018-01-04 NOTE — PROGRESS NOTES
Patient has arrived to the floor, she states feeling fine, except for some fatigue. Admission profile done, assessment performed, patient stable.

## 2018-01-04 NOTE — FLOWSHEET NOTE
01/03/18 1900   Interdisciplinary Plan of Care-Goals (Indications)   Obstructive Ventilatory Defect or Pulmonary Disease without Obvious Obstruction Strong Subjective / Objective Improvement   Interdisciplinary Plan of Care-Outcomes    Bronchodilator Outcome Patient at Stable Baseline   RT Assessment of Delivered Medications   Evaluation of Medication Delivery Daily Yes-- Pt /Family has been Instructed in use of Respiratory Medications and Adverse Reactions   SVN Group   #SVN Performed Yes   Given By: Mouthpiece   Date SVN Last Changed 01/03/18   Date SVN Next Change Due (Q 7 Days) 01/10/18   Respiratory WDL   Respiratory (WDL) X   Chest Exam   Respiration 20   Pulse 72   Breath Sounds   Pre/Post Intervention Post Intervention Assessment   RUL Breath Sounds Clear   RML Breath Sounds Clear   RLL Breath Sounds Diminished   PAWAN Breath Sounds Rhonchi   LLL Breath Sounds Diminished;Rhonchi   Oximetry   #Pulse Oximetry (Single Determination) Yes   Oxygen   Pulse Oximetry 97 %   O2 (LPM) 2   O2 Daily Delivery Respiratory  Nasal Cannula

## 2018-01-05 NOTE — DISCHARGE PLANNING
COURTNEY Howard met with pt to obtain DME choice for O2 ([facility] - [phone]) and HH ([facility]) and requested STAT for O2 as pt is d.c today.

## 2018-01-05 NOTE — DISCHARGE PLANNING
COURTNEY Howard called [facility] and spoke Reagan who said they accepted and will be delivering in 30 minutes.  COURTNEY informed bs MARIO Nelson.

## 2018-01-05 NOTE — FACE TO FACE
Face to Face Note  -  Durable Medical Equipment    Bobby Khan M.D. - NPI: [id number]  I certify that this patient is under my care and that they had a durable medical equipment(DME)face to face encounter by myself that meets the physician DME face-to-face encounter requirements with this patient on:    Date of encounter:   Patient:                    MRN:                       YOB: 2018  Barbara Murray  [id number]  8/10/1928     The encounter with the patient was in whole, or in part, for the following medical condition, which is the primary reason for durable medical equipment:  CHF    I certify that, based on my findings, the following durable medical equipment is medically necessary:  Oxygen.    HOME O2 Saturation Measurements:(Values must be present for Home Oxygen orders)  Room air sat at rest: 97  Room air sat with amb: 83  With liters of O2: 1.5, O2 sat at rest with O2: 98  With Liters of O2: 3, O2 sat with amb with O2 : 93  Is the patient mobile?: Yes    My Clinical findings support the need for the above equipment due to:  Hypoxia    Supporting Symptoms:

## 2018-01-05 NOTE — THERAPY
"Speech Language Therapy dysphagia treatment completed.   Functional Status:  Patient consuming soft solids and thin juice from afternoon meal without signs of aspiration. She was scrunched down in the bed and required verbal reminder/education about HOB positioning appropriate for aspiration precautions and reflux/large hiatal hernia. She has not consumed much due to dislike of meals. Discussed with RD who will work with patient and the menu.   Recommendations: Continue Dysphagia 3, thins. She did best with all pills crushed in 1 spoonful of pudding yesterday but has been declining this process. If patient consumes pills whole please check tissue afterwards and she had spit out the pill on the first attempt. Patient continues to report she has difficulty with pills due to \"psychological thinking.\"    Plan of Care: Will benefit from Speech Therapy 3 times per week  Post-Acute Therapy: Currently anticipate no further skilled therapy needs once patient is discharged from the inpatient setting.    See \"Rehab Therapy-Acute\" Patient Summary Report for complete documentation.     "

## 2018-01-05 NOTE — DISCHARGE PLANNING
Care Transition Team Assessment    IHD met with patient bedside. She stated she lives alone but have caretakers that come by 5-6 days a week. They drive her for appointments and grocery shopping and she uses a walker. She does not use any O2 or other HHC. She does not expect to discharge with any new services.     Information Source  Orientation : Oriented x 4  Information Given By: Patient  Informant's Name: Barbara  Who is responsible for making decisions for patient? : Patient    Readmission Evaluation  Is this a readmission?: No    Elopement Risk  Legal Hold: No  Ambulatory or Self Mobile in Wheelchair: No-Not an Elopement Risk  Elopement Risk: Not at Risk for Elopement    Interdisciplinary Discharge Planning  Does Admitting Nurse Feel This Could be a Complex Discharge?: No  Primary Care Physician: Dr. Mahendra Morrow  Lives with - Patient's Self Care Capacity: Alone and Able to Care For Self (caregivers M-F during the day)  Patient or legal guardian wants to designate a caregiver (see row info): Yes  Caregiver name:  (Adriana Ba)  Caregiver relationship to patient:  (mark)  Caregiver contact info:  ([phone])  Support Systems: Friends / Neighbors  Housing / Facility: [address]  Do You Take your Prescribed Medications Regularly: Yes  Able to Return to Previous ADL's: Yes  Mobility Issues: Yes  Prior Services: senior living Home Aide Services, Housekeeping / Homemaker Services  Assistance Needed: Unknown at this Time  Durable Medical Equipment: Walker    Discharge Preparedness  What is your plan after discharge?: Home with help  What are your discharge supports?: Other (comment) (Friends and caretakers)  Prior Functional Level: Ambulatory, Independent with Activities of Daily Living, Independent with Medication Management, Uses Walker  Difficulity with ADLs: Walking  Difficulty with ADLs Comment: Uses walker  Difficulity with IADLs: Driving, Shopping, Keeping track of finances  Difficulity with IADL  "Comments: Caretakers assist with driving, shopping and \"Yas\" makes sure her bills are paid    Functional Assesment  Prior Functional Level: Ambulatory, Independent with Activities of Daily Living, Independent with Medication Management, Uses Walker    Finances  Financial Barriers to Discharge: No  Prescription Coverage: Yes ([facility] on [address])    Vision / Hearing Impairment  Vision Impairment : Yes  Right Eye Vision: Impaired, Wears Glasses  Left Eye Vision: Impaired, Wears Glasses  Hearing Impairment : No    Values / Beliefs / Concerns  Values / Beliefs Concerns : No    Advance Directive  Advance Directive?: DPOA for Health Care  Durable Power of  Name and Contact : Stephanie Byrne    Domestic Abuse  Have you ever been the victim of abuse or violence?: No  Physical Abuse or Sexual Abuse: No  Verbal Abuse or Emotional Abuse: No  Possible Abuse Reported to:: Not Applicable    Psychological Assessment  History of Substance Abuse: None  History of Psychiatric Problems: No  Non-compliant with Treatment: No  Newly Diagnosed Illness: No    Discharge Risks or Barriers  Discharge risks or barriers?: No    Anticipated Discharge Information  Anticipated discharge disposition: Discharge needs currently unknown  Discharge Address: [address], [place] [address]  Discharge Contact Phone Number: [phone]        "

## 2018-01-05 NOTE — CARE PLAN
Problem: Pain Management  Goal: Pain level will decrease to patient's comfort goal  Outcome: PROGRESSING AS EXPECTED  Pt remains free of pain.  Discussed pharmacological and non pharmacological pain management.  Encouraged pt to verbalize pain.

## 2018-01-05 NOTE — CARE PLAN
Problem: Respiratory:  Goal: Respiratory status will improve    Intervention: Educate and encourage coughing and deep breathing  Encouraged to perform coughing and deep breathing, verbalized understanding.       Problem: Safety  Goal: Will remain free from falls    Intervention: Implement fall precautions  Room/floor clear. Non skid socks on. Proper signs up. Bed alarm on, bed low and locked. Call light and belonging within reach. Hourly rounding in place to make sure needs are met.        Problem: Infection  Goal: Will remain free from infection    Intervention: Implement standard precautions and perform hand washing before and after patient contact  Hand washing every encounter. IV ports scrubbed with alcohol when hanging medicine. Patient watch for s/s of infection. Patient taught to report s/s of infection, verbalized understanding.        Problem: Venous Thromboembolism (VTW)/Deep Vein Thrombosis (DVT) Prevention:  Goal: Patient will participate in Venous Thrombosis (VTE)/Deep Vein Thrombosis (DVT)Prevention Measures    Intervention: Encourage patient to perform ankle flex, foot rotation, and knee flex exercises in addition to other prophylatic measures every hour while awake  Encouraged to perform flexion of the feet while in bed and awake, verbalized understanding.       Problem: Pain Management  Goal: Pain level will decrease to patient's comfort goal    Intervention: Follow pain managment plan developed in collaboration with patient and Interdisciplinary Team  Pain assessment completed. Denies any intolerable discomfort. Encouraged to report, verbalized understanding.

## 2018-01-05 NOTE — CARE PLAN
Problem: Skin Integrity  Goal: Risk for impaired skin integrity will decrease  Outcome: PROGRESSING AS EXPECTED  Pt turns self in bed, at least q2 hrs, pt teaching of active ROM, skin inspected q shift, lines kept clean and dry, pt's linen and gown changed every shift and as needed.

## 2018-01-05 NOTE — PROGRESS NOTES
EKG Rhythm Strip    NJ Interval: 0  QRS Interval: 0.08  QT Interval: 0  Rhythm Interpretation: A.fib    Ectopy: F and O- PVC

## 2018-01-05 NOTE — DISCHARGE SUMMARY
CHIEF COMPLAINT ON ADMISSION  Chief Complaint   Patient presents with   • Shortness of Breath     since this AM   • Leg Swelling     HX of CHF, swelling increased for 2 weeks       CODE STATUS  Partial Code    HPI & HOSPITAL COURSE  This is a 89 y.o. female here withShortness of breath or lower extremity edema. Patient has a history of heart disease and has been on Lasix but has not been taking Lasix as prescribed. Patient reports that she's been having difficulty swallowing pills but she feels that this is more psychological thing because she hates pills and is on 13 pills a day. Nonetheless she presented she states that she had not been taking her Lasix and she feels that the swelling is back because of that. He was admitted to the telemetry unit and was diagnosed as having acute on chronic systolic congestive heart failure.    An echocardiogram was repeated and this is very abnormal. Her heart disease has progressively worsened since her last echocardiogram in 2016. She was requiring supplemental oxygen and this was weaned down by the time of discharge. She still requiring suboccipital auction and home oxygen will be arranged. We continued her home medications and restarted Lasix and she diuresed quite well. By the time of discharge she does feel much better overall. There is some concerns with her daughter having difficulty with swallowing pills but she did fine with a speech eval except with water. He is advised that she be on a dysphagia thickened liquid diet but she refused that. She also remained in atrial ablation but was rate controlled. There is no other intervention of the hospitalization. She is tolerating a diet and ambulating with assistance. She had a stroke in the past and has a caregiver at home and she will be discharged back to home with the assistance of a caregiver.    Therefore, she is discharged in guarded and stable condition with close outpatient follow-up.    SPECIFIC OUTPATIENT  FOLLOW-UP  PCP as soon as possible  Cardiology as soon as possible    DISCHARGE PROBLEM LIST  Principal Problem:    Acute on chronic systolic congestive heart failure (CMS-HCC) POA: Yes  Active Problems:    Moderate to severe pulmonary hypertension POA: Yes    CVA, old, speech/language deficit- 2013 POA: Yes      Overview: August 2013: Left insular cortex acute stroke.    Atrial fibrillation (CMS-HCC) POA: Yes    Difficulty swallowing pills POA: Yes  Resolved Problems:    * No resolved hospital problems. *      FOLLOW UP  Future Appointments  Date Time Provider Department Center   2/16/2018 9:00 AM Mahendra Morrow M.D. [facility] Morrow M.D.  [id number]  [facility] [address]  [phone]            MEDICATIONS ON DISCHARGE   Barbara Murray   Home Medication Instructions RACQUEL:[id number]    Printed on:01/05/18 1403   Medication Information                      apixaban (ELIQUIS) 2.5mg Tab  Take 1 Tab by mouth 2 Times a Day.             betaxolol (BETOPTIC-S) 0.25 % SUSP  Place 1 Drop in both eyes every day.             carvedilol (COREG) 6.25 MG Tab  Take 1 Tab by mouth every day.             duloxetine (CYMBALTA) 20 MG Cap DR Particles  Take 1 Cap by mouth every day.             furosemide (LASIX) 20 MG Tab  Take 1 Tab by mouth every day.             latanoprost (XALATAN) 0.005 % SOLN  Place 1 Drop in both eyes every evening.             lisinopril (PRINIVIL) 5 MG Tab  Take 1 Tab by mouth every day.             oxybutynin (DITROPAN) 5 MG Tab  Take 1 Tab by mouth every day.             ropinirole (REQUIP) 1 MG Tab  Take 1 Tab by mouth 3 times a day.             rosuvastatin (CRESTOR) 20 MG Tab  TAKE 1 TABLET ORALLY DAILY EVERY EVENING             tramadol (ULTRAM-ER) 100 MG tablet  Take 100 mg by mouth 1 time daily as needed.                 DIET  Orders Placed This Encounter   Procedures   • DIET ORDER     Standing Status:   Standing     Number of Occurrences:   1     Order Specific Question:    Diet:     Answer:   Cardiac [6]     Order Specific Question:   Texture/Fiber modifications:     Answer:   Dysphagia 3(Mechanical Soft)specify fluid consistency(question 6) [3]       ACTIVITY  As tolerated.  Weight bearing as tolerated      CONSULTATIONS  None    PROCEDURES  None    LABORATORY  Lab Results   Component Value Date/Time    SODIUM 132 (L) 01/05/2018 05:18 AM    POTASSIUM 3.9 01/05/2018 05:18 AM    CHLORIDE 98 01/05/2018 05:18 AM    CO2 28 01/05/2018 05:18 AM    GLUCOSE 99 01/05/2018 05:18 AM    BUN 19 01/05/2018 05:18 AM    CREATININE 0.88 01/05/2018 05:18 AM    CREATININE 1.01 (H) 05/03/2010 12:03 PM    GLOMRATE 53 (L) 05/03/2010 12:03 PM        Lab Results   Component Value Date/Time    WBC 6.0 01/05/2018 05:18 AM    WBC 4.4 05/03/2010 12:03 PM    HEMOGLOBIN 9.2 (L) 01/05/2018 05:18 AM    HEMATOCRIT 30.9 (L) 01/05/2018 05:18 AM    PLATELETCT 269 01/05/2018 05:18 AM        Total time of the discharge process exceeds 40 minutes

## 2018-01-05 NOTE — PROGRESS NOTES
Pt's tele summary: A-Fib w/occasional PVC's.      HR 69      CO interval ---  QRS complex 0.08  QT interval ---      This tele strip placed in pt's chart.

## 2018-01-06 NOTE — DISCHARGE INSTRUCTIONS
Discharge Instructions    Discharged to home by car with escort. Discharged via wheelchair, hospital escort: Yes.  Special equipment needed: Oxygen    Be sure to schedule a follow-up appointment with your primary care doctor or any specialists as instructed.     Discharge Plan:   Diet Plan: Discussed  Activity Level: Discussed  Confirmed Follow up Appointment: Patient to Call and Schedule Appointment  Confirmed Symptoms Management: Discussed  Medication Reconciliation Updated: Yes  Influenza Vaccine Indication: Not indicated: Previously immunized this influenza season and > 8 years of age    I understand that a diet low in cholesterol, fat, and sodium is recommended for good health. Unless I have been given specific instructions below for another diet, I accept this instruction as my diet prescription.   Other diet: cardiac    Special Instructions: None    · Is patient discharged on Warfarin / Coumadin?   No     · Is patient Post Blood Transfusion?  No    Depression / Suicide Risk    As you are discharged from this [facility] facility, it is important to learn how to keep safe from harming yourself.    Recognize the warning signs:  · Abrupt changes in personality, positive or negative- including increase in energy   · Giving away possessions  · Change in eating patterns- significant weight changes-  positive or negative  · Change in sleeping patterns- unable to sleep or sleeping all the time   · Unwillingness or inability to communicate  · Depression  · Unusual sadness, discouragement and loneliness  · Talk of wanting to die  · Neglect of personal appearance   · Rebelliousness- reckless behavior  · Withdrawal from people/activities they love  · Confusion- inability to concentrate     If you or a loved one observes any of these behaviors or has concerns about self-harm, here's what you can do:  · Talk about it- your feelings and reasons for harming yourself  · Remove any means that you might use to hurt yourself  (examples: pills, rope, extension cords, firearm)  · Get professional help from the community (Mental Health, Substance Abuse, psychological counseling)  · Do not be alone:Call your Safe Contact- someone whom you trust who will be there for you.  · Call your local CRISIS HOTLINE 236-5291 or 831-902-9961  · Call your local Children's Mobile Crisis Response Team [place] (304) 983-1198 or www.[employer]  · Call the toll free National Suicide Prevention Hotlines   · National Suicide Prevention Lifeline 791-649-RLQS (4112)  · [employer] Hope Line Network 800-SUICIDE (324-8887)          Heart Failure  Heart failure is a condition in which the heart has trouble pumping blood. This means your heart does not pump blood efficiently for your body to work well. In some cases of heart failure, fluid may back up into your lungs or you may have swelling (edema) in your lower legs. Heart failure is usually a long-term (chronic) condition. It is important for you to take good care of yourself and follow your health care provider's treatment plan.  CAUSES   Some health conditions can cause heart failure. Those health conditions include:  · High blood pressure (hypertension). Hypertension causes the heart muscle to work harder than normal. When pressure in the blood vessels is high, the heart needs to pump (contract) with more force in order to circulate blood throughout the body. High blood pressure eventually causes the heart to become stiff and weak.  · Coronary artery disease (CAD). CAD is the buildup of cholesterol and fat (plaque) in the arteries of the heart. The blockage in the arteries deprives the heart muscle of oxygen and blood. This can cause chest pain and may lead to a heart attack. High blood pressure can also contribute to CAD.  · Heart attack (myocardial infarction). A heart attack occurs when one or more arteries in the heart become blocked. The loss of oxygen damages the muscle tissue of the heart. When  this happens, part of the heart muscle dies. The injured tissue does not contract as well and weakens the heart's ability to pump blood.  · Abnormal heart valves. When the heart valves do not open and close properly, it can cause heart failure. This makes the heart muscle pump harder to keep the blood flowing.  · Heart muscle disease (cardiomyopathy or myocarditis). Heart muscle disease is damage to the heart muscle from a variety of causes. These can include drug or alcohol abuse, infections, or unknown reasons. These can increase the risk of heart failure.  · Lung disease. Lung disease makes the heart work harder because the lungs do not work properly. This can cause a strain on the heart, leading it to fail.  · Diabetes. Diabetes increases the risk of heart failure. High blood sugar contributes to high fat (lipid) levels in the blood. Diabetes can also cause slow damage to tiny blood vessels that carry important nutrients to the heart muscle. When the heart does not get enough oxygen and food, it can cause the heart to become weak and stiff. This leads to a heart that does not contract efficiently.  · Other conditions can contribute to heart failure. These include abnormal heart rhythms, thyroid problems, and low blood counts (anemia).  Certain unhealthy behaviors can increase the risk of heart failure, including:  · Being overweight.  · Smoking or chewing tobacco.  · Eating foods high in fat and cholesterol.  · Abusing illicit drugs or alcohol.  · Lacking physical activity.  SYMPTOMS   Heart failure symptoms may vary and can be hard to detect. Symptoms may include:  · Shortness of breath with activity, such as climbing stairs.  · Persistent cough.  · Swelling of the feet, ankles, legs, or abdomen.  · Unexplained weight gain.  · Difficulty breathing when lying flat (orthopnea).  · Waking from sleep because of the need to sit up and get more air.  · Rapid heartbeat.  · Fatigue and loss of energy.  · Feeling  light-headed, dizzy, or close to fainting.  · Loss of appetite.  · Nausea.  · Increased urination during the night (nocturia).  DIAGNOSIS   A diagnosis of heart failure is based on your history, symptoms, physical examination, and diagnostic tests. Diagnostic tests for heart failure may include:  · Echocardiography.  · Electrocardiography.  · Chest X-ray.  · Blood tests.  · Exercise stress test.  · Cardiac angiography.  · Radionuclide scans.  TREATMENT   Treatment is aimed at managing the symptoms of heart failure. Medicines, behavioral changes, or surgical intervention may be necessary to treat heart failure.  · Medicines to help treat heart failure may include:  ¨ Angiotensin-converting enzyme (ACE) inhibitors. This type of medicine blocks the effects of a blood protein called angiotensin-converting enzyme. ACE inhibitors relax (dilate) the blood vessels and help lower blood pressure.  ¨ Angiotensin receptor blockers (ARBs). This type of medicine blocks the actions of a blood protein called angiotensin. Angiotensin receptor blockers dilate the blood vessels and help lower blood pressure.  ¨ Water pills (diuretics). Diuretics cause the kidneys to remove salt and water from the blood. The extra fluid is removed through urination. This loss of extra fluid lowers the volume of blood the heart pumps.  ¨ Beta blockers. These prevent the heart from beating too fast and improve heart muscle strength.  ¨ Digitalis. This increases the force of the heartbeat.  · Healthy behavior changes include:  ¨ Obtaining and maintaining a healthy weight.  ¨ Stopping smoking or chewing tobacco.  ¨ Eating heart-healthy foods.  ¨ Limiting or avoiding alcohol.  ¨ Stopping illicit drug use.  ¨ Physical activity as directed by your health care provider.  · Surgical treatment for heart failure may include:  ¨ A procedure to open blocked arteries, repair damaged heart valves, or remove damaged heart muscle tissue.  ¨ A pacemaker to improve heart  muscle function and control certain abnormal heart rhythms.  ¨ An internal cardioverter defibrillator to treat certain serious abnormal heart rhythms.  ¨ A left ventricular assist device (LVAD) to assist the pumping ability of the heart.  HOME CARE INSTRUCTIONS   · Take medicines only as directed by your health care provider. Medicines are important in reducing the workload of your heart, slowing the progression of heart failure, and improving your symptoms.  ¨ Do not stop taking your medicine unless directed by your health care provider.  ¨ Do not skip any dose of medicine.  ¨ Refill your prescriptions before you run out of medicine. Your medicines are needed every day.  · Engage in moderate physical activity if directed by your health care provider. Moderate physical activity can benefit some people. The elderly and people with severe heart failure should consult with a health care provider for physical activity recommendations.  · Eat heart-healthy foods. Food choices should be free of trans fat and low in saturated fat, cholesterol, and salt (sodium). Healthy choices include fresh or frozen fruits and vegetables, fish, lean meats, legumes, fat-free or low-fat dairy products, and whole grain or high fiber foods. Talk to a dietitian to learn more about heart-healthy foods.  · Limit sodium if directed by your health care provider. Sodium restriction may reduce symptoms of heart failure in some people. Talk to a dietitian to learn more about heart-healthy seasonings.  · Use healthy cooking methods. Healthy cooking methods include roasting, grilling, broiling, baking, poaching, steaming, or stir-frying. Talk to a dietitian to learn more about healthy cooking methods.  · Limit fluids if directed by your health care provider. Fluid restriction may reduce symptoms of heart failure in some people.  · Weigh yourself every day. Daily weights are important in the early recognition of excess fluid. You should weigh yourself  every morning after you urinate and before you eat breakfast. Wear the same amount of clothing each time you weigh yourself. Record your daily weight. Provide your health care provider with your weight record.  · Monitor and record your blood pressure if directed by your health care provider.  · Check your pulse if directed by your health care provider.  · Lose weight if directed by your health care provider. Weight loss may reduce symptoms of heart failure in some people.  · Stop smoking or chewing tobacco. Nicotine makes your heart work harder by causing your blood vessels to constrict. Do not use nicotine gum or patches before talking to your health care provider.  · Keep all follow-up visits as directed by your health care provider. This is important.  · Limit alcohol intake to no more than 1 drink per day for nonpregnant women and 2 drinks per day for men. One drink equals 12 ounces of beer, 5 ounces of wine, or 1½ ounces of hard liquor. Drinking more than that is harmful to your heart. Tell your health care provider if you drink alcohol several times a week. Talk with your health care provider about whether alcohol is safe for you. If your heart has already been damaged by alcohol or you have severe heart failure, drinking alcohol should be stopped completely.  · Stop illicit drug use.  · Stay up-to-date with immunizations. It is especially important to prevent respiratory infections through current pneumococcal and influenza immunizations.  · Manage other health conditions such as hypertension, diabetes, thyroid disease, or abnormal heart rhythms as directed by your health care provider.  · Learn to manage stress.  · Plan rest periods when fatigued.  · Learn strategies to manage high temperatures. If the weather is extremely hot:  ¨ Avoid vigorous physical activity.  ¨ Use air conditioning or fans or seek a cooler location.  ¨ Avoid caffeine and alcohol.  ¨ Wear loose-fitting, lightweight, and light-colored  clothing.  · Learn strategies to manage cold temperatures. If the weather is extremely cold:  ¨ Avoid vigorous physical activity.  ¨ Layer clothes.  ¨ Wear mittens or gloves, a hat, and a scarf when going outside.  ¨ Avoid alcohol.  · Obtain ongoing education and support as needed.  · Participate in or seek rehabilitation as needed to maintain or improve independence and quality of life.  SEEK MEDICAL CARE IF:   · You have a rapid weight gain.  · You have increasing shortness of breath that is unusual for you.  · You are unable to participate in your usual physical activities.  · You tire easily.  · You cough more than normal, especially with physical activity.  · You have any or more swelling in areas such as your hands, feet, ankles, or abdomen.  · You are unable to sleep because it is hard to breathe.  · You feel like your heart is beating fast (palpitations).  · You become dizzy or light-headed upon standing up.  SEEK IMMEDIATE MEDICAL CARE IF:   · You have difficulty breathing.  · There is a change in mental status such as decreased alertness or difficulty with concentration.  · You have a pain or discomfort in your chest.  · You have an episode of fainting (syncope).  MAKE SURE YOU:   · Understand these instructions.  · Will watch your condition.  · Will get help right away if you are not doing well or get worse.     This information is not intended to replace advice given to you by your health care provider. Make sure you discuss any questions you have with your health care provider.     Document Released: 12/18/2006 Document Revised: 05/03/2016 Document Reviewed: 01/17/2014  [employer] Patient Education ©2016 [employer]

## 2018-01-06 NOTE — PROGRESS NOTES
Received bedside report from [facility] nurseArnold.  Discussed POC.  Pt sleeping in bed, high fowlers, no s/s of acute distress, respirations even and unlabored, on 1.5 L of oxygen, personal belongings w/in reach, safety precautions in place, assumed pt care, will monitor.

## 2018-01-06 NOTE — PROGRESS NOTES
Gave report to MARIO Nelson. Plan of care discussed. Safety precautions in place. Belongings and call light with in reach. Patient resting in bed with eyes closed. Respirations equal and unlabored. .

## 2018-01-06 NOTE — PROGRESS NOTES
Gave bedside report to [facility] nurseArnold.  Discussed POC.  Pt resting in bed, sleeping,  high fowlers, no s/s of acute distress, personal belongings w/in reach, safety precautions in place.

## 2018-01-06 NOTE — PROGRESS NOTES
Pt discharged home w/all personal belongings.  VSS and all lines discharged.  Pt discharged home w/care taker (Adriana Ba), HH, and home O2.  Pt's care taker declined to have this RN schedule f/up appt for pt w/pcp, caretaker choose to schedule herself.  Pt verbalized understanding of all discharged instructions and all pt questions were answered.

## 2018-01-06 NOTE — PROGRESS NOTES
Received bedside report from MARIO Nelson. Plan of care discussed. Safety precautions in place. Call light and personal belongings within reach. Patient has no needs at this time. Patient reminded to use call light for assistance.

## 2018-01-06 NOTE — PROGRESS NOTES
Completed assessment and administered scheduled medications - see MAR. Patient ambulated to bedside commode with front wheel walker. Patient unsteady. Provided patient with warm blanket. Patient has no additional needs at this time. Bed in low position, wheels locked, and call light within reach.

## 2018-01-06 NOTE — PROGRESS NOTES
Pt sleeping in bed, high fowlers, respirations even and unlabored, on 1.5 L of oxygen via NC, no s/s of acute distress, personal belongings w/in reach, safety precautions in place, will monitor.

## 2018-01-06 NOTE — PROGRESS NOTES
[facility] Hospitalist Progress Note    Date of Service: 2018    Chief Complaint  89 y.o. female admitted 1/3/2018 with shortness of breath and increased lower extremity swelling. Patient states that she may not have been taking her medications that she is having difficulty with swallowing all the meds that she is on. His diagnoses have acute on chronic systolic congestive heart failure    Interval Problem Update  Feels much better next shortness of breath has nearly resolved  Being weaned off of oxygen but still requiring 1 L of supplemental oxygen  No issues overnight  Wants to go home    Consultants/Specialty  None    Disposition  To be determined        Review of Systems   Constitutional: Positive for malaise/fatigue. Negative for chills and fever.   HENT: Negative for hearing loss and sore throat.    Respiratory: Negative for cough and shortness of breath.    Cardiovascular: Negative for chest pain and palpitations.   Gastrointestinal: Negative for nausea and vomiting.   Genitourinary: Negative for dysuria and frequency.   Musculoskeletal: Negative for back pain and myalgias.   Skin: Negative for itching and rash.   Neurological: Negative for dizziness and headaches.   Psychiatric/Behavioral: Negative for depression and hallucinations.      Physical Exam  Laboratory/Imaging   Hemodynamics  Temp (24hrs), Av.9 °C (98.4 °F), Min:36.8 °C (98.2 °F), Max:36.9 °C (98.5 °F)   Temperature: 36.8 °C (98.2 °F)  Pulse  Av.6  Min: 59  Max: 79    Blood Pressure : 115/51      Respiratory      Respiration: 18, Pulse Oximetry: 97 %, O2 Daily Delivery Respiratory : Nasal Cannula        RUL Breath Sounds: Clear, RML Breath Sounds: Clear, RLL Breath Sounds: Diminished;Fine Crackles, PAWAN Breath Sounds: Clear, LLL Breath Sounds: Diminished;Fine Crackles    Fluids    Intake/Output Summary (Last 24 hours) at 18 1640  Last data filed at 18 0916   Gross per 24 hour   Intake              175 ml   Output               350 ml   Net             -175 ml       Nutrition  Orders Placed This Encounter   Procedures   • DIET ORDER     Standing Status:   Standing     Number of Occurrences:   1     Order Specific Question:   Diet:     Answer:   Cardiac [6]     Order Specific Question:   Texture/Fiber modifications:     Answer:   Dysphagia 3(Mechanical Soft)specify fluid consistency(question 6) [3]     Physical Exam   Constitutional: She is oriented to person, place, and time. No distress.   Appears chronically ill   HENT:   Head: Normocephalic and atraumatic.   Mouth/Throat: No oropharyngeal exudate.   Eyes: Conjunctivae and EOM are normal. Pupils are equal, round, and reactive to light.   Neck: Normal range of motion. Neck supple. No JVD present.   Cardiovascular: Normal rate.  An irregularly irregular rhythm present.   Murmur heard.  Pulmonary/Chest: Effort normal. No respiratory distress. She has decreased breath sounds in the right lower field and the left lower field. She has no wheezes. She has no rales.   Abdominal: Soft. Bowel sounds are normal. She exhibits no distension.   Musculoskeletal: She exhibits edema (1+ pitting edema). She exhibits no tenderness.   Neurological: She is alert and oriented to person, place, and time.   Skin: Skin is warm and dry. She is not diaphoretic. No erythema.   Nursing note and vitals reviewed.      Recent Labs      01/03/18   1214  01/04/18 0520 01/05/18 0518   WBC  6.2  5.7  6.0   RBC  4.09*  3.82*  3.96*   HEMOGLOBIN  9.6*  8.9*  9.2*   HEMATOCRIT  31.1*  29.4*  30.9*   MCV  76.0*  77.0*  78.0*   MCH  23.5*  23.3*  23.2*   MCHC  30.9*  30.3*  29.8*   RDW  46.0  47.2  46.6   PLATELETCT  265  239  269   MPV  11.1  10.7  11.3     Recent Labs      01/03/18   1214  01/04/18 0520 01/05/18 0518   SODIUM  136  134*  132*   POTASSIUM  4.0  4.1  3.9   CHLORIDE  104  103  98   CO2  25  27  28   GLUCOSE  101*  108*  99   BUN  22  24*  19   CREATININE  0.68  0.84  0.88   CALCIUM  8.7  8.3*  8.1*      Recent Labs      01/03/18   1214   APTT  >240.0*   INR  1.16*     Recent Labs      01/03/18   1214   BNPBTYPENAT  349*              Assessment/Plan     * Acute on chronic systolic congestive heart failure (CMS-[facility])- (present on admission)   Assessment & Plan    EF 50% in 2016, being medically managed but expect this exacerbation is 2/2 poor compliance with medications given difficulty keeping them down, in addition to her recent URI. On room air at home  Speech therapy following  Continue with IV Lasix  - monitor I/Os, daily weights  Follow up echocardiogram results  - continue lisinopril and coreg  - RT to follow, O2 as needed        Difficulty swallowing pills- (present on admission)   Assessment & Plan    2/2 past CVA? Has been seen by speech therapy in the past for extended therapies.  Speech therapy following  On a dysphagia diet        Atrial fibrillation (CMS-[facility])- (present on admission)   Assessment & Plan    Chronic Afib, continues to be in Afib. Currently rate controlled.  - continue eliquis, per [employer] she has been taking daily instead of BID: We'll discuss this with her caretaker            Reviewed items::  Radiology images reviewed, Labs reviewed and Medications reviewed  DVT: eliquis.  Antibiotics:  Treating active infection/contamination beyond 24 hours perioperative coverage

## 2018-01-06 NOTE — CARE PLAN
Problem: Safety  Goal: Will remain free from injury  Outcome: PROGRESSING AS EXPECTED  Patient will use call light appropriately and assistance will be provided. Personal belongings and call light within reach. Bed in low position, locked and alarm set.      Problem: Infection  Goal: Will remain free from infection  Outcome: PROGRESSING AS EXPECTED  Assess and monitor for signs and symptoms of infection. Perform hand hygiene before and after patient contact.

## 2018-01-06 NOTE — PROGRESS NOTES
Administered prescribed meds per MAR.  Assessed pt, discussed POC, updated communication board.  Pt resting in bed, high fowlers, eating breakfast tray, poor appetite,  encouraged pt to eat, denies pain/discomfort/any needs at this time, fatigue, no s/s of acute distress, all lines patent, respirations even and unlabored, on 1.5 L of oxygen via NC, advised pt to use call light for assistance, personal belongings w/in reach, safety precautions in place, room is clutter free, will ctm.

## 2018-01-06 NOTE — PROGRESS NOTES
Pt's tele summary: A-Fib w/rare PVC's.      HR 63      Average HR 63's to 74's.    WY interval --  QRS complex 0.08  QT interval 0.40      This tele strip placed in pt's chart.

## 2018-01-06 NOTE — CARE PLAN
Problem: Urinary Elimination:  Goal: Ability to reestablish a normal urinary elimination pattern will improve  Outcome: PROGRESSING AS EXPECTED  Pt calls approprietly when the need for urination is present.  Denies any difficulty in urinating, mild incontinence.    Problem: Mobility  Goal: Risk for activity intolerance will decrease  Outcome: PROGRESSING AS EXPECTED  Provided pt teaching about active ROM exercises.  Encouraged pt turn in bed frequently.

## 2018-01-11 PROBLEM — R06.02 SOB (SHORTNESS OF BREATH): Status: ACTIVE | Noted: 2018-01-01

## 2018-01-11 NOTE — ASSESSMENT & PLAN NOTE
The patient is an 89-year-old female who presents to clinic for a posthospitalization follow-up. She was recently admitted for acute on chronic systolic heart failure. She presented with worsening of shortness of breath and increased peripheral edema, it was believed to be due to noncompliance with Lasix. She states she's having difficulty swallowing those large pills so she stopped. In the hospital she was adequately diuresed and her symptoms improved so she was discharged with instructions to follow up with her primary care physician.    Lives alone with dog in [place]  All of family is dead  She has no children  She has 3 caregivers at home   But is alone at night

## 2018-01-11 NOTE — PROGRESS NOTES
Referral from [facility]. Patient has not been compliant with medications. Outbound call to patient to confirm adherence to current medication regimen. Patient's medications are managed by caregiver, Adriana. Adriana states that Barbara is reluctant to take pills. She reports that patient's medications are regurgitated shortly after she takes them. Adriana reports that patient takes 3-5 out of 13 prescribed pills daily. While inpatient, Adriana states that Barbara's pills were crushed and administered in pudding. Patient is not agreeing to this process while at home. Caregiver states that she emphasizes the indications for the medications and consequences of not taking them to [place]. Discussed recommended administration techniques to caregiver. Most of her meds may be crushed and given immediately in pudding or applesauce, with the exceptions of: duloxetine which can be opened and sprinkled on food, but the pellets themselves may not be crushed; rosuvastatin may not be crushed, and Ultram ER 100mg may not be crushed (IR 50mg tabs may be crushed).    Encouraged Adriana to purchase a pill  for Barbara. Adriana also states that Barbara is more likely to comply in the presence of a doctor or nurse. I will discuss this process with  RN and see if one administration can be completed at home in order to reinforce the importance of compliance and to assist caregiver with any questions during administration.

## 2018-01-12 NOTE — PROGRESS NOTES
This medical record contains text that has been entered with the assistance of computer voice recognition and dictation software.  Therefore, it may contain unintended errors in text, spelling, punctuation, or grammar    Chief Complaint   Patient presents with   • Hospital Follow-up       Barbara Murray is a 89 y.o. female here evaluation and management of: Recent hospitalization, atrial fibrillation, hypertension      HPI:     Chronic systolic heart failure (CMS-HCC)- EF=50%, Nov., 2016 ECHO- dr scruggs  The patient is an 89-year-old female who presents to clinic for a posthospitalization follow-up. She was recently admitted for acute on chronic systolic heart failure. She presented with worsening of shortness of breath and increased peripheral edema, it was believed to be due to noncompliance with Lasix. She states she's having difficulty swallowing those large pills so she stopped. In the hospital she was adequately diuresed and her symptoms improved so she was discharged with instructions to follow up with her primary care physician.    Lives alone with dog in [place]  All of family is dead  She has no children  She has 3 caregivers at home   But is alone at night    Atrial fibrillation (CMS-HCC)  Eliquis  2.5 mg by mouth twice a day  She is on a calcium channel blocker    She denies any chest pain no severe headaches no new shortness of breath she has underlying shortness of breath due to congestive heart failure. She has underlying dyspnea on exertion which is her baseline. She denies any chest pain or severe headaches no changes in vision or numbness or tingling anywhere.    Essential hypertension  Carvedilol 6.25 mg by mouth twice a day    She is on anticoagulation with Eliquis  She is on moderate potency statin therapy    The patient has been tollerating the BP meds without any issues. No tunnel vision, no cough, no changes in vision, no lightheadedness, no fatigue, no syncopal or presyncopal  episodes, no edema, no new rashes.     Current medicines (including changes today)  Current Outpatient Prescriptions   Medication Sig Dispense Refill   • Misc. Devices Misc PLEASE PROVIDE PATIENT WITH PORTABLE OXYGEN TANK, ALONG WITH ALL ACCESSORIES REQUIRED 1 Application 1   • non-formulary med Spray 3 L/min in nose Continuous.     • tramadol (ULTRAM) 50 MG Tab Take 50 mg by mouth 1 time daily as needed for Moderate Pain (breakthrough pain).     • timolol PF (TIMOPTIC PF) 0.5 % Solution Place 1 Drop in both eyes 2 Times a Day.     • travoprost (TRAVATAN Z) 0.004 % Solution Place 1 Drop in both eyes 2 Times a Day.     • duloxetine (CYMBALTA) 20 MG Cap DR Particles Take 1 Cap by mouth every day. (Patient taking differently: Take 20 mg by mouth 2 times a day.) 30 Cap 11   • tramadol (ULTRAM-ER) 100 MG tablet Take 100 mg by mouth 1 time daily as needed.  1   • carvedilol (COREG) 6.25 MG Tab Take 1 Tab by mouth every day.     • furosemide (LASIX) 20 MG Tab Take 1 Tab by mouth every day.     • oxybutynin (DITROPAN) 5 MG Tab Take 1 Tab by mouth every day.     • apixaban (ELIQUIS) 2.5mg Tab Take 1 Tab by mouth 2 Times a Day. 180 Tab 3   • rosuvastatin (CRESTOR) 20 MG Tab TAKE 1 TABLET ORALLY DAILY EVERY EVENING 90 Tab 3   • lisinopril (PRINIVIL) 5 MG Tab Take 1 Tab by mouth every day. 90 Tab 4   • ropinirole (REQUIP) 1 MG Tab Take 1 Tab by mouth 3 times a day. 90 Tab 11     No current facility-administered medications for this visit.      She  has a past medical history of Abnormal EKG; Anesthesia; Arrest of bone development or growth; Arrhythmia; Arthritis; Atrial fibrillation (CMS-HCC) (September 2014); Backpain; CAD (coronary artery disease) (August 2013); CATARACT; Cellulitis and abscess of upper arm and forearm; CHF (congestive heart failure) (CMS-HCC) (5/11/2015); Chronic UTI (urinary tract infection) (3/30/2017); Coronary heart disease; Crohns disease (CMS-HCC); Degeneration of lumbar or lumbosacral intervertebral  disc; Disorder of bone and cartilage, unspecified; Disorders of bursae and tendons in shoulder region, unspecified; Glaucoma; Heart burn; Hiatus hernia syndrome; High cholesterol; Hyperlipidemia; Hypertension; Hypertonicity of bladder; MI (myocardial infarction) ( ); Osteoporosis, unspecified; Other extrapyramidal disease and abnormal movement disorder; Other malaise and fatigue; Other specified disorder of intestines; Pain; Pain; Pneumonia; Reflux esophagitis; Restless leg syndrome; Rheumatoid arthritis (CMS-HCC); Rotator cuff (capsule) sprain; Severe tricuspid regurgitation by prior echocardiogram (11/5/2016); Stroke (CMS-HCC) (August 2013); Symptomatic menopausal or female climacteric states; Unspecified hemorrhagic conditions; and Unspecified urinary incontinence. She also has no past medical history of Angina; ASTHMA; Bronchitis; Cancer (CMS-HCC); COPD; Diabetes; Dialysis; Fall; Heart murmur; Jaundice; Other specified symptom associated with female genital organs; Pacemaker; Personal history of venous thrombosis and embolism; Psychiatric problem; Rheumatic fever; Seizure (CMS-HCC); or Unspecified disorder of thyroid.  She  has a past surgical history that includes tonsillectomy (1952); paulino by laparoscopy (1994); cataract phaco with iol (2007); rotator cuff repair (Right, 2008); other; other (1935); other; knee arthroplasty total (6/21/2010); inguinal hernia repair bilateral (3/2/2011); gastroscopy with balloon dilatation (7/25/2013); carpal tunnel release (Right, 10/20/2015); trigger finger release (Right, 10/20/2015); cholecystectomy; pr remv 2nd cataract,corn-scler sectn; and arthroscopy, knee.  Social History   Substance Use Topics   • Smoking status: Former Smoker     Years: 2.00     Types: Cigarettes     Start date: 1/1/1956     Quit date: 11/19/1958   • Smokeless tobacco: Never Used      Comment: .5 ppd 9 yrs,quit 1966   • Alcohol use Yes      Comment: 1 per week     Social History     Social History  "Narrative   • No narrative on file     Family History   Problem Relation Age of Onset   • Heart Disease Mother    • Heart Failure Mother    • Lung Disease Father    • Stroke Sister      Family Status   Relation Status   • Mother    • Father     TB   • Sister    • Brother    • Brother     MVA         ROS    Please see hpi     All other systems reviewed and are negative     Objective:     Blood pressure 118/72, pulse 64, temperature 37.2 °C (99 °F), height 1.52 m (4' 11.84\"), weight 51.3 kg (113 lb), SpO2 90 %. Body mass index is 22.18 kg/m².  Physical Exam:    Constitutional: Alert, no distress.  Skin: Warm, dry, good turgor, no rashes in visible areas.  Eye: Equal, round and reactive, conjunctiva clear, lids normal.  ENMT: Lips without lesions, good dentition, oropharynx clear.  Neck: Trachea midline, no masses, no thyromegaly. No cervical or supraclavicular lymphadenopathy.  Respiratory: Unlabored respiratory effort, lungs clear to auscultation, no wheezes, no ronchi.  Cardiovascular: Normal S1, S2, no murmur, 3+ pitting edema bilaterally up to knees, no HJR, no JVD  Abdomen: Soft, non-tender, no masses, no hepatosplenomegaly.  Psych: Alert and oriented x3, normal affect and mood.          Assessment and Plan:   The following treatment plan was discussed      1. Chronic systolic heart failure (CMS-HCC)- EF=50%, 2016 ECHO- dr scruggs    Overall her condition has improved  However based on record review and physical exam overall prognosis is poor. We did discuss the option of hospice/nursing home however she refused adamantly. I believe she is coherent enough to make her own decisions at this time. She will discuss in further detail with her primary care physician.    The patient was educated on the importance of  daily weights  Limit fluid intake 2L per day   Limit Sodium intake <2 grams  Continue current regimen  Continue cardiac risk reduction      - Misc. Devices " Misc; PLEASE PROVIDE PATIENT WITH PORTABLE OXYGEN TANK, ALONG WITH ALL ACCESSORIES REQUIRED  Dispense: 1 Application; Refill: 1        2. Essential hypertension  Patient has been stable with current management  We will make no changes for now      3. Chronic atrial fibrillation (CMS-HCC)  Patient has been stable with current management  We will make no changes for now          HEALTH MAINTENANCE:    Instructed to Follow up in clinic or ER for worsening symptoms, difficulty breathing, lack of expected recovery, or should new symptoms or problems arise.    Followup: Return in about 4 weeks (around 2/8/2018) for Reevaluation, With PCP.       Once again this medical record contains text that has been entered with the assistance of computer voice recognition and dictation software.  Therefore, it may contain unintended errors in text, spelling, punctuation, or grammar

## 2018-01-12 NOTE — ASSESSMENT & PLAN NOTE
Eliquis  2.5 mg by mouth twice a day  She is on a calcium channel blocker    She denies any chest pain no severe headaches no new shortness of breath she has underlying shortness of breath due to congestive heart failure. She has underlying dyspnea on exertion which is her baseline. She denies any chest pain or severe headaches no changes in vision or numbness or tingling anywhere.

## 2018-01-12 NOTE — ASSESSMENT & PLAN NOTE
Carvedilol 6.25 mg by mouth twice a day    She is on anticoagulation with Eliquis  She is on moderate potency statin therapy    The patient has been tollerating the BP meds without any issues. No tunnel vision, no cough, no changes in vision, no lightheadedness, no fatigue, no syncopal or presyncopal episodes, no edema, no new rashes.

## 2018-01-24 NOTE — ED NOTES
Pt to ED via ambulance from home. States she has swelling in her legs and they are achy. Also c/o bloody nose last night that lasted approximately 10 min. Pt uses O2 at home 3 L continuously. Denies SOB.

## 2018-01-24 NOTE — ED NOTES
Chief Complaint   Patient presents with   • Leg Swelling     bilat, Hx of same, states swelling is getting worse     BP (!) 169/105   Pulse 87   Temp 36.8 °C (98.2 °F)   Resp 18   Ht 1.524 m (5')   Wt 54.4 kg (120 lb)   SpO2 96%   BMI 23.44 kg/m²     Pt BIB [facility] from home for c/o increased swelling in BLE.  Pt currently on 4L oxygen, uses same at home.  Pt currently denies c/o CP, abd pain or back pain.

## 2018-01-24 NOTE — TELEPHONE ENCOUNTER
Patient's caregiver, Adriana morris states that the patient was in the ER and was advised to inform you once she went home. They didn't advise them to make an appointment, and she wants to know if she should make an appointment? Please advise.

## 2018-01-24 NOTE — ED PROVIDER NOTES
ED Provider Note    Scribed for Alden Golden M.D. by Alden Golden. 1/24/2018,  8:07 AM.    CHIEF COMPLAINT  Chief Complaint   Patient presents with   • Leg Swelling     bilat, Hx of same, states swelling is getting worse       HPI  Barbara Murray is a 89 y.o. female who presents to the Emergency Department to ED via ambulance from home. States she has swelling in her legs and they are achy. Also c/o bloody nose last night that lasted approximately 10 min. Pt uses O2 at home 3 L continuously, but denies any increased oxygen requirement or shortness of breath.    A review of her records shows that her last BNP was just over 300. She's had multiple negative troponins. Her records also show that she was admitted on January 5, about 19 days ago, for a CHF exacerbation. At that time she was discharged with supplemental oxygen, and this was new to her, it was attributed to worsening cardiac function demonstrated on her cardiac ultrasound. However, at that time, she was esdras about not having been taking her Lasix as prescribed and difficulty swallowing the pills. However, she says that she has been compliant with her Lasix, and only missed a dose last night because of a nosebleed, and this morning because it wasn't time to take the pills yet. She does have daily home health visits, and has a primary care doctor and cardiologist within our system. She denies fevers or chills or nausea or vomiting chest pain or shortness of breath. She denies any increase in her oxygen requirement. She feels that her symptoms are limited to worsening leg swelling over the course of the last several days.    Her Lasix, as noted in her chart review, seems to be 20 mg once a day, but the patient is taking it twice per day (which seems more appropriate for that dose and her history and symptoms).    REVIEW OF SYSTEMS  See HPI for further details. All other systems are negative.     PAST MEDICAL HISTORY   has a past medical history of  Abnormal EKG; Anesthesia; Arrest of bone development or growth; Arrhythmia; Arthritis; Atrial fibrillation (CMS-HCC) (September 2014); Backpain; CAD (coronary artery disease) (August 2013); CATARACT; Cellulitis and abscess of upper arm and forearm; CHF (congestive heart failure) (CMS-HCC) (5/11/2015); Chronic UTI (urinary tract infection) (3/30/2017); Coronary heart disease; Crohns disease (CMS-HCC); Degeneration of lumbar or lumbosacral intervertebral disc; Disorder of bone and cartilage, unspecified; Disorders of bursae and tendons in shoulder region, unspecified; Glaucoma; Heart burn; Hiatus hernia syndrome; High cholesterol; Hyperlipidemia; Hypertension; Hypertonicity of bladder; MI (myocardial infarction) ( ); Osteoporosis, unspecified; Other extrapyramidal disease and abnormal movement disorder; Other malaise and fatigue; Other specified disorder of intestines; Pain; Pain; Pneumonia; Reflux esophagitis; Restless leg syndrome; Rheumatoid arthritis (CMS-HCC); Rotator cuff (capsule) sprain; Severe tricuspid regurgitation by prior echocardiogram (11/5/2016); Stroke (CMS-HCC) (August 2013); Symptomatic menopausal or female climacteric states; Unspecified hemorrhagic conditions; and Unspecified urinary incontinence.    SOCIAL HISTORY  Social History     Social History Main Topics   • Smoking status: Former Smoker     Years: 2.00     Types: Cigarettes     Start date: 1/1/1956     Quit date: 11/19/1958   • Smokeless tobacco: Never Used      Comment: .5 ppd 9 yrs,quit 1966   • Alcohol use Yes      Comment: last drink 3 weeks ago   • Drug use: No   • Sexual activity: Not Currently     Birth control/ protection: Post-Menopausal     History   Drug Use No       SURGICAL HISTORY   has a past surgical history that includes tonsillectomy (1952); paulino by laparoscopy (1994); cataract phaco with iol (2007); rotator cuff repair (Right, 2008); other; other (1935); other; knee arthroplasty total (6/21/2010); inguinal hernia repair  bilateral (3/2/2011); gastroscopy with balloon dilatation (7/25/2013); carpal tunnel release (Right, 10/20/2015); trigger finger release (Right, 10/20/2015); cholecystectomy; remv 2nd cataract,corn-scler sectn; and arthroscopy, knee.    CURRENT MEDICATIONS  Home Medications     Reviewed by Kim Bhatia (Pharmacy Tech) on 01/24/18 at 0836  Med List Status: Complete   Medication Last Dose Status   apixaban (ELIQUIS) 2.5mg Tab 1/22/2018 Active   carvedilol (COREG) 6.25 MG Tab 1/22/2018 Active   duloxetine (CYMBALTA) 20 MG Cap DR Particles 1/22/2018 Active   furosemide (LASIX) 20 MG Tab 1/22/2018 Active   lisinopril (PRINIVIL) 5 MG Tab 1/22/2018 Active   nitrofurantoin macro crystals (MACRODANTIN) 100 MG Cap 1/22/2018 Active   oxybutynin (DITROPAN) 5 MG Tab 1/22/2018 Active   ropinirole (REQUIP) 1 MG Tab 1/22/2018 Active   rosuvastatin (CRESTOR) 20 MG Tab 1/22/2018 Active   timolol PF (TIMOPTIC PF) 0.5 % Solution 1/22/2018 Active   tramadol (ULTRAM) 50 MG Tab > 1 week Active   tramadol (ULTRAM-ER) 100 MG tablet > 1 week Active   travoprost (TRAVATAN Z) 0.004 % Solution 1/22/2018 Active                ALLERGIES  Allergies   Allergen Reactions   • Pcn [Penicillins] Hives   • Sulfa Drugs Nausea       PHYSICAL EXAM  VITAL SIGNS: BP (!) 169/105   Pulse 83   Temp 36.8 °C (98.2 °F)   Resp (!) 22   Ht 1.524 m (5')   Wt 54.4 kg (120 lb)   SpO2 99%   BMI 23.44 kg/m²   Pulse ox interpretation: I interpret this pulse ox as normal.  Constitutional: Alert in no apparent distress.  HENT: No signs of trauma, Bilateral external ears normal, Nose normal.   Eyes: Conjunctiva normal, Non-icteric.   Neck: Normal range of motion, Supple, No stridor.   Lymphatic: No lymphadenopathy noted.   Cardiovascular: Regular rate and rhythm, no murmurs.   Thorax & Lungs: General decrease in air movement without obvious abnormal breath sounds, No respiratory distress, No wheezing, No chest tenderness.   Abdomen: Bowel sounds normal,  Soft, No tenderness, No masses, No pulsatile masses. No peritoneal signs.  Skin: Warm, Dry, No erythema, No rash.   Back: No CVA tenderness.  Extremities: Intact distal pulses, impressive symmetric pitting bilateral lower extremity edema which extends to the thighs and sacrum, No cyanosis.  Musculoskeletal: Good range of motion in all major joints. No or major deformities noted.   Neurologic: Alert , Normal motor function, Normal sensory function, No focal deficits noted.   Psychiatric: Affect normal, Judgment normal, Mood normal.     DIAGNOSTIC STUDIES / PROCEDURES    EKG  Interpreted by me    Rhythm:  Artial fibrillation  Rate: 83  Axis: normal  Intervals: normal  Ectopy: none  Conduction: normal  ST Segments: no acute change  T Waves: no acute change  Q Waves: Isolated in lead 3  Old EKG from 1/3/18 shows no significant changes.    LABS  Labs Reviewed   CBC WITH DIFFERENTIAL - Abnormal; Notable for the following:        Result Value    RBC 3.68 (*)     Hemoglobin 8.6 (*)     Hematocrit 28.0 (*)     MCV 76.1 (*)     MCH 23.4 (*)     MCHC 30.7 (*)     Neutrophils-Polys 75.50 (*)     Lymphocytes 11.50 (*)     Lymphs (Absolute) 0.90 (*)     All other components within normal limits   BASIC METABOLIC PANEL - Abnormal; Notable for the following:     Sodium 134 (*)     Glucose 107 (*)     All other components within normal limits   BTYPE NATRIURETIC PEPTIDE - Abnormal; Notable for the following:     B Natriuretic Peptide 298 (*)     All other components within normal limits   TROPONIN   ESTIMATED GFR     All labs reviewed by me.    RADIOLOGY  DX-CHEST-LIMITED (1 VIEW)   Final Result      1.  Cardiomegaly.      2.  Bilateral linear atelectasis.      3.  Calcified granulomas.      4.  Old right rib fractures.        The radiologist's interpretation of all radiological studies have been reviewed by me.    COURSE & MEDICAL DECISION MAKING  Nursing notes, VS, PMSFHx reviewed in chart.     8:07 AM Patient seen and examined at  bedside. Her physical exam demonstrates significant pitting edema extending to the lumbosacral area, concerning for the development of anasarca. She denies subjective shortness of breath, but does seem to be speaking in abbreviated sentences, though does not have an increase in her oxygen requirement. However, as noted above, any oxygen requirement at all is new to her as of this month. Differential diagnosis includes but is not limited to CHF exacerbation, anasarca, kidney dysfunction, ACS. Ordered for EKG, chest x-ray, laboratory testing to evaluate.     12:04 PM I spoke with the schedulers at [facility], who will reach out to the patient to arrange cardiology follow-up. I have also spoken with the patient's primary care doctor, Mahendra Morrow, who is in agreement with my care plan, which involves doubling the patient's Lasix to 40 mg twice a day, and having her seen in primary care clinic early next week.     The patient will return for new or worsening symptoms and is stable at the time of discharge.    The patient is referred to a primary physician for blood pressure management, diabetic screening, and for all other preventative health concerns.      DISPOSITION:  Patient will be discharged home in stable condition.    FOLLOW UP:  Mahendra Morrow M.D.  [address] [facility] [place]   [address]  [place] [id number] [phone]    Call today      Kirsten Garza M.D.  [address] [facility] [address]  [place] [id number] [phone]    Call today        OUTPATIENT MEDICATIONS:  New Prescriptions    No medications on file         FINAL IMPRESSION  1. Acute on chronic systolic heart failure (CMS-HCC)

## 2018-01-24 NOTE — ED NOTES
Reviewed discharge instructions and Lasix prescription w/ pt and caregiver, verbalized understanding to information provided including follow up care w/ PCP today, denied questions/concerns.  Pt assisted from ER via WC w/ caregiver.

## 2018-01-24 NOTE — DISCHARGE INSTRUCTIONS
Your chest x-ray and laboratory tests were very reassuring. Your leg swelling is related to your congestive heart failure. We have talked to your primary care doctor, who would like to see you early next week. In the meantime, we are going to increase yourr water pill. You will now take 40 mg of Lasix twice per day instead of 20 mg. We have sent some additional medication to your pharmacy. We have also called the cardiology department, since they wanted to see you after your recent admission. Although they should call you to schedule follow-up, usually call yourself to ensure that this happens. Call Dr. Garza using the contact information we have provided. Return to the emergency department for any worsening symptoms that occur before you are able to see Dr. Morrow or Dr. Garza.    Heart Failure  Heart failure is a condition in which the heart has trouble pumping blood. This means your heart does not pump blood efficiently for your body to work well. In some cases of heart failure, fluid may back up into your lungs or you may have swelling (edema) in your lower legs. Heart failure is usually a long-term (chronic) condition. It is important for you to take good care of yourself and follow your health care provider's treatment plan.  CAUSES   Some health conditions can cause heart failure. Those health conditions include:  · High blood pressure (hypertension). Hypertension causes the heart muscle to work harder than normal. When pressure in the blood vessels is high, the heart needs to pump (contract) with more force in order to circulate blood throughout the body. High blood pressure eventually causes the heart to become stiff and weak.  · Coronary artery disease (CAD). CAD is the buildup of cholesterol and fat (plaque) in the arteries of the heart. The blockage in the arteries deprives the heart muscle of oxygen and blood. This can cause chest pain and may lead to a heart attack. High blood pressure can also  contribute to CAD.  · Heart attack (myocardial infarction). A heart attack occurs when one or more arteries in the heart become blocked. The loss of oxygen damages the muscle tissue of the heart. When this happens, part of the heart muscle dies. The injured tissue does not contract as well and weakens the heart's ability to pump blood.  · Abnormal heart valves. When the heart valves do not open and close properly, it can cause heart failure. This makes the heart muscle pump harder to keep the blood flowing.  · Heart muscle disease (cardiomyopathy or myocarditis). Heart muscle disease is damage to the heart muscle from a variety of causes. These can include drug or alcohol abuse, infections, or unknown reasons. These can increase the risk of heart failure.  · Lung disease. Lung disease makes the heart work harder because the lungs do not work properly. This can cause a strain on the heart, leading it to fail.  · Diabetes. Diabetes increases the risk of heart failure. High blood sugar contributes to high fat (lipid) levels in the blood. Diabetes can also cause slow damage to tiny blood vessels that carry important nutrients to the heart muscle. When the heart does not get enough oxygen and food, it can cause the heart to become weak and stiff. This leads to a heart that does not contract efficiently.  · Other conditions can contribute to heart failure. These include abnormal heart rhythms, thyroid problems, and low blood counts (anemia).  Certain unhealthy behaviors can increase the risk of heart failure, including:  · Being overweight.  · Smoking or chewing tobacco.  · Eating foods high in fat and cholesterol.  · Abusing illicit drugs or alcohol.  · Lacking physical activity.  SYMPTOMS   Heart failure symptoms may vary and can be hard to detect. Symptoms may include:  · Shortness of breath with activity, such as climbing stairs.  · Persistent cough.  · Swelling of the feet, ankles, legs, or abdomen.  · Unexplained  weight gain.  · Difficulty breathing when lying flat (orthopnea).  · Waking from sleep because of the need to sit up and get more air.  · Rapid heartbeat.  · Fatigue and loss of energy.  · Feeling light-headed, dizzy, or close to fainting.  · Loss of appetite.  · Nausea.  · Increased urination during the night (nocturia).  DIAGNOSIS   A diagnosis of heart failure is based on your history, symptoms, physical examination, and diagnostic tests. Diagnostic tests for heart failure may include:  · Echocardiography.  · Electrocardiography.  · Chest X-ray.  · Blood tests.  · Exercise stress test.  · Cardiac angiography.  · Radionuclide scans.  TREATMENT   Treatment is aimed at managing the symptoms of heart failure. Medicines, behavioral changes, or surgical intervention may be necessary to treat heart failure.  · Medicines to help treat heart failure may include:  ¨ Angiotensin-converting enzyme (ACE) inhibitors. This type of medicine blocks the effects of a blood protein called angiotensin-converting enzyme. ACE inhibitors relax (dilate) the blood vessels and help lower blood pressure.  ¨ Angiotensin receptor blockers (ARBs). This type of medicine blocks the actions of a blood protein called angiotensin. Angiotensin receptor blockers dilate the blood vessels and help lower blood pressure.  ¨ Water pills (diuretics). Diuretics cause the kidneys to remove salt and water from the blood. The extra fluid is removed through urination. This loss of extra fluid lowers the volume of blood the heart pumps.  ¨ Beta blockers. These prevent the heart from beating too fast and improve heart muscle strength.  ¨ Digitalis. This increases the force of the heartbeat.  · Healthy behavior changes include:  ¨ Obtaining and maintaining a healthy weight.  ¨ Stopping smoking or chewing tobacco.  ¨ Eating heart-healthy foods.  ¨ Limiting or avoiding alcohol.  ¨ Stopping illicit drug use.  ¨ Physical activity as directed by your health care  provider.  · Surgical treatment for heart failure may include:  ¨ A procedure to open blocked arteries, repair damaged heart valves, or remove damaged heart muscle tissue.  ¨ A pacemaker to improve heart muscle function and control certain abnormal heart rhythms.  ¨ An internal cardioverter defibrillator to treat certain serious abnormal heart rhythms.  ¨ A left ventricular assist device (LVAD) to assist the pumping ability of the heart.  HOME CARE INSTRUCTIONS   · Take medicines only as directed by your health care provider. Medicines are important in reducing the workload of your heart, slowing the progression of heart failure, and improving your symptoms.  ¨ Do not stop taking your medicine unless directed by your health care provider.  ¨ Do not skip any dose of medicine.  ¨ Refill your prescriptions before you run out of medicine. Your medicines are needed every day.  · Engage in moderate physical activity if directed by your health care provider. Moderate physical activity can benefit some people. The elderly and people with severe heart failure should consult with a health care provider for physical activity recommendations.  · Eat heart-healthy foods. Food choices should be free of trans fat and low in saturated fat, cholesterol, and salt (sodium). Healthy choices include fresh or frozen fruits and vegetables, fish, lean meats, legumes, fat-free or low-fat dairy products, and whole grain or high fiber foods. Talk to a dietitian to learn more about heart-healthy foods.  · Limit sodium if directed by your health care provider. Sodium restriction may reduce symptoms of heart failure in some people. Talk to a dietitian to learn more about heart-healthy seasonings.  · Use healthy cooking methods. Healthy cooking methods include roasting, grilling, broiling, baking, poaching, steaming, or stir-frying. Talk to a dietitian to learn more about healthy cooking methods.  · Limit fluids if directed by your health care  provider. Fluid restriction may reduce symptoms of heart failure in some people.  · Weigh yourself every day. Daily weights are important in the early recognition of excess fluid. You should weigh yourself every morning after you urinate and before you eat breakfast. Wear the same amount of clothing each time you weigh yourself. Record your daily weight. Provide your health care provider with your weight record.  · Monitor and record your blood pressure if directed by your health care provider.  · Check your pulse if directed by your health care provider.  · Lose weight if directed by your health care provider. Weight loss may reduce symptoms of heart failure in some people.  · Stop smoking or chewing tobacco. Nicotine makes your heart work harder by causing your blood vessels to constrict. Do not use nicotine gum or patches before talking to your health care provider.  · Keep all follow-up visits as directed by your health care provider. This is important.  · Limit alcohol intake to no more than 1 drink per day for nonpregnant women and 2 drinks per day for men. One drink equals 12 ounces of beer, 5 ounces of wine, or 1½ ounces of hard liquor. Drinking more than that is harmful to your heart. Tell your health care provider if you drink alcohol several times a week. Talk with your health care provider about whether alcohol is safe for you. If your heart has already been damaged by alcohol or you have severe heart failure, drinking alcohol should be stopped completely.  · Stop illicit drug use.  · Stay up-to-date with immunizations. It is especially important to prevent respiratory infections through current pneumococcal and influenza immunizations.  · Manage other health conditions such as hypertension, diabetes, thyroid disease, or abnormal heart rhythms as directed by your health care provider.  · Learn to manage stress.  · Plan rest periods when fatigued.  · Learn strategies to manage high temperatures. If the  weather is extremely hot:  ¨ Avoid vigorous physical activity.  ¨ Use air conditioning or fans or seek a cooler location.  ¨ Avoid caffeine and alcohol.  ¨ Wear loose-fitting, lightweight, and light-colored clothing.  · Learn strategies to manage cold temperatures. If the weather is extremely cold:  ¨ Avoid vigorous physical activity.  ¨ Layer clothes.  ¨ Wear mittens or gloves, a hat, and a scarf when going outside.  ¨ Avoid alcohol.  · Obtain ongoing education and support as needed.  · Participate in or seek rehabilitation as needed to maintain or improve independence and quality of life.  SEEK MEDICAL CARE IF:   · You have a rapid weight gain.  · You have increasing shortness of breath that is unusual for you.  · You are unable to participate in your usual physical activities.  · You tire easily.  · You cough more than normal, especially with physical activity.  · You have any or more swelling in areas such as your hands, feet, ankles, or abdomen.  · You are unable to sleep because it is hard to breathe.  · You feel like your heart is beating fast (palpitations).  · You become dizzy or light-headed upon standing up.  SEEK IMMEDIATE MEDICAL CARE IF:   · You have difficulty breathing.  · There is a change in mental status such as decreased alertness or difficulty with concentration.  · You have a pain or discomfort in your chest.  · You have an episode of fainting (syncope).  MAKE SURE YOU:   · Understand these instructions.  · Will watch your condition.  · Will get help right away if you are not doing well or get worse.     This information is not intended to replace advice given to you by your health care provider. Make sure you discuss any questions you have with your health care provider.     Document Released: 12/18/2006 Document Revised: 05/03/2016 Document Reviewed: 01/17/2014  [employer] Patient Education ©2016 [employer]

## 2018-01-29 PROBLEM — L03.113 CELLULITIS OF FOREARM, RIGHT: Status: RESOLVED | Noted: 2017-08-17 | Resolved: 2018-01-01

## 2018-01-29 NOTE — LETTER
[facility]   [address]   [address]  [place] [address]  Phone: [phone]  Fax: [phone]              Barbara Murray  8/10/1928    Encounter Date: 1/29/2018    REGINALDO Hood          PROGRESS NOTE:  Subjective:   Barbara Murray is a 89 y.o. female who presents today for hospital follow-up of CHF.    Barbara is an 89 year old female with history of chronic atrial fibrillation, history of TIA/CVA in August 2013, anticoagulation with Xarelto, moderate CAD, hypertension, hyperlipidemia and moderate/severe pulmonary hypertension, normally followed by Dr. Garza.      Earlier this month, she was admitted to [facility] for shortness of breath and edema; she had not been taking her Lasix at all, and this was restarted in the hospital. Echocardiogram did now some progressive changes. She did improve, and oxygen was started.     She is here today for follow-up. She is taking Lasix 40mg twice daily, and breathing has remained stable. No chest pain, pressure or discomfort; some mild edema, but stable. No palpitations; she remains on Eliquis without bleeding problems. No dizziness or syncope. She is somewhat tired.     Past Medical History:   Diagnosis Date   • Abnormal EKG    • Anesthesia     During cataract procdure, not completely effective   • Arrest of bone development or growth    • Arthritis    • Atrial fibrillation (CMS-HCC) 01/2018    Echocardiogram with normal LV size, mild concentric LVH, LVEF 50%. Severely dilated RA and LA; mildly dilated RV. Moderate MS/mild MR. Mild AS/moderate AI. Moderate TR, RVSP 55mmHg.   • Backpain    • CAD (coronary artery disease) August 2013    Coronary angiogram with 40-50% stenosis of distal PDA, diagonal ostial 40-50%, RCA ostial 60-70%. LVEF 45-50%   • CATARACT     Status post removal   • CHF (congestive heart failure) (CMS-HCC)    • Chronic anticoagulation    • Chronic UTI (urinary tract  infection) 3/30/2017   • Crohns disease (CMS-HCC)    • Degeneration of lumbar or lumbosacral intervertebral disc    • Disorders of bursae and tendons in shoulder region, unspecified    • Glaucoma    • Hiatus hernia syndrome    • Hyperlipidemia    • Hypertension    • Hypertonicity of bladder    • Osteoporosis, unspecified    • Other extrapyramidal disease and abnormal movement disorder    • Other specified disorder of intestines     Diarrhea   • Pain     low back,pain scale 7   • Pain     R leg, R hand   • Pneumonia    • Reflux esophagitis    • Restless leg syndrome    • Rheumatoid arthritis (CMS-HCC)    • Rotator cuff (capsule) sprain    • Severe tricuspid regurgitation by prior echocardiogram    • Stroke (CMS-HCC) August 2013    speech deficit   • Symptomatic menopausal or female climacteric states    • Unspecified hemorrhagic conditions     Nosebleeds   • Unspecified urinary incontinence      Past Surgical History:   Procedure Laterality Date   • CARPAL TUNNEL RELEASE Right 10/20/2015    Procedure: CARPAL TUNNEL RELEASE OPEN;  Surgeon: Ar Macario M.D.;  Location: [facility];  Service:    • TRIGGER FINGER RELEASE Right 10/20/2015    Procedure: TRIGGER FINGER RELEASE MIDDLE AND RING;  Surgeon: Ar Macario M.D.;  Location: [facility];  Service:    • GASTROSCOPY WITH BALLOON DILATATION  7/25/2013    Performed by Jed Hoyt Jr., M.D. at [facility]   • INGUINAL HERNIA REPAIR BILATERAL  3/2/2011    Performed by NIKOLE BELTRAN at [facility]   • KNEE ARTHROPLASTY TOTAL  6/21/2010    Performed by INNA BILLS at [facility]   • ROTATOR CUFF REPAIR Right 2008    dr bills   • CATARACT PHACO WITH IOL  2007   • MIC BY LAPAROSCOPY  1994   • TONSILLECTOMY  1952   • OTHER  1935    Tube insertion,left lung   • ARTHROSCOPY, KNEE     • CHOLECYSTECTOMY     • OTHER      Epidural    • OTHER      Spinal meningitis   • PB REMV 2ND CATARACT,CORN-SCLER  SECTN       Family History   Problem Relation Age of Onset   • Heart Disease Mother    • Heart Failure Mother    • Lung Disease Father    • Stroke Sister      History   Smoking Status   • Former Smoker   • Years: 2.00   • Types: Cigarettes   • Start date: 1/1/1956   • Quit date: 11/19/1958   Smokeless Tobacco   • Never Used     Comment: .5 ppd 9 yrs,quit 1966     Allergies   Allergen Reactions   • Pcn [Penicillins] Hives   • Sulfa Drugs Nausea     Outpatient Encounter Prescriptions as of 1/29/2018   Medication Sig Dispense Refill   • non-formulary med Inhale 3 L by mouth Continuous.     • duloxetine (CYMBALTA) 20 MG Cap DR Particles Take 20 mg by mouth 2 times a day.     • furosemide (LASIX) 40 MG Tab Take 1 Tab by mouth 2 times a day for 30 days. (Patient taking differently: Take 20 mg by mouth 2 times a day. Patient has 20mg tablets and will take 2 20mg tablets to make 40mg.   ) 60 Tab 0   • tramadol (ULTRAM) 50 MG Tab Take 50 mg by mouth 1 time daily as needed for Moderate Pain (breakthrough pain).     • timolol PF (TIMOPTIC PF) 0.5 % Solution Place 1 Drop in both eyes 2 Times a Day.     • travoprost (TRAVATAN Z) 0.004 % Solution Place 1 Drop in both eyes 2 Times a Day.     • tramadol (ULTRAM-ER) 100 MG tablet Take 100 mg by mouth 1 time daily as needed.  1   • carvedilol (COREG) 6.25 MG Tab Take 1 Tab by mouth 2 times a day, with meals.     • oxybutynin (DITROPAN) 5 MG Tab Take 1 Tab by mouth every day.     • apixaban (ELIQUIS) 2.5mg Tab Take 1 Tab by mouth 2 Times a Day. 180 Tab 3   • rosuvastatin (CRESTOR) 20 MG Tab TAKE 1 TABLET ORALLY DAILY EVERY EVENING 90 Tab 3   • lisinopril (PRINIVIL) 5 MG Tab Take 1 Tab by mouth every day. 90 Tab 4   • ropinirole (REQUIP) 1 MG Tab Take 1 Tab by mouth 3 times a day. 90 Tab 11   • nitrofurantoin macro crystals (MACRODANTIN) 100 MG Cap Take 100 mg by mouth every day.       No facility-administered encounter medications on file as of 1/29/2018.      Review of Systems    "  Constitutional: Negative for chills and fever.   HENT: Negative for congestion.    Respiratory: Negative for cough.    Cardiovascular: Positive for leg swelling. Negative for chest pain, palpitations, orthopnea and PND.        Mild, currently stable.   Gastrointestinal: Negative for abdominal pain and blood in stool.   Musculoskeletal: Negative for myalgias.   Neurological: Negative for dizziness and loss of consciousness.   Endo/Heme/Allergies: Does not bruise/bleed easily.        Objective:   /60   Pulse 66   Ht 1.499 m (4' 11\")   Wt 51.3 kg (113 lb)   BMI 22.82 kg/m²      Physical Exam   Constitutional: She is oriented to person, place, and time. She appears well-developed and well-nourished. No distress.   HENT:   Head: Normocephalic.   Eyes: Conjunctivae and EOM are normal.   Neck: Normal range of motion. Neck supple. No JVD present.   Cardiovascular: Normal rate and intact distal pulses.  An irregularly irregular rhythm present. Exam reveals no gallop and no friction rub.    Murmur heard.   Systolic murmur is present with a grade of 2/6   Murmur at LLSB   Pulmonary/Chest: Effort normal and breath sounds normal.   Abdominal: Soft. Bowel sounds are normal.   Musculoskeletal: Normal range of motion. She exhibits edema.   1+ edema to the ankles bilaterally.   Neurological: She is alert and oriented to person, place, and time.   Skin: Skin is warm and dry. No rash noted. No erythema.   Psychiatric: She has a normal mood and affect. Her behavior is normal.     Lab Results   Component Value Date/Time    WBC 7.8 01/24/2018 08:22 AM    RBC 3.68 (L) 01/24/2018 08:22 AM    HEMOGLOBIN 8.6 (L) 01/24/2018 08:22 AM    HEMATOCRIT 28.0 (L) 01/24/2018 08:22 AM    MCV 76.1 (L) 01/24/2018 08:22 AM    MCH 23.4 (L) 01/24/2018 08:22 AM    MCHC 30.7 (L) 01/24/2018 08:22 AM    MPV 10.9 01/24/2018 08:22 AM      Lab Results   Component Value Date/Time    SODIUM 134 (L) 01/24/2018 08:22 AM    POTASSIUM 3.9 01/24/2018 08:22 AM "    CHLORIDE 98 01/24/2018 08:22 AM    CO2 29 01/24/2018 08:22 AM    GLUCOSE 107 (H) 01/24/2018 08:22 AM    BUN 20 01/24/2018 08:22 AM    CREATININE 0.81 01/24/2018 08:22 AM     CONCLUSIONS OF ECHOCARDIOGRAM OF 1/4/2018 - REVIEWED WITH PATIENT:  Mild concentric left ventricular hypertrophy.  Abnormal septal motion consistent with right ventricular (RV) volume   overload and/or elevated RV end-diastolic pressure.  Mildly dilated right ventricle.  Severely  right atrium.  Severely dilated left atrium.  Moderate mitral stenosis.  Mean transvalvular gradient is 6 mmHg at a heart rate of 60 BPM.  Mild mitral regurgitation.  Diffuse thickening of the aortic valve cusps with reduced excursion.  Mild aortic stenosis with peak and mean gradients of 14 and 7   respectively.  Moderate aortic insufficiency.  Moderate tricuspid regurgitation.  Right ventricular systolic pressure is estimated to be 55 mmHg.    Assessment:     1. Acute on chronic systolic congestive heart failure (CMS-[facility])     2. Chronic systolic heart failure (CMS-[facility])- EF=50%, Nov., 2016 ECHO- dr scruggs     3. Severe tricuspid regurgitation by prior echocardiogram     4. Moderate to severe pulmonary hypertension     5. Chronic atrial fibrillation- rx Eliquis; dr scruggs     6. Chronic anticoagulation- Eliquis (for A.Fib)- dr scruggs     7. Ischemic heart disease due to coronary artery obstruction (CMS-HCC) - Non STEMI, 8/2013; med mgt; no intervention; dr scruggs,      8. Mixed hyperlipidemia         Medical Decision Making:  Today's Assessment / Status / Plan:     1. Recent hospitalization for CHF, now resolved and stable. She remains on Lasix 40mg BID.    2. Moderate TR with moderate/severe pulmonary hypertension, stable.    3. Chronic atrial fibrillation, rate controlled.    4. Chronic anticoagulation with Eliqus, tolerating well without any bleeding problems.    5. CAD, moderate disease, medically managed, stable. She has not had any chest pain.    6.  Hyperlipidemia, treated with Crestor.    Same medications for now. She does see her PCP next week. FU with Dr. Garza in 2 months; sooner if clinical condition changes.    Collaborating MD: Michael Marin

## 2018-01-29 NOTE — PROGRESS NOTES
Subjective:   Barbara Murray is a 89 y.o. female who presents today for hospital follow-up of CHF.    Barbara is an 89 year old female with history of chronic atrial fibrillation, history of TIA/CVA in August 2013, anticoagulation with Xarelto, moderate CAD, hypertension, hyperlipidemia and moderate/severe pulmonary hypertension, normally followed by Dr. Garza.      Earlier this month, she was admitted to [facility] for shortness of breath and edema; she had not been taking her Lasix at all, and this was restarted in the hospital. Echocardiogram did now some progressive changes. She did improve, and oxygen was started.     She is here today for follow-up. She is taking Lasix 40mg twice daily, and breathing has remained stable. No chest pain, pressure or discomfort; some mild edema, but stable. No palpitations; she remains on Eliquis without bleeding problems. No dizziness or syncope. She is somewhat tired.     Past Medical History:   Diagnosis Date   • Abnormal EKG    • Anesthesia     During cataract procdure, not completely effective   • Arrest of bone development or growth    • Arthritis    • Atrial fibrillation (CMS-HCC) 01/2018    Echocardiogram with normal LV size, mild concentric LVH, LVEF 50%. Severely dilated RA and LA; mildly dilated RV. Moderate MS/mild MR. Mild AS/moderate AI. Moderate TR, RVSP 55mmHg.   • Backpain    • CAD (coronary artery disease) August 2013    Coronary angiogram with 40-50% stenosis of distal PDA, diagonal ostial 40-50%, RCA ostial 60-70%. LVEF 45-50%   • CATARACT     Status post removal   • CHF (congestive heart failure) (CMS-HCC)    • Chronic anticoagulation    • Chronic UTI (urinary tract infection) 3/30/2017   • Crohns disease (CMS-HCC)    • Degeneration of lumbar or lumbosacral intervertebral disc    • Disorders of bursae and tendons in shoulder region, unspecified    • Glaucoma    • Hiatus hernia syndrome    • Hyperlipidemia    • Hypertension    • Hypertonicity of  bladder    • Osteoporosis, unspecified    • Other extrapyramidal disease and abnormal movement disorder    • Other specified disorder of intestines     Diarrhea   • Pain     low back,pain scale 7   • Pain     R leg, R hand   • Pneumonia    • Reflux esophagitis    • Restless leg syndrome    • Rheumatoid arthritis (CMS-HCC)    • Rotator cuff (capsule) sprain    • Severe tricuspid regurgitation by prior echocardiogram    • Stroke (CMS-HCC) August 2013    speech deficit   • Symptomatic menopausal or female climacteric states    • Unspecified hemorrhagic conditions     Nosebleeds   • Unspecified urinary incontinence      Past Surgical History:   Procedure Laterality Date   • CARPAL TUNNEL RELEASE Right 10/20/2015    Procedure: CARPAL TUNNEL RELEASE OPEN;  Surgeon: Ar Macario M.D.;  Location: SURGERY [facility];  Service:    • TRIGGER FINGER RELEASE Right 10/20/2015    Procedure: TRIGGER FINGER RELEASE MIDDLE AND RING;  Surgeon: Ar Macario M.D.;  Location: [facility];  Service:    • GASTROSCOPY WITH BALLOON DILATATION  7/25/2013    Performed by Jed Hoyt Jr., M.D. at SURGERY [facility]   • INGUINAL HERNIA REPAIR BILATERAL  3/2/2011    Performed by NIKOLE BELTRAN at SURGERY [facility]   • KNEE ARTHROPLASTY TOTAL  6/21/2010    Performed by INNA BILLS at SURGERY [facility]   • ROTATOR CUFF REPAIR Right 2008    dr bills   • CATARACT PHACO WITH IOL  2007   • MIC BY LAPAROSCOPY  1994   • TONSILLECTOMY  1952   • OTHER  1935    Tube insertion,left lung   • ARTHROSCOPY, KNEE     • CHOLECYSTECTOMY     • OTHER      Epidural    • OTHER      Spinal meningitis   • PB REMV 2ND CATARACT,CORN-SCLER SECTN       Family History   Problem Relation Age of Onset   • Heart Disease Mother    • Heart Failure Mother    • Lung Disease Father    • Stroke Sister      History   Smoking Status   • Former Smoker   • Years: 2.00   • Types: Cigarettes   • Start date: 1/1/1956   • Quit date:  11/19/1958   Smokeless Tobacco   • Never Used     Comment: .5 ppd 9 yrs,quit 1966     Allergies   Allergen Reactions   • Pcn [Penicillins] Hives   • Sulfa Drugs Nausea     Outpatient Encounter Prescriptions as of 1/29/2018   Medication Sig Dispense Refill   • non-formulary med Inhale 3 L by mouth Continuous.     • duloxetine (CYMBALTA) 20 MG Cap DR Particles Take 20 mg by mouth 2 times a day.     • furosemide (LASIX) 40 MG Tab Take 1 Tab by mouth 2 times a day for 30 days. (Patient taking differently: Take 20 mg by mouth 2 times a day. Patient has 20mg tablets and will take 2 20mg tablets to make 40mg.   ) 60 Tab 0   • tramadol (ULTRAM) 50 MG Tab Take 50 mg by mouth 1 time daily as needed for Moderate Pain (breakthrough pain).     • timolol PF (TIMOPTIC PF) 0.5 % Solution Place 1 Drop in both eyes 2 Times a Day.     • travoprost (TRAVATAN Z) 0.004 % Solution Place 1 Drop in both eyes 2 Times a Day.     • tramadol (ULTRAM-ER) 100 MG tablet Take 100 mg by mouth 1 time daily as needed.  1   • carvedilol (COREG) 6.25 MG Tab Take 1 Tab by mouth 2 times a day, with meals.     • oxybutynin (DITROPAN) 5 MG Tab Take 1 Tab by mouth every day.     • apixaban (ELIQUIS) 2.5mg Tab Take 1 Tab by mouth 2 Times a Day. 180 Tab 3   • rosuvastatin (CRESTOR) 20 MG Tab TAKE 1 TABLET ORALLY DAILY EVERY EVENING 90 Tab 3   • lisinopril (PRINIVIL) 5 MG Tab Take 1 Tab by mouth every day. 90 Tab 4   • ropinirole (REQUIP) 1 MG Tab Take 1 Tab by mouth 3 times a day. 90 Tab 11   • nitrofurantoin macro crystals (MACRODANTIN) 100 MG Cap Take 100 mg by mouth every day.       No facility-administered encounter medications on file as of 1/29/2018.      Review of Systems   Constitutional: Negative for chills and fever.   HENT: Negative for congestion.    Respiratory: Negative for cough.    Cardiovascular: Positive for leg swelling. Negative for chest pain, palpitations, orthopnea and PND.        Mild, currently stable.   Gastrointestinal: Negative for  "abdominal pain and blood in stool.   Musculoskeletal: Negative for myalgias.   Neurological: Negative for dizziness and loss of consciousness.   Endo/Heme/Allergies: Does not bruise/bleed easily.        Objective:   /60   Pulse 66   Ht 1.499 m (4' 11\")   Wt 51.3 kg (113 lb)   BMI 22.82 kg/m²     Physical Exam   Constitutional: She is oriented to person, place, and time. She appears well-developed and well-nourished. No distress.   HENT:   Head: Normocephalic.   Eyes: Conjunctivae and EOM are normal.   Neck: Normal range of motion. Neck supple. No JVD present.   Cardiovascular: Normal rate and intact distal pulses.  An irregularly irregular rhythm present. Exam reveals no gallop and no friction rub.    Murmur heard.   Systolic murmur is present with a grade of 2/6   Murmur at LLSB   Pulmonary/Chest: Effort normal and breath sounds normal.   Abdominal: Soft. Bowel sounds are normal.   Musculoskeletal: Normal range of motion. She exhibits edema.   1+ edema to the ankles bilaterally.   Neurological: She is alert and oriented to person, place, and time.   Skin: Skin is warm and dry. No rash noted. No erythema.   Psychiatric: She has a normal mood and affect. Her behavior is normal.     Lab Results   Component Value Date/Time    WBC 7.8 01/24/2018 08:22 AM    RBC 3.68 (L) 01/24/2018 08:22 AM    HEMOGLOBIN 8.6 (L) 01/24/2018 08:22 AM    HEMATOCRIT 28.0 (L) 01/24/2018 08:22 AM    MCV 76.1 (L) 01/24/2018 08:22 AM    MCH 23.4 (L) 01/24/2018 08:22 AM    MCHC 30.7 (L) 01/24/2018 08:22 AM    MPV 10.9 01/24/2018 08:22 AM      Lab Results   Component Value Date/Time    SODIUM 134 (L) 01/24/2018 08:22 AM    POTASSIUM 3.9 01/24/2018 08:22 AM    CHLORIDE 98 01/24/2018 08:22 AM    CO2 29 01/24/2018 08:22 AM    GLUCOSE 107 (H) 01/24/2018 08:22 AM    BUN 20 01/24/2018 08:22 AM    CREATININE 0.81 01/24/2018 08:22 AM     CONCLUSIONS OF ECHOCARDIOGRAM OF 1/4/2018 - REVIEWED WITH PATIENT:  Mild concentric left ventricular " hypertrophy.  Abnormal septal motion consistent with right ventricular (RV) volume   overload and/or elevated RV end-diastolic pressure.  Mildly dilated right ventricle.  Severely  right atrium.  Severely dilated left atrium.  Moderate mitral stenosis.  Mean transvalvular gradient is 6 mmHg at a heart rate of 60 BPM.  Mild mitral regurgitation.  Diffuse thickening of the aortic valve cusps with reduced excursion.  Mild aortic stenosis with peak and mean gradients of 14 and 7   respectively.  Moderate aortic insufficiency.  Moderate tricuspid regurgitation.  Right ventricular systolic pressure is estimated to be 55 mmHg.    Assessment:     1. Acute on chronic systolic congestive heart failure (CMS-[facility])     2. Chronic systolic heart failure (CMS-HCC)- EF=50%, Nov., 2016 ECHO- dr garza     3. Severe tricuspid regurgitation by prior echocardiogram     4. Moderate to severe pulmonary hypertension     5. Chronic atrial fibrillation- rx Eliquis; dr garza     6. Chronic anticoagulation- Eliquis (for A.Fib)- dr garza     7. Ischemic heart disease due to coronary artery obstruction (CMS-HCC) - Non STEMI, 8/2013; med mgt; no intervention; dr garza,      8. Mixed hyperlipidemia         Medical Decision Making:  Today's Assessment / Status / Plan:     1. Recent hospitalization for CHF, now resolved and stable. She remains on Lasix 40mg BID.    2. Moderate TR with moderate/severe pulmonary hypertension, stable.    3. Chronic atrial fibrillation, rate controlled.    4. Chronic anticoagulation with Eliqus, tolerating well without any bleeding problems.    5. CAD, moderate disease, medically managed, stable. She has not had any chest pain.    6. Hyperlipidemia, treated with Crestor.    Same medications for now. She does see her PCP next week. FU with Dr. Garza in 2 months; sooner if clinical condition changes.    Collaborating MD: Michael

## 2018-02-08 NOTE — PROGRESS NOTES
Subjective:      Barbara Murray is a 89 y.o. female who presents with Hospital Follow-up (medication review )  for biventricular systolic and diastolic heart failure decompensation, with EF= 50%., due to noncompliance with diuretics. Better now with resolution of leg edema on lasix 40 mg bid. .  And  The patient is here for followup of chronic medical problems listed below. The patient is compliant with medications and having no side effects from them. Denies chest pain, abdominal pain, dyspnea, myalgias, or cough.   Patient Active Problem List    Diagnosis Date Noted   • Acute on chronic systolic congestive heart failure (CMS-HCC) 01/03/2018     Priority: High   • Chronic systolic heart failure (CMS-HCC)- EF=50%, Jan 2018 ECHO- dr scruggs 11/05/2016     Priority: High   • Mixed hyperlipidemia 11/05/2016     Priority: High   • Essential hypertension 11/05/2016     Priority: High   • Ischemic heart disease due to coronary artery obstruction (CMS-HCC) - Non STEMI, 8/2013; med mgt; no intervention; dr scruggs,  11/05/2016     Priority: High   • Moderate to severe pulmonary hypertension 05/14/2015     Priority: High   • Chronic anticoagulation- Eliquis (for A.Fib)- dr scruggs 04/30/2015     Priority: High   • Old MI (myocardial infarction)- 8/2013, Non STEMI;  dr scruggs 08/14/2013     Priority: High   • CKD (chronic kidney disease) stage 3, GFR 30-59 ml/min 11/05/2016     Priority: Medium   • Severe tricuspid regurgitation by prior echocardiogram 11/05/2016     Priority: Medium   • Iron deficiency anemia due to chronic blood loss 05/12/2015     Priority: Medium   • Chronic atrial fibrillation- rx Eliquis; dr scruggs 04/29/2014     Priority: Medium   • RLS (restless legs syndrome)- requip- d/c or take prn 11/05/2016     Priority: Low   • CVA, old, speech/language deficit- 2013 10/21/2015     Priority: Low   • Glaucoma 10/07/2013     Priority: Low   • SOB (shortness of breath) 01/11/2018   • Difficulty swallowing pills  01/03/2018   • OAB (overactive bladder)- oxybutinin; d/c or take prn 11/10/2017   • Acquired hypothyroidism 11/08/2017   • Abnormal CT scan, chest- pma dr hoover 06/22/2017   • Chronic UTI (urinary tract infection) 03/30/2017   • Chronic pain syndrome-Dr. Capellan; rx- tramadol & cymbalta   04/20/2016   • Esophageal stricture- s/p dilation dr foley 07/31/2013     Pcn [penicillins] and Sulfa drugs  Outpatient Medications Prior to Visit   Medication Sig Dispense Refill   • non-formulary med Inhale 3 L by mouth Continuous.     • duloxetine (CYMBALTA) 20 MG Cap DR Particles Take 20 mg by mouth 2 times a day.     • furosemide (LASIX) 40 MG Tab Take 1 Tab by mouth 2 times a day for 30 days. (Patient taking differently: Take 20 mg by mouth 2 times a day. Patient has 20mg tablets and will take 2 20mg tablets to make 40mg.   ) 60 Tab 0   • nitrofurantoin macro crystals (MACRODANTIN) 100 MG Cap Take 100 mg by mouth every day.     • tramadol (ULTRAM) 50 MG Tab Take 50 mg by mouth 1 time daily as needed for Moderate Pain (breakthrough pain).     • timolol PF (TIMOPTIC PF) 0.5 % Solution Place 1 Drop in both eyes 2 Times a Day.     • travoprost (TRAVATAN Z) 0.004 % Solution Place 1 Drop in both eyes 2 Times a Day.     • tramadol (ULTRAM-ER) 100 MG tablet Take 100 mg by mouth 1 time daily as needed.  1   • carvedilol (COREG) 6.25 MG Tab Take 1 Tab by mouth 2 times a day, with meals.     • oxybutynin (DITROPAN) 5 MG Tab Take 1 Tab by mouth every day.     • apixaban (ELIQUIS) 2.5mg Tab Take 1 Tab by mouth 2 Times a Day. 180 Tab 3   • rosuvastatin (CRESTOR) 20 MG Tab TAKE 1 TABLET ORALLY DAILY EVERY EVENING 90 Tab 3   • lisinopril (PRINIVIL) 5 MG Tab Take 1 Tab by mouth every day. 90 Tab 4   • ropinirole (REQUIP) 1 MG Tab Take 1 Tab by mouth 3 times a day. 90 Tab 11     No facility-administered medications prior to visit.                HPI    Review of Systems   Constitutional: Negative.    HENT: Negative.    Eyes: Negative.   "  Respiratory: Negative.    Cardiovascular: Negative.    Gastrointestinal: Negative.    Genitourinary: Negative.    Musculoskeletal: Negative.    Skin: Negative.    Neurological: Negative.    Endo/Heme/Allergies: Negative.    Psychiatric/Behavioral: Negative.           Objective:     /60   Pulse 90   Temp 36.5 °C (97.7 °F)   Ht 1.499 m (4' 11.02\")   Wt 53.1 kg (117 lb)   BMI 23.62 kg/m²      Physical Exam   Constitutional: She is oriented to person, place, and time. She appears well-developed and well-nourished. No distress.   HENT:   Head: Normocephalic and atraumatic.   Right Ear: External ear normal.   Left Ear: External ear normal.   Nose: Nose normal.   Mouth/Throat: Oropharynx is clear and moist. No oropharyngeal exudate.   Eyes: Conjunctivae and EOM are normal. Pupils are equal, round, and reactive to light. Right eye exhibits no discharge. Left eye exhibits no discharge. No scleral icterus.   Neck: Normal range of motion. Neck supple. No JVD present. No tracheal deviation present. No thyromegaly present.   Cardiovascular: Normal rate, regular rhythm, normal heart sounds and intact distal pulses.  Exam reveals no gallop and no friction rub.    No murmur heard.  Pulmonary/Chest: Effort normal and breath sounds normal. No stridor. No respiratory distress. She has no wheezes. She has no rales. She exhibits no tenderness.   Abdominal: Soft. Bowel sounds are normal. She exhibits no distension and no mass. There is no tenderness. There is no rebound and no guarding.   Musculoskeletal: Normal range of motion. She exhibits no edema or tenderness.   Lymphadenopathy:     She has no cervical adenopathy.   Neurological: She is alert and oriented to person, place, and time. She has normal reflexes. She displays normal reflexes. No cranial nerve deficit. She exhibits normal muscle tone. Coordination normal.   Skin: Skin is warm and dry. No rash noted. She is not diaphoretic. No erythema. No pallor. "   Psychiatric: She has a normal mood and affect. Her behavior is normal. Judgment and thought content normal.   Vitals reviewed.    Admission on 2018, Discharged on 2018   Component Date Value   • Report 2018                      Value:[facility]    Test Date:  2018  Pt Name:    OLY AUGUSTIN                   Department: [facility]  MRN:        [id number]                      Room:  Gender:     Female                       Technician: [id number]  :        [id number]                   Requested By:KELLY PUCKETT  Order #:    [id number]                    Reading MD: KELLY PUCKETT MD    Measurements  Intervals                                Axis  Rate:       83                           P:  NM:                                      QRS:        36  QRSD:       89                           T:          8  QT:         387  QTc:        455    Interpretive Statements  Atrial fibrillation  Compared to ECG 2018 12:15:35  No significant changes    Electronically Signed On 2018 8:15:46 PST by KELLY PUCKETT MD     • WBC 2018 7.8    • RBC 2018 3.68*   • Hemoglobin 2018 8.6*   • Hematocrit 2018 28.0*   • MCV 2018 76.1*   • MCH 2018 23.4*   • MCHC 2018 30.7*   • RDW 2018 49.0    • Platelet Count 2018 258    • MPV 2018 10.9    • Neutrophils-Polys 2018 75.50*   • Lymphocytes 2018 11.50*   • Monocytes 2018 9.10    • Eosinophils 2018 2.80    • Basophils 2018 0.80    • Immature Granulocytes 2018 0.30    • Nucleated RBC 2018 0.00    • Neutrophils (Absolute) 2018 5.93    • Lymphs (Absolute) 2018 0.90*   • Monos (Absolute) 2018 0.71    • Eos (Absolute) 2018 0.22    • Baso (Absolute) 2018 0.06    • Immature Granulocytes (a* 2018 0.02    • NRBC (Absolute) 2018 0.00    • Sodium 2018 134*   • Potassium 2018 3.9    •  Chloride 01/24/2018 98    • Co2 01/24/2018 29    • Glucose 01/24/2018 107*   • Bun 01/24/2018 20    • Creatinine 01/24/2018 0.81    • Calcium 01/24/2018 8.6    • Anion Gap 01/24/2018 7.0    • B Natriuretic Peptide 01/24/2018 298*   • Troponin I 01/24/2018 <0.02    • GFR If  01/24/2018 >60    • GFR If Non  [facility]* 01/24/2018 >60       Lab Results   Component Value Date/Time    HBA1C 6.1 (H) 10/10/2013 05:59 AM    HBA1C 4.9 04/21/2012 09:36 AM     Lab Results   Component Value Date/Time    SODIUM 134 (L) 01/24/2018 08:22 AM    POTASSIUM 3.9 01/24/2018 08:22 AM    CHLORIDE 98 01/24/2018 08:22 AM    CO2 29 01/24/2018 08:22 AM    GLUCOSE 107 (H) 01/24/2018 08:22 AM    BUN 20 01/24/2018 08:22 AM    CREATININE 0.81 01/24/2018 08:22 AM    CREATININE 1.01 (H) 05/03/2010 12:03 PM    BUNCREATRAT 23 07/10/2015 01:18 PM    BUNCREATRAT 24 05/03/2010 12:03 PM    GLOMRATE 53 (L) 05/03/2010 12:03 PM    ALKPHOSPHAT 60 01/03/2018 12:14 PM    ASTSGOT 52 (H) 01/03/2018 12:14 PM    ALTSGPT 41 01/03/2018 12:14 PM    TBILIRUBIN 1.1 01/03/2018 12:14 PM     Lab Results   Component Value Date/Time    INR 1.16 (H) 01/03/2018 12:14 PM    INR 1.23 (H) 11/04/2016 09:50 AM    INR 1.34 (H) 05/11/2015 07:30 PM     Lab Results   Component Value Date/Time    CHOLSTRLTOT 163 11/07/2017 09:17 AM    LDL 91 11/07/2017 09:17 AM    HDL 60 11/07/2017 09:17 AM    TRIGLYCERIDE 62 11/07/2017 09:17 AM       No results found for: TESTOSTERONE  Lab Results   Component Value Date/Time    TSH 2.410 05/03/2010 12:03 PM     Lab Results   Component Value Date/Time    FREET4 0.91 06/17/2016 09:08 AM    FREET4 0.89 12/10/2015 09:23 AM     No results found for: URICACID  No components found for: VITB12  Lab Results   Component Value Date/Time    25HYDROXY 31 11/04/2016 09:50 AM    25HYDROXY 19 (L) 06/17/2016 09:08 AM               Assessment/Plan:     1. Chronic systolic heart failure (CMS-[facility])- EF=50%, Jan 2018 ECHO- dr scruggs    - BTYPE  NATRIURETIC PEPTIDE; Future    2. Mixed hyperlipidemia    Under good control. Continue same regimen.    - TSH; Future  - COMP METABOLIC PANEL; Future  - LIPID PROFILE; Future  - CBC WITH DIFFERENTIAL; Future    3. Essential hypertension      Under good control. Continue same regimen  4. Moderate to severe pulmonary hypertension     Under good control. Continue same regimen    5. Chronic atrial fibrillation- rx Eliquis; dr scruggs     Under good control. Continue same regimen    6. CKD (chronic kidney disease) stage 3, GFR 30-59 ml/min        Under good control. Continue same regimen        40 minute face-to-face encounter took place today.  More than half of this time was spent in the coordination of care of the above problems, as well as counseling.

## 2018-02-08 NOTE — LETTER
February 8, 2018        Barbara Murray  [address] [place] [address]        Dear Barbara:     Current Outpatient Prescriptions   Medication Sig Dispense Refill   • non-formulary med Inhale 3 L by mouth Continuous.     • duloxetine (CYMBALTA) 20 MG Cap DR Particles Take 20 mg by mouth 2 times a day.     • furosemide (LASIX) 40 MG Tab Take 1 Tab by mouth 2 times a day for 30 days. (Patient taking differently: Take 20 mg by mouth 2 times a day. Patient has 20mg tablets and will take 2 20mg tablets to make 40mg.   ) 60 Tab 0   • nitrofurantoin macro crystals (MACRODANTIN) 100 MG Cap Take 100 mg by mouth every day.     • tramadol (ULTRAM) 50 MG Tab Take 50 mg by mouth 1 time daily as needed for Moderate Pain (breakthrough pain).     • timolol PF (TIMOPTIC PF) 0.5 % Solution Place 1 Drop in both eyes 2 Times a Day.     • travoprost (TRAVATAN Z) 0.004 % Solution Place 1 Drop in both eyes 2 Times a Day.     • tramadol (ULTRAM-ER) 100 MG tablet Take 100 mg by mouth 1 time daily as needed.  1   • carvedilol (COREG) 6.25 MG Tab Take 1 Tab by mouth 2 times a day, with meals.     • oxybutynin (DITROPAN) 5 MG Tab Take 1 Tab by mouth every day.     • apixaban (ELIQUIS) 2.5mg Tab Take 1 Tab by mouth 2 Times a Day. 180 Tab 3   • rosuvastatin (CRESTOR) 20 MG Tab TAKE 1 TABLET ORALLY DAILY EVERY EVENING 90 Tab 3   • lisinopril (PRINIVIL) 5 MG Tab Take 1 Tab by mouth every day. 90 Tab 4   • ropinirole (REQUIP) 1 MG Tab Take 1 Tab by mouth 3 times a day. 90 Tab 11     No current facility-administered medications for this visit.          If you have any questions or concerns, please don't hesitate to call.        Sincerely,        Mahendra Morrow M.D.    Electronically Signed

## 2018-02-13 NOTE — PROGRESS NOTES
Patient Barbara Murray discharged from [facility] on 1/6/18 after experiencing shortness of breath and leg swelling. [facility] Patient Advocate assisted with discharge orders including 3 PCP appointments with Dr. Black and Dr. Morrow - 1 of which was kept, and 2 scheduled for 2/8 @ 10:25am and 2/16 @ 9am - and 2 cardio appointments with Dr. Luu and Dr. Garza - 1 of which was kept and the other scheduled for 5/10 @ 10am. PA helped avoid hospital readmission by scheduling a sooner PCP appointment with Dr. Black at the same office as patient’s regular PCP Dr. Morrow. PA also assisted with orders for Oxygen, which the patient obtains from [facility].   PPS Score completed

## 2018-03-15 NOTE — ED PROVIDER NOTES
ED Provider Note    CHIEF COMPLAINT  Chief Complaint   Patient presents with   • Leg Swelling   • Leg Pain     bilat       HPI  Barbara Murray is a 89 y.o. female with history of A. fib and CHF who presents with bilateral lower extremity edema. Patient reports she is taking her medications as directed. She denies any associated fever or constitutional symptoms. Lower extremity edema has been progressively worsening over the past few weeks. She has not seen her primary care physician regarding this chronic worsening. Patient sleeps sitting up secondary to back pain and is unsure if she's had any increased worsening shortness of breath while sleeping. She does deny any worsening decreased exertional capacity. She denies any chest pain. She denies any increase in her home oxygen requirements. She denies any associated abdominal pain.    REVIEW OF SYSTEMS  ROS  See HPI for further details. All other systems are negative.     PAST MEDICAL HISTORY   has a past medical history of Abnormal EKG; Anesthesia; Arrest of bone development or growth; Arthritis; Atrial fibrillation (CMS-HCC) (01/2018); Backpain; CAD (coronary artery disease) (August 2013); CATARACT; CHF (congestive heart failure) (CMS-HCC); Chronic anticoagulation; Chronic UTI (urinary tract infection) (3/30/2017); Crohns disease (CMS-HCC); Degeneration of lumbar or lumbosacral intervertebral disc; Disorders of bursae and tendons in shoulder region, unspecified; Glaucoma; Hiatus hernia syndrome; Hyperlipidemia; Hypertension; Hypertonicity of bladder; Osteoporosis, unspecified; Other extrapyramidal disease and abnormal movement disorder; Other specified disorder of intestines; Pain; Pain; Pneumonia; Reflux esophagitis; Restless leg syndrome; Rheumatoid arthritis (CMS-HCC); Rotator cuff (capsule) sprain; Severe tricuspid regurgitation by prior echocardiogram; Stroke (CMS-HCC) (August 2013); Symptomatic menopausal or female climacteric states; Unspecified hemorrhagic  conditions; and Unspecified urinary incontinence.    SOCIAL HISTORY  Social History     Social History Main Topics   • Smoking status: Former Smoker     Years: 2.00     Types: Cigarettes     Start date: 1/1/1956     Quit date: 11/19/1958   • Smokeless tobacco: Never Used      Comment: .5 ppd 9 yrs,quit 1966   • Alcohol use Yes      Comment: last drink 3 weeks ago   • Drug use: No   • Sexual activity: Not Currently     Birth control/ protection: Post-Menopausal       SURGICAL HISTORY   has a past surgical history that includes tonsillectomy (1952); paulino by laparoscopy (1994); cataract phaco with iol (2007); rotator cuff repair (Right, 2008); other; other (1935); other; knee arthroplasty total (6/21/2010); inguinal hernia repair bilateral (3/2/2011); gastroscopy with balloon dilatation (7/25/2013); carpal tunnel release (Right, 10/20/2015); trigger finger release (Right, 10/20/2015); cholecystectomy; remv 2nd cataract,corn-scler sectn; and arthroscopy, knee.    CURRENT MEDICATIONS  Home Medications    **Home medications have not yet been reviewed for this encounter**         ALLERGIES  Allergies   Allergen Reactions   • Pcn [Penicillins] Hives   • Sulfa Drugs Nausea       PHYSICAL EXAM  Physical Exam   Constitutional: She is oriented to person, place, and time. She appears well-developed and well-nourished.   HENT:   Head: Normocephalic.   Neck: Normal range of motion. Neck supple.   Cardiovascular: Normal rate and regular rhythm.    Bilateral 1+ pitting edema to knees and bilateral lower extremities right equals left. No overlying erythema or underlying induration or fluctuance. Tissues are warm and well perfused   Pulmonary/Chest: Effort normal and breath sounds normal. No respiratory distress. She has no wheezes. She has no rales.   Abdominal: Soft. Bowel sounds are normal. She exhibits no distension. There is no tenderness. There is no rebound and no guarding.   Musculoskeletal: Normal range of motion.    Neurological: She is alert and oriented to person, place, and time.     EKG is A. fib with isolated T-wave inversions in lead 3. Otherwise no regional ST or T changes consistent with regional ischemia    COURSE & MEDICAL DECISION MAKING  Pertinent Labs & Imaging studies reviewed. (See chart for details)  Patient with mild volume overload likely secondary to mild CHF exacerbation. We'll check basic labs chest x-ray EKG and BNP. We will treat with 40 IV Lasix.  Patient's labs are unremarkable. Her EKG is unremarkable. Chest x-ray is unremarkable. I've asked the patient limit her salt intake, she does report that she has been eating a considerable amount of soup lately which I believe is likely because of etiology of patient's worsening mild lower extremity edema. She does not have any signs of cellulitis, there is no discrepancy in size between the 2 and a believe a DVT would be highly unlikely. Patient with long-standing history of anemia. She is refusing a rectal exam and sure denies any bleeding or melena. She understands to follow-up with her primary care physician. She understands that she may need a transfusion within the next month or 2. I've asked that she call her primary care physician tomorrow to discuss titration of medications and her chronic anemia. I discussed return precautions     Patient will follow up with her primary care physician.    FINAL IMPRESSION  1. Chronic anemia, CHF         Electronically signed by: Morris Colmenares, 3/15/2018 3:26 AM

## 2018-03-15 NOTE — DISCHARGE INSTRUCTIONS
Your labs today reveal anemia. This is chronic and progressively worsening. You may need a blood transfusion within the next month or two.  Limit your salt intake, limit the amount of soup you eat as this is generally very high in salt.    Heart Failure  Heart failure means your heart has trouble pumping blood. This makes it hard for your body to work well. Heart failure is usually a long-term (chronic) condition. You must take good care of yourself and follow your doctor's treatment plan.  HOME CARE  · Take your heart medicine as told by your doctor.  ¨ Do not stop taking medicine unless your doctor tells you to.  ¨ Do not skip any dose of medicine.  ¨ Refill your medicines before they run out.  ¨ Take other medicines only as told by your doctor or pharmacist.  · Stay active if told by your doctor. The elderly and people with severe heart failure should talk with a doctor about physical activity.  · Eat heart-healthy foods. Choose foods that are without trans fat and are low in saturated fat, cholesterol, and salt (sodium). This includes fresh or frozen fruits and vegetables, fish, lean meats, fat-free or low-fat dairy foods, whole grains, and high-fiber foods. Lentils and dried peas and beans (legumes) are also good choices.  · Limit salt if told by your doctor.  · Cook in a healthy way. Roast, grill, broil, bake, poach, steam, or stir-hedrick foods.  · Limit fluids as told by your doctor.  · Weigh yourself every morning. Do this after you pee (urinate) and before you eat breakfast. Write down your weight to give to your doctor.  · Take your blood pressure and write it down if your doctor tells you to.  · Ask your doctor how to check your pulse. Check your pulse as told.  · Lose weight if told by your doctor.  · Stop smoking or chewing tobacco. Do not use gum or patches that help you quit without your doctor's approval.  · Schedule and go to doctor visits as told.  · Nonpregnant women should have no more than 1 drink  a day. Men should have no more than 2 drinks a day. Talk to your doctor about drinking alcohol.  · Stop illegal drug use.  · Stay current with shots (immunizations).  · Manage your health conditions as told by your doctor.  · Learn to manage your stress.  · Rest when you are tired.  · If it is really hot outside:  ¨ Avoid intense activities.  ¨ Use air conditioning or fans, or get in a cooler place.  ¨ Avoid caffeine and alcohol.  ¨ Wear loose-fitting, lightweight, and light-colored clothing.  · If it is really cold outside:  ¨ Avoid intense activities.  ¨ Layer your clothing.  ¨ Wear mittens or gloves, a hat, and a scarf when going outside.  ¨ Avoid alcohol.  · Learn about heart failure and get support as needed.  · Get help to maintain or improve your quality of life and your ability to care for yourself as needed.  GET HELP IF:   · You gain weight quickly.  · You are more short of breath than usual.  · You cannot do your normal activities.  · You tire easily.  · You cough more than normal, especially with activity.  · You have any or more puffiness (swelling) in areas such as your hands, feet, ankles, or belly (abdomen).  · You cannot sleep because it is hard to breathe.  · You feel like your heart is beating fast (palpitations).  · You get dizzy or light-headed when you stand up.  GET HELP RIGHT AWAY IF:   · You have trouble breathing.  · There is a change in mental status, such as becoming less alert or not being able to focus.  · You have chest pain or discomfort.  · You faint.  MAKE SURE YOU:   · Understand these instructions.  · Will watch your condition.  · Will get help right away if you are not doing well or get worse.  This information is not intended to replace advice given to you by your health care provider. Make sure you discuss any questions you have with your health care provider.  Document Released: 09/26/2009 Document Revised: 01/08/2016 Document Reviewed: 02/03/2014  [employer] Patient  Education © 2017 [employer]

## 2018-03-15 NOTE — ED NOTES
Pt given dischg instructions  Verbally understands  D/c'ed to home in NAD  Care giver driving pt home

## 2018-03-15 NOTE — ED NOTES
Pt BIB care giver  Pt c/o increased leg swelling and pain   Has same in January however then also had SOB but not now

## 2018-03-22 PROBLEM — I50.33 ACUTE ON CHRONIC HEART FAILURE WITH NORMAL EJECTION FRACTION (HCC): Status: ACTIVE | Noted: 2018-01-01

## 2018-03-23 NOTE — PROGRESS NOTES
Subjective:      Barbara Murray is a 89 y.o. female who presents with Shortness of Breath (NO BETTER); Leg Swelling (GETTING WORSE); and Hospital Follow-up (CHF- HF WITH NORMAL EF)  AND  The patient is here for followup of chronic medical problems listed below. The patient is compliant with medications and having no side effects from them. Denies chest pain, abdominal pain, dyspnea, myalgias, or cough.   Patient Active Problem List    Diagnosis Date Noted   • Acute on chronic heart failure with normal ejection fraction (CMS-HCC) 01/03/2018     Priority: High   • Chronic systolic heart failure (CMS-HCC)- EF=50%, Jan 2018 ECHO- dr scruggs 11/05/2016     Priority: High   • Mixed hyperlipidemia 11/05/2016     Priority: High   • Essential hypertension 11/05/2016     Priority: High   • Ischemic heart disease due to coronary artery obstruction (CMS-HCC) - Non STEMI, 8/2013; med mgt; no intervention; dr scruggs,  11/05/2016     Priority: High   • Moderate to severe pulmonary hypertension 05/14/2015     Priority: High   • Chronic anticoagulation- Eliquis (for A.Fib)- dr scruggs 04/30/2015     Priority: High   • Old MI (myocardial infarction)- 8/2013, Non STEMI;  dr scruggs 08/14/2013     Priority: High   • CKD (chronic kidney disease) stage 3, GFR 30-59 ml/min 11/05/2016     Priority: Medium   • Severe tricuspid regurgitation by prior echocardiogram 11/05/2016     Priority: Medium   • Iron deficiency anemia secondary to inadequate dietary iron intake 05/12/2015     Priority: Medium   • Chronic atrial fibrillation- rx Eliquis; dr scruggs 04/29/2014     Priority: Medium   • RLS (restless legs syndrome)- requip- d/c or take prn 11/05/2016     Priority: Low   • CVA, old, speech/language deficit- 2013 10/21/2015     Priority: Low   • Glaucoma 10/07/2013     Priority: Low   • SOB (shortness of breath) 01/11/2018   • Difficulty swallowing pills 01/03/2018   • OAB (overactive bladder)- oxybutinin; d/c or take prn 11/10/2017   •  Acquired hypothyroidism 11/08/2017   • Abnormal CT scan, chest- pma dr hoover 06/22/2017   • Chronic UTI (urinary tract infection) 03/30/2017   • Chronic pain syndrome-Dr. Capellan; rx- tramadol & cymbalta   04/20/2016   • Esophageal stricture- s/p dilation dr foley 07/31/2013      drugs  Allergies   Allergen Reactions   • Pcn [Penicillins] Hives   • Sulfa Drugs Nausea     ,  Outpatient Medications Prior to Visit   Medication Sig Dispense Refill   • non-formulary med Inhale 3 L by mouth Continuous.     • timolol PF (TIMOPTIC PF) 0.5 % Solution Place 1 Drop in both eyes 2 Times a Day.     • travoprost (TRAVATAN Z) 0.004 % Solution Place 1 Drop in both eyes 2 Times a Day.     • carvedilol (COREG) 6.25 MG Tab Take 1 Tab by mouth 2 times a day, with meals.     • oxybutynin (DITROPAN) 5 MG Tab Take 1 Tab by mouth every day.     • apixaban (ELIQUIS) 2.5mg Tab Take 1 Tab by mouth 2 Times a Day. 180 Tab 3   • rosuvastatin (CRESTOR) 20 MG Tab TAKE 1 TABLET ORALLY DAILY EVERY EVENING 90 Tab 3   • ropinirole (REQUIP) 1 MG Tab Take 1 Tab by mouth 3 times a day. 90 Tab 11   • duloxetine (CYMBALTA) 20 MG Cap DR Particles Take 20 mg by mouth 2 times a day.     • nitrofurantoin macro crystals (MACRODANTIN) 100 MG Cap Take 100 mg by mouth every day.     • tramadol (ULTRAM) 50 MG Tab Take 50 mg by mouth 1 time daily as needed for Moderate Pain (breakthrough pain).     • tramadol (ULTRAM-ER) 100 MG tablet Take 100 mg by mouth 1 time daily as needed.  1   • lisinopril (PRINIVIL) 5 MG Tab Take 1 Tab by mouth every day. 90 Tab 4     No facility-administered medications prior to visit.                HPI    Review of Systems   Constitutional: Negative.    HENT: Negative.    Eyes: Negative.    Respiratory: Positive for shortness of breath.    Cardiovascular: Positive for leg swelling.   Gastrointestinal: Negative.    Genitourinary: Negative.    Musculoskeletal: Negative.    Skin: Negative.    Neurological: Negative.    Endo/Heme/Allergies:  "Negative.    Psychiatric/Behavioral: Negative.           Objective:     /70   Pulse 89   Temp 36.3 °C (97.3 °F)   Resp 16   Ht 1.499 m (4' 11\")   Wt 53.5 kg (118 lb)   SpO2 93%   BMI 23.83 kg/m²      Physical Exam   Cardiovascular:   Murmur heard.  Musculoskeletal: She exhibits edema.               Admission on 03/15/2018, Discharged on 03/15/2018   Component Date Value   • Report 03/15/2018                      Value:[facility]    Test Date:  2018-03-15  Pt Name:    OLY AUGUSTIN                   Department: [facility]  MRN:        [id number]                      [facility]  Gender:     Female                       Technician: LOI  :        [id number]                   Requested By:JONO SALINAS  Order #:    [id number]                    Reading MD:    Measurements  Intervals                                Axis  Rate:       88                           P:  NJ:                                      QRS:        56  QRSD:       98                           T:          2  QT:         380  QTc:        460    Interpretive Statements  Atrial fibrillation  Low voltage, precordial leads  Borderline T abnormalities, inferior leads  Compared to ECG 2018 08:00:35  Low QRS voltage now present  T-wave abnormality now present     • WBC 03/15/2018 6.3    • RBC 03/15/2018 3.60*   • Hemoglobin 03/15/2018 7.5*   • Hematocrit 03/15/2018 26.0*   • MCV 03/15/2018 72.2*   • MCH 03/15/2018 20.8*   • MCHC 03/15/2018 28.8*   • RDW 03/15/2018 46.2    • Platelet Count 03/15/2018 287    • MPV 03/15/2018 10.7    • Neutrophils-Polys 03/15/2018 66.60    • Lymphocytes 03/15/2018 19.20*   • Monocytes 03/15/2018 11.00    • Eosinophils 03/15/2018 1.90    • Basophils 03/15/2018 1.00    • Immature Granulocytes 03/15/2018 0.30    • Nucleated RBC 03/15/2018 0.00    • Lymphs (Absolute) 03/15/2018 1.21    • Monos (Absolute) 03/15/2018 0.69    • Eos (Absolute) 03/15/2018 0.12    • Baso (Absolute) " 03/15/2018 0.06    • Immature Granulocytes (a* 03/15/2018 0.02    • NRBC (Absolute) 03/15/2018 0.00    • Neutrophils (Absolute) 03/15/2018 4.20    • Hypochromia 03/15/2018 2+    • Macrocytosis 03/15/2018 2+    • Sodium 03/15/2018 136    • Potassium 03/15/2018 3.6    • Chloride 03/15/2018 100    • Co2 03/15/2018 29    • Glucose 03/15/2018 119*   • Bun 03/15/2018 38*   • Creatinine 03/15/2018 0.99    • Calcium 03/15/2018 8.9    • Anion Gap 03/15/2018 7.0    • B Natriuretic Peptide 03/15/2018 397*   • Troponin I 03/15/2018 0.02    • Plt Estimation 03/15/2018 Normal    • RBC Morphology 03/15/2018 Present    • Polychromia 03/15/2018 2+    • Schistocytes 03/15/2018 1+    • Target Cells 03/15/2018 1+    • Comments-Diff 03/15/2018 see below    • GFR If  03/15/2018 >60    • GFR If Non  Ameri* 03/15/2018 53*      Lab Results   Component Value Date/Time    HBA1C 6.1 (H) 10/10/2013 05:59 AM    HBA1C 4.9 04/21/2012 09:36 AM     Lab Results   Component Value Date/Time    SODIUM 136 03/15/2018 03:15 AM    POTASSIUM 3.6 03/15/2018 03:15 AM    CHLORIDE 100 03/15/2018 03:15 AM    CO2 29 03/15/2018 03:15 AM    GLUCOSE 119 (H) 03/15/2018 03:15 AM    BUN 38 (H) 03/15/2018 03:15 AM    CREATININE 0.99 03/15/2018 03:15 AM    CREATININE 1.01 (H) 05/03/2010 12:03 PM    BUNCREATRAT 23 07/10/2015 01:18 PM    BUNCREATRAT 24 05/03/2010 12:03 PM    GLOMRATE 53 (L) 05/03/2010 12:03 PM    ALKPHOSPHAT 60 01/03/2018 12:14 PM    ASTSGOT 52 (H) 01/03/2018 12:14 PM    ALTSGPT 41 01/03/2018 12:14 PM    TBILIRUBIN 1.1 01/03/2018 12:14 PM     Lab Results   Component Value Date/Time    INR 1.16 (H) 01/03/2018 12:14 PM    INR 1.23 (H) 11/04/2016 09:50 AM    INR 1.34 (H) 05/11/2015 07:30 PM     Lab Results   Component Value Date/Time    CHOLSTRLTOT 163 11/07/2017 09:17 AM    LDL 91 11/07/2017 09:17 AM    HDL 60 11/07/2017 09:17 AM    TRIGLYCERIDE 62 11/07/2017 09:17 AM       No results found for: TESTOSTERONE  Lab Results   Component  Value Date/Time    TSH 2.410 05/03/2010 12:03 PM     Lab Results   Component Value Date/Time    FREET4 0.91 06/17/2016 09:08 AM    FREET4 0.89 12/10/2015 09:23 AM     No results found for: URICACID  No components found for: VITB12  Lab Results   Component Value Date/Time    25HYDROXY 31 11/04/2016 09:50 AM    25HYDROXY 19 (L) 06/17/2016 09:08 AM       Assessment/Plan:     1. Acute on chronic heart failure with normal ejection fraction (CMS-HCC)     CONT LASIX AND KDUR AND LISINOPRIL  - COMP METABOLIC PANEL; Future  - TSH; Future    2. Mixed hyperlipidemia    Under good control. Continue same regimen.]    3. Essential hypertension   Under good control. Continue same regimen.    - COMP METABOLIC PANEL; Future  - lisinopril (PRINIVIL) 5 MG Tab; Take 1 Tab by mouth every day.  Dispense: 90 Tab; Refill: 4    4. Chronic atrial fibrillation- rx Eliquis; dr scruggs   RATE CONTROLLED ; CONT COREG    5. Iron deficiency anemia secondary to inadequate dietary iron intake    HGB=7- NEEDS W/U; PREV IRON DEF WITH LOW FERRITIN AND %SAT; START IRON RX; MAY NEED TRANSFUSION  - IRON/TOTAL IRON BIND; Future  - FERRITIN; Future  - VITAMIN B12; Future  - FOLATE; Future  - CBC WITH DIFFERENTIAL; Future    30 minute face-to-face encounter took place today.  More than half of this time was spent in the coordination of care of the above problems, as well as counseling.

## 2018-04-12 PROBLEM — I50.33 ACUTE ON CHRONIC HEART FAILURE WITH NORMAL EJECTION FRACTION (HCC): Status: RESOLVED | Noted: 2018-01-01 | Resolved: 2018-01-01

## 2018-04-12 PROBLEM — R60.0 BILATERAL LEG EDEMA: Status: ACTIVE | Noted: 2018-01-01

## 2018-04-12 NOTE — PROGRESS NOTES
Subjective:      Barbara Murray is a 89 y.o. female who presents with Medication Refill (lasix refill)    The patient is here for followup of chronic medical problems listed below. The patient is compliant with medications and having no side effects from them. Denies chest pain, abdominal pain, dyspnea, myalgias, or cough.   Patient Active Problem List    Diagnosis Date Noted   • Acute on chronic heart failure with normal ejection fraction (CMS-HCC) 01/03/2018     Priority: High   • Chronic systolic heart failure (CMS-HCC)- EF=50%, Jan 2018 ECHO- dr scruggs 11/05/2016     Priority: High   • Mixed hyperlipidemia 11/05/2016     Priority: High   • Essential hypertension 11/05/2016     Priority: High   • Ischemic heart disease due to coronary artery obstruction (CMS-HCC) - Non STEMI, 8/2013; med mgt; no intervention; dr scruggs,  11/05/2016     Priority: High   • Moderate to severe pulmonary hypertension 05/14/2015     Priority: High   • Chronic anticoagulation- Eliquis (for A.Fib)- dr scruggs 04/30/2015     Priority: High   • Old MI (myocardial infarction)- 8/2013, Non STEMI;  dr scruggs 08/14/2013     Priority: High   • CKD (chronic kidney disease) stage 3, GFR 30-59 ml/min 11/05/2016     Priority: Medium   • Severe tricuspid regurgitation by prior echocardiogram 11/05/2016     Priority: Medium   • Iron deficiency anemia secondary to inadequate dietary iron intake 05/12/2015     Priority: Medium   • Chronic atrial fibrillation- rx Eliquis; dr scruggs 04/29/2014     Priority: Medium   • RLS (restless legs syndrome)- requip- d/c or take prn 11/05/2016     Priority: Low   • CVA, old, speech/language deficit- 2013 10/21/2015     Priority: Low   • Glaucoma 10/07/2013     Priority: Low   • Bilateral leg edema 04/12/2018   • SOB (shortness of breath) 01/11/2018   • Difficulty swallowing pills 01/03/2018   • OAB (overactive bladder)- oxybutinin; d/c or take prn 11/10/2017   • Acquired hypothyroidism 11/08/2017   • Abnormal  CT scan, chest- [facility] dr hoover 06/22/2017   • Chronic UTI (urinary tract infection) 03/30/2017   • Chronic pain syndrome-Dr. Capellan; rx- tramadol & cymbalta   04/20/2016   • Esophageal stricture- s/p dilation dr foley 07/31/2013     Pcn [penicillins] and Sulfa drugs  Outpatient Medications Prior to Visit   Medication Sig Dispense Refill   • potassium chloride SA (KDUR) 20 MEQ Tab CR Take 1 Tab by mouth 2 times a day. 60 Tab 11   • lisinopril (PRINIVIL) 5 MG Tab Take 1 Tab by mouth every day. 90 Tab 4   • ferrous sulfate 325 (65 Fe) MG tablet Take 1 Tab by mouth 3 times a day. 100 Tab 3   • timolol PF (TIMOPTIC PF) 0.5 % Solution Place 1 Drop in both eyes 2 Times a Day.     • apixaban (ELIQUIS) 2.5mg Tab Take 1 Tab by mouth 2 Times a Day. 180 Tab 3   • furosemide (LASIX) 40 MG Tab Take 1 Tab by mouth every day. 60 Tab 4   • non-formulary med Inhale 3 L by mouth Continuous.     • nitrofurantoin macro crystals (MACRODANTIN) 100 MG Cap Take 100 mg by mouth every day.     • tramadol (ULTRAM) 50 MG Tab Take 50 mg by mouth 1 time daily as needed for Moderate Pain (breakthrough pain).     • travoprost (TRAVATAN Z) 0.004 % Solution Place 1 Drop in both eyes 2 Times a Day.     • tramadol (ULTRAM-ER) 100 MG tablet Take 100 mg by mouth 1 time daily as needed.  1   • carvedilol (COREG) 6.25 MG Tab Take 1 Tab by mouth 2 times a day, with meals.     • oxybutynin (DITROPAN) 5 MG Tab Take 1 Tab by mouth every day.     • rosuvastatin (CRESTOR) 20 MG Tab TAKE 1 TABLET ORALLY DAILY EVERY EVENING (Patient not taking: Reported on 4/12/2018) 90 Tab 3   • ropinirole (REQUIP) 1 MG Tab Take 1 Tab by mouth 3 times a day. 90 Tab 11     No facility-administered medications prior to visit.      Allergies   Allergen Reactions   • Pcn [Penicillins] Hives   • Sulfa Drugs Nausea             HPI    Review of Systems   Constitutional: Negative.    HENT: Negative.    Eyes: Negative.    Respiratory: Negative.    Cardiovascular: Negative.   "  Gastrointestinal: Negative.    Genitourinary: Negative.    Musculoskeletal: Negative.    Skin: Negative.    Neurological: Negative.    Endo/Heme/Allergies: Negative.    Psychiatric/Behavioral: Negative.           Objective:     /62   Pulse 84   Temp 36.3 °C (97.3 °F)   Ht 1.499 m (4' 11\")   Wt 55.8 kg (123 lb)   SpO2 91%   BMI 24.84 kg/m²      Physical Exam   Constitutional: She is oriented to person, place, and time. She appears well-developed and well-nourished. No distress.   HENT:   Head: Normocephalic and atraumatic.   Right Ear: External ear normal.   Left Ear: External ear normal.   Nose: Nose normal.   Mouth/Throat: Oropharynx is clear and moist. No oropharyngeal exudate.   Eyes: Conjunctivae and EOM are normal. Pupils are equal, round, and reactive to light. Right eye exhibits no discharge. Left eye exhibits no discharge. No scleral icterus.   Neck: Normal range of motion. Neck supple. No JVD present. No tracheal deviation present. No thyromegaly present.   Cardiovascular: Normal rate, regular rhythm, normal heart sounds and intact distal pulses.  Exam reveals no gallop and no friction rub.    No murmur heard.  Pulmonary/Chest: Effort normal and breath sounds normal. No stridor. No respiratory distress. She has no wheezes. She has no rales. She exhibits no tenderness.   Abdominal: Soft. Bowel sounds are normal. She exhibits no distension and no mass. There is no tenderness. There is no rebound and no guarding.   Musculoskeletal: Normal range of motion. She exhibits no edema or tenderness.   Lymphadenopathy:     She has no cervical adenopathy.   Neurological: She is alert and oriented to person, place, and time. She has normal reflexes. She displays normal reflexes. No cranial nerve deficit. She exhibits normal muscle tone. Coordination normal.   Skin: Skin is warm and dry. No rash noted. She is not diaphoretic. No erythema. No pallor.   Psychiatric: She has a normal mood and affect. Her " behavior is normal. Judgment and thought content normal.   Vitals reviewed.    Admission on 03/15/2018, Discharged on 03/15/2018   Component Date Value   • Report 03/15/2018                      Value:[facility]    Test Date:  2018-03-15  Pt Name:    OLY AUGUSTIN                   Department: [facility]  MRN:        [id number]                      [facility]  Gender:     Female                       Technician:   :        [id number]                   Requested By:JONO SALINAS  Order #:    [id number]                    Reading MD:    Measurements  Intervals                                Axis  Rate:       88                           P:  GA:                                      QRS:        56  QRSD:       98                           T:          2  QT:         380  QTc:        460    Interpretive Statements  Atrial fibrillation  Low voltage, precordial leads  Borderline T abnormalities, inferior leads  Compared to ECG 2018 08:00:35  Low QRS voltage now present  T-wave abnormality now present     • WBC 03/15/2018 6.3    • RBC 03/15/2018 3.60*   • Hemoglobin 03/15/2018 7.5*   • Hematocrit 03/15/2018 26.0*   • MCV 03/15/2018 72.2*   • MCH 03/15/2018 20.8*   • MCHC 03/15/2018 28.8*   • RDW 03/15/2018 46.2    • Platelet Count 03/15/2018 287    • MPV 03/15/2018 10.7    • Neutrophils-Polys 03/15/2018 66.60    • Lymphocytes 03/15/2018 19.20*   • Monocytes 03/15/2018 11.00    • Eosinophils 03/15/2018 1.90    • Basophils 03/15/2018 1.00    • Immature Granulocytes 03/15/2018 0.30    • Nucleated RBC 03/15/2018 0.00    • Lymphs (Absolute) 03/15/2018 1.21    • Monos (Absolute) 03/15/2018 0.69    • Eos (Absolute) 03/15/2018 0.12    • Baso (Absolute) 03/15/2018 0.06    • Immature Granulocytes (a* 03/15/2018 0.02    • NRBC (Absolute) 03/15/2018 0.00    • Neutrophils (Absolute) 03/15/2018 4.20    • Hypochromia 03/15/2018 2+    • Macrocytosis 03/15/2018 2+    • Sodium 03/15/2018 136     • Potassium 03/15/2018 3.6    • Chloride 03/15/2018 100    • Co2 03/15/2018 29    • Glucose 03/15/2018 119*   • Bun 03/15/2018 38*   • Creatinine 03/15/2018 0.99    • Calcium 03/15/2018 8.9    • Anion Gap 03/15/2018 7.0    • B Natriuretic Peptide 03/15/2018 397*   • Troponin I 03/15/2018 0.02    • Plt Estimation 03/15/2018 Normal    • RBC Morphology 03/15/2018 Present    • Polychromia 03/15/2018 2+    • Schistocytes 03/15/2018 1+    • Target Cells 03/15/2018 1+    • Comments-Diff 03/15/2018 see below    • GFR If  03/15/2018 >60    • GFR If Non  Ameri* 03/15/2018 53*      Lab Results   Component Value Date/Time    HBA1C 6.1 (H) 10/10/2013 05:59 AM    HBA1C 4.9 04/21/2012 09:36 AM     Lab Results   Component Value Date/Time    SODIUM 136 03/15/2018 03:15 AM    POTASSIUM 3.6 03/15/2018 03:15 AM    CHLORIDE 100 03/15/2018 03:15 AM    CO2 29 03/15/2018 03:15 AM    GLUCOSE 119 (H) 03/15/2018 03:15 AM    BUN 38 (H) 03/15/2018 03:15 AM    CREATININE 0.99 03/15/2018 03:15 AM    CREATININE 1.01 (H) 05/03/2010 12:03 PM    BUNCREATRAT 23 07/10/2015 01:18 PM    BUNCREATRAT 24 05/03/2010 12:03 PM    GLOMRATE 53 (L) 05/03/2010 12:03 PM    ALKPHOSPHAT 60 01/03/2018 12:14 PM    ASTSGOT 52 (H) 01/03/2018 12:14 PM    ALTSGPT 41 01/03/2018 12:14 PM    TBILIRUBIN 1.1 01/03/2018 12:14 PM     Lab Results   Component Value Date/Time    INR 1.16 (H) 01/03/2018 12:14 PM    INR 1.23 (H) 11/04/2016 09:50 AM    INR 1.34 (H) 05/11/2015 07:30 PM     Lab Results   Component Value Date/Time    CHOLSTRLTOT 163 11/07/2017 09:17 AM    LDL 91 11/07/2017 09:17 AM    HDL 60 11/07/2017 09:17 AM    TRIGLYCERIDE 62 11/07/2017 09:17 AM       No results found for: TESTOSTERONE  Lab Results   Component Value Date/Time    TSH 2.410 05/03/2010 12:03 PM     Lab Results   Component Value Date/Time    FREET4 0.91 06/17/2016 09:08 AM    FREET4 0.89 12/10/2015 09:23 AM     No results found for: URICACID  No components found for: VITB12  Lab  Results   Component Value Date/Time    25HYDROXY 31 11/04/2016 09:50 AM    25HYDROXY 19 (L) 06/17/2016 09:08 AM               Assessment/Plan:     1. Bilateral leg edema          Not at goal; inc to bid dosing   - furosemide (LASIX) 40 MG Tab; Take 1 Tab by mouth 2 Times a Day.  Dispense: 180 Tab; Refill: 4    Problems including chronic kidney disease stage III, pulmonary hypertension, ischemic heart disease due to CAD, essential hypertension dyslipidemia are all stable. Continue current regimen. Her chronic congestive heart failure with normal ejection fraction is also stable and well compensated.    30 minute face-to-face encounter took place today.  More than half of this time was spent in the coordination of care of the above problems, as well as counseling.

## 2018-04-26 NOTE — PROGRESS NOTES
Subjective:      Barbara Murray is a 89 y.o. female who presents with Follow-Up (doing ok)  AND  The patient is here for followup of chronic medical problems listed below. The patient is compliant with medications and having no side effects from them. Denies chest pain, abdominal pain, dyspnea, myalgias, or cough.   Patient Active Problem List    Diagnosis Date Noted   • Chronic systolic heart failure (CMS-HCC)- EF=50%, Jan 2018 ECHO- dr scruggs 11/05/2016     Priority: High   • Mixed hyperlipidemia 11/05/2016     Priority: High   • Essential hypertension 11/05/2016     Priority: High   • Ischemic heart disease due to coronary artery obstruction (CMS-[facility]) - Non STEMI, 8/2013; med mgt; no intervention; dr scruggs,  11/05/2016     Priority: High   • Moderate to severe pulmonary hypertension 05/14/2015     Priority: High   • Chronic anticoagulation- Eliquis (for A.Fib)- dr scruggs 04/30/2015     Priority: High   • Old MI (myocardial infarction)- 8/2013, Non STEMI;  dr scruggs 08/14/2013     Priority: High   • CKD (chronic kidney disease) stage 3, GFR 30-59 ml/min 11/05/2016     Priority: Medium   • Severe tricuspid regurgitation by prior echocardiogram 11/05/2016     Priority: Medium   • Iron deficiency anemia due to chronic blood loss 05/12/2015     Priority: Medium   • Chronic atrial fibrillation- rx Eliquis; dr scruggs 04/29/2014     Priority: Medium   • RLS (restless legs syndrome)- requip- d/c or take prn 11/05/2016     Priority: Low   • CVA, old, speech/language deficit- 2013 10/21/2015     Priority: Low   • Glaucoma 10/07/2013     Priority: Low   • Bilateral leg edema 04/12/2018   • SOB (shortness of breath) 01/11/2018   • Difficulty swallowing pills 01/03/2018   • OAB (overactive bladder)- oxybutinin; d/c or take prn 11/10/2017   • Acquired hypothyroidism 11/08/2017   • Abnormal CT scan, chest- [facility] dr hoover 06/22/2017   • Chronic UTI (urinary tract infection) 03/30/2017   • Chronic pain syndrome-Dr. Capellan; rx-  tramadol & cymbalta   04/20/2016   • Esophageal stricture- s/p dilation dr foley 07/31/2013     Allergies   Allergen Reactions   • Pcn [Penicillins] Hives   • Sulfa Drugs Nausea     Outpatient Medications Prior to Visit   Medication Sig Dispense Refill   • rosuvastatin (CRESTOR) 20 MG Tab TAKE 1 TABLET BY MOUTH EVERY EVENING 90 Tab 3   • furosemide (LASIX) 40 MG Tab Take 1 Tab by mouth 2 Times a Day. 180 Tab 4   • potassium chloride SA (KDUR) 20 MEQ Tab CR Take 1 Tab by mouth 2 times a day. 60 Tab 11   • lisinopril (PRINIVIL) 5 MG Tab Take 1 Tab by mouth every day. 90 Tab 4   • ferrous sulfate 325 (65 Fe) MG tablet Take 1 Tab by mouth 3 times a day. 100 Tab 3   • tramadol (ULTRAM-ER) 100 MG tablet Take 100 mg by mouth 1 time daily as needed.  1   • carvedilol (COREG) 6.25 MG Tab Take 1 Tab by mouth 2 times a day, with meals.     • apixaban (ELIQUIS) 2.5mg Tab Take 1 Tab by mouth 2 Times a Day. 180 Tab 3   • ropinirole (REQUIP) 1 MG Tab Take 1 Tab by mouth 3 times a day. 90 Tab 11   • non-formulary med Inhale 3 L by mouth Continuous.     • nitrofurantoin macro crystals (MACRODANTIN) 100 MG Cap Take 100 mg by mouth every day.     • tramadol (ULTRAM) 50 MG Tab Take 50 mg by mouth 1 time daily as needed for Moderate Pain (breakthrough pain).     • timolol PF (TIMOPTIC PF) 0.5 % Solution Place 1 Drop in both eyes 2 Times a Day.     • travoprost (TRAVATAN Z) 0.004 % Solution Place 1 Drop in both eyes 2 Times a Day.     • oxybutynin (DITROPAN) 5 MG Tab Take 1 Tab by mouth every day.       No facility-administered medications prior to visit.                HPI    Review of Systems   Constitutional: Negative.    HENT: Negative.    Eyes: Negative.    Respiratory: Negative.    Cardiovascular: Negative.    Gastrointestinal: Negative.    Genitourinary: Negative.    Musculoskeletal: Negative.    Skin: Negative.    Neurological: Negative.    Endo/Heme/Allergies: Negative.    Psychiatric/Behavioral: Negative.           Objective:  "    /72   Pulse 73   Temp 37 °C (98.6 °F)   Ht 1.499 m (4' 11.02\")   Wt 55.8 kg (123 lb)   SpO2 91%   BMI 24.83 kg/m²      Physical Exam   Constitutional: She is oriented to person, place, and time. She appears well-developed and well-nourished. No distress.   HENT:   Head: Normocephalic and atraumatic.   Right Ear: External ear normal.   Left Ear: External ear normal.   Nose: Nose normal.   Mouth/Throat: Oropharynx is clear and moist. No oropharyngeal exudate.   Eyes: Conjunctivae and EOM are normal. Pupils are equal, round, and reactive to light. Right eye exhibits no discharge. Left eye exhibits no discharge. No scleral icterus.   Neck: Normal range of motion. Neck supple. No JVD present. No tracheal deviation present. No thyromegaly present.   Cardiovascular: Normal rate, regular rhythm, normal heart sounds and intact distal pulses.  Exam reveals no gallop and no friction rub.    No murmur heard.  Pulmonary/Chest: Effort normal and breath sounds normal. No stridor. No respiratory distress. She has no wheezes. She has no rales. She exhibits no tenderness.   Abdominal: Soft. Bowel sounds are normal. She exhibits no distension and no mass. There is no tenderness. There is no rebound and no guarding.   Musculoskeletal: Normal range of motion. She exhibits no edema or tenderness.   Lymphadenopathy:     She has no cervical adenopathy.   Neurological: She is alert and oriented to person, place, and time. She has normal reflexes. She displays normal reflexes. No cranial nerve deficit. She exhibits normal muscle tone. Coordination normal.   Skin: Skin is warm and dry. No rash noted. She is not diaphoretic. No erythema. No pallor.   Psychiatric: She has a normal mood and affect. Her behavior is normal. Judgment and thought content normal.   Vitals reviewed.              Hospital Outpatient Visit on 04/24/2018   Component Date Value   • Iron 04/24/2018 17*   • Total Iron Binding 04/24/2018 578*   • % Saturation " 04/24/2018 3*   • Ferritin 04/24/2018 15.0    • Vitamin B12 -True Cobala* 04/24/2018 604    • Folate -Folic Acid 04/24/2018 20.7    • WBC 04/24/2018 5.2    • RBC 04/24/2018 3.99*   • Hemoglobin 04/24/2018 8.6*   • Hematocrit 04/24/2018 29.8*   • MCV 04/24/2018 74.7*   • MCH 04/24/2018 21.6*   • MCHC 04/24/2018 28.9*   • RDW 04/24/2018 58.3*   • Platelet Count 04/24/2018 238    • MPV 04/24/2018 11.7    • Nucleated RBC 04/24/2018 0.00    • NRBC (Absolute) 04/24/2018 0.00    • Neutrophils-Polys 04/24/2018 76.30*   • Lymphocytes 04/24/2018 17.50*   • Monocytes 04/24/2018 3.50    • Eosinophils 04/24/2018 0.90    • Basophils 04/24/2018 1.80    • Neutrophils (Absolute) 04/24/2018 3.97    • Lymphs (Absolute) 04/24/2018 0.91*   • Monos (Absolute) 04/24/2018 0.18    • Eos (Absolute) 04/24/2018 0.05    • Baso (Absolute) 04/24/2018 0.09    • Hypochromia 04/24/2018 1+    • Anisocytosis 04/24/2018 1+    • Macrocytosis 04/24/2018 1+    • Microcytosis 04/24/2018 1+    • Sodium 04/24/2018 139    • Potassium 04/24/2018 3.8    • Chloride 04/24/2018 100    • Co2 04/24/2018 30    • Anion Gap 04/24/2018 9.0    • Glucose 04/24/2018 98    • Bun 04/24/2018 24*   • Creatinine 04/24/2018 0.92    • Calcium 04/24/2018 9.1    • AST(SGOT) 04/24/2018 29    • ALT(SGPT) 04/24/2018 15    • Alkaline Phosphatase 04/24/2018 62    • Total Bilirubin 04/24/2018 0.8    • Albumin 04/24/2018 3.7    • Total Protein 04/24/2018 7.4    • Globulin 04/24/2018 3.7*   • A-G Ratio 04/24/2018 1.0    • TSH 04/24/2018 4.160    • GFR If  04/24/2018 >60    • GFR If Non  Ameri* 04/24/2018 57*   • Manual Diff Status 04/24/2018 PERFORMED    • Peripheral Smear Review 04/24/2018 see below    • Plt Estimation 04/24/2018 Normal    • RBC Morphology 04/24/2018 Present    • Poikilocytosis 04/24/2018 1+    • Ovalocytes 04/24/2018 1+    • Stomatocytes 04/24/2018 1+       Lab Results   Component Value Date/Time    HBA1C 6.1 (H) 10/10/2013 05:59 AM    HBA1C 4.9  04/21/2012 09:36 AM     Lab Results   Component Value Date/Time    SODIUM 139 04/24/2018 10:44 AM    POTASSIUM 3.8 04/24/2018 10:44 AM    CHLORIDE 100 04/24/2018 10:44 AM    CO2 30 04/24/2018 10:44 AM    GLUCOSE 98 04/24/2018 10:44 AM    BUN 24 (H) 04/24/2018 10:44 AM    CREATININE 0.92 04/24/2018 10:44 AM    CREATININE 1.01 (H) 05/03/2010 12:03 PM    BUNCREATRAT 23 07/10/2015 01:18 PM    BUNCREATRAT 24 05/03/2010 12:03 PM    GLOMRATE 53 (L) 05/03/2010 12:03 PM    ALKPHOSPHAT 62 04/24/2018 10:44 AM    ASTSGOT 29 04/24/2018 10:44 AM    ALTSGPT 15 04/24/2018 10:44 AM    TBILIRUBIN 0.8 04/24/2018 10:44 AM     Lab Results   Component Value Date/Time    INR 1.16 (H) 01/03/2018 12:14 PM    INR 1.23 (H) 11/04/2016 09:50 AM    INR 1.34 (H) 05/11/2015 07:30 PM     Lab Results   Component Value Date/Time    CHOLSTRLTOT 163 11/07/2017 09:17 AM    LDL 91 11/07/2017 09:17 AM    HDL 60 11/07/2017 09:17 AM    TRIGLYCERIDE 62 11/07/2017 09:17 AM       No results found for: TESTOSTERONE  Lab Results   Component Value Date/Time    TSH 2.410 05/03/2010 12:03 PM     Lab Results   Component Value Date/Time    FREET4 0.91 06/17/2016 09:08 AM    FREET4 0.89 12/10/2015 09:23 AM     No results found for: URICACID  No components found for: VITB12  Lab Results   Component Value Date/Time    25HYDROXY 31 11/04/2016 09:50 AM    25HYDROXY 19 (L) 06/17/2016 09:08 AM        Assessment/Plan:     1. Iron deficiency anemia due to chronic blood loss   This is slightly better but not at goal. She still has a low hemoglobin of 8.6 which is better than 7.5 a few months ago. She still has only a 3% iron saturation however in an iron level and 17. She was advised on taking at least one iron pill a day 65 mg along with vitamin C, and increasing both iron and vitamin C rich foods.    She's never been checked for celiac disease which is important to do because if she is gluten sensitive changing her diet might also help this as well.    Recheck in 2 months  with lab prior to visit and reassess that time.  - CBC WITH DIFFERENTIAL; Future  - OCCULT BLOOD FECES IMMUNOASSAY; Future  - FERRITIN; Future  - IRON/TOTAL IRON BIND; Future  - CELIAC DISEASE AB PANEL; Future    2. Chronic systolic heart failure (CMS-HCC)- EF=50%, Jan 2018 ECHO- dr scruggs    Under good control. Continue same regimen.      3. Mixed hyperlipidemia   Under good control. Continue same regimen.    - LIPID PROFILE; Future  - COMP METABOLIC PANEL; Future    4. Essential hypertension    Under good control. Continue same regimen.  5. Old MI (myocardial infarction)- 8/2013, Non STEMI;  dr scruggs     Under good control. Continue same regimen.  6. Ischemic heart disease due to coronary artery obstruction (CMS-HCC) - Non STEMI, 8/2013; med mgt; no intervention; dr scruggs,     Under good control. Continue same regimen.  7. Chronic atrial fibrillation- rx Eliquis; dr scruggs         Under good control. Continue same regimen.  8. Acquired hypothyroidism     Under good control. Continue same regimen.  - TSH; Future    9. Bilateral leg edema    Under good control. Continue same regimen.      40 minute face-to-face encounter took place today.  More than half of this time was spent in the coordination of care of the above problems, as well as counseling.

## 2018-06-21 PROBLEM — I05.0 MODERATE MITRAL STENOSIS BY PRIOR ECHOCARDIOGRAM: Status: ACTIVE | Noted: 2018-01-01

## 2018-06-21 PROBLEM — I35.0 MILD AORTIC STENOSIS: Status: ACTIVE | Noted: 2018-01-01

## 2018-06-21 PROBLEM — I35.1 MILD AORTIC REGURGITATION: Status: ACTIVE | Noted: 2018-01-01

## 2018-06-21 NOTE — PROGRESS NOTES
Subjective:      Barbara Murray is a 89 y.o. female who presents with Hypertension (2 mt FV)   and  The patient is here for followup of chronic medical problems listed below. The patient is compliant with medications and having no side effects from them. Denies chest pain, abdominal pain, dyspnea, myalgias, or cough.   Patient Active Problem List    Diagnosis Date Noted   • Chronic systolic heart failure (CMS-[facility])- EF=50%, Jan 2018 ECHO- dr scruggs 11/05/2016     Priority: High   • Mixed hyperlipidemia 11/05/2016     Priority: High   • Essential hypertension 11/05/2016     Priority: High   • Ischemic heart disease due to coronary artery obstruction (CMS-[facility]) - Non STEMI, 8/2013; med mgt; no intervention; dr scruggs,  11/05/2016     Priority: High   • Moderate to severe pulmonary hypertension 05/14/2015     Priority: High   • Chronic anticoagulation- Eliquis (for A.Fib)- dr scruggs 04/30/2015     Priority: High   • Old MI (myocardial infarction)- 8/2013, Non STEMI;  dr scruggs 08/14/2013     Priority: High   • CKD (chronic kidney disease) stage 3, GFR 30-59 ml/min 11/05/2016     Priority: Medium   • Severe tricuspid regurgitation by prior echocardiogram 11/05/2016     Priority: Medium   • Iron deficiency anemia due to chronic blood loss 05/12/2015     Priority: Medium   • Chronic atrial fibrillation- rx Eliquis; dr scruggs 04/29/2014     Priority: Medium   • RLS (restless legs syndrome)- requip- d/c or take prn 11/05/2016     Priority: Low   • CVA, old, speech/language deficit- 2013 10/21/2015     Priority: Low   • Glaucoma 10/07/2013     Priority: Low   • Mild aortic stenosis- ECHO jan 2018; peak=14, mean =7 06/21/2018   • Moderate mitral stenosis by prior echocardiogram 06/21/2018   • Moderate aortic regurgitation 06/21/2018   • Bilateral leg edema 04/12/2018   • SOB (shortness of breath) 01/11/2018   • Difficulty swallowing pills 01/03/2018   • OAB (overactive bladder)- oxybutinin; d/c or take prn 11/10/2017   •  "Acquired hypothyroidism 11/08/2017   • Abnormal CT scan, chest- pma dr hoover 06/22/2017   • Chronic UTI (urinary tract infection) 03/30/2017   • Chronic pain syndrome-Dr. Capellan; rx- tramadol & cymbalta   04/20/2016   • Esophageal stricture- s/p dilation dr foley 07/31/2013     Allergies   Allergen Reactions   • Pcn [Penicillins] Hives   • Sulfa Drugs Nausea     I am having Ms. Murray maintain her ROPINIRole, apixaban, carvedilol, oxybutynin, tramadol, tramadol, timolol PF, travoprost, nitrofurantoin macro crystals, non-formulary med, potassium chloride SA, lisinopril, ferrous sulfate, furosemide, and rosuvastatin.            Hypertension   Associated symptoms include shortness of breath.       Review of Systems   Respiratory: Positive for shortness of breath.    Cardiovascular: Positive for leg swelling.          Objective:     /68   Pulse 82   Temp 36.8 °C (98.3 °F)   Ht 1.499 m (4' 11\")   Wt 56.2 kg (123 lb 12.8 oz)   SpO2 96%   BMI 25.00 kg/m²      Physical Exam   Constitutional: She is oriented to person, place, and time. She appears well-developed and well-nourished. No distress.   HENT:   Head: Normocephalic and atraumatic.   Right Ear: External ear normal.   Left Ear: External ear normal.   Nose: Nose normal.   Mouth/Throat: Oropharynx is clear and moist. No oropharyngeal exudate.   Eyes: Conjunctivae and EOM are normal. Pupils are equal, round, and reactive to light. Right eye exhibits no discharge. Left eye exhibits no discharge. No scleral icterus.   Neck: Normal range of motion. Neck supple. No JVD present. No tracheal deviation present. No thyromegaly present.   Cardiovascular: Normal rate, regular rhythm, normal heart sounds and intact distal pulses.  Exam reveals no gallop and no friction rub.    No murmur heard.  Pulmonary/Chest: Effort normal and breath sounds normal. No stridor. No respiratory distress. She has no wheezes. She has no rales. She exhibits no tenderness.   Abdominal: Soft. " Bowel sounds are normal. She exhibits no distension and no mass. There is no tenderness. There is no rebound and no guarding.   Musculoskeletal: Normal range of motion. She exhibits edema. She exhibits no tenderness.   Lymphadenopathy:     She has no cervical adenopathy.   Neurological: She is alert and oriented to person, place, and time. She has normal reflexes. She displays normal reflexes. No cranial nerve deficit. She exhibits normal muscle tone. Coordination normal.   Skin: Skin is warm and dry. No rash noted. She is not diaphoretic. No erythema. No pallor.   Psychiatric: She has a normal mood and affect. Her behavior is normal. Judgment and thought content normal.   Vitals reviewed.    Hospital Outpatient Visit on 06/19/2018   Component Date Value   • TSH 06/19/2018 3.460    • Cholesterol,Tot 06/19/2018 142    • Triglycerides 06/19/2018 56    • HDL 06/19/2018 59    • LDL 06/19/2018 72    • WBC 06/19/2018 6.1    • RBC 06/19/2018 4.33    • Hemoglobin 06/19/2018 9.5*   • Hematocrit 06/19/2018 33.6*   • MCV 06/19/2018 77.6*   • MCH 06/19/2018 21.9*   • MCHC 06/19/2018 28.3*   • RDW 06/19/2018 66.5*   • Platelet Count 06/19/2018 216    • Nucleated RBC 06/19/2018 0.00    • NRBC (Absolute) 06/19/2018 0.00    • Neutrophils-Polys 06/19/2018 68.20    • Lymphocytes 06/19/2018 19.40*   • Monocytes 06/19/2018 10.60    • Eosinophils 06/19/2018 0.50    • Basophils 06/19/2018 1.00    • Immature Granulocytes 06/19/2018 0.30    • Lymphs (Absolute) 06/19/2018 1.19    • Monos (Absolute) 06/19/2018 0.65    • Eos (Absolute) 06/19/2018 0.03    • Baso (Absolute) 06/19/2018 0.06    • Immature Granulocytes (a* 06/19/2018 0.02    • Neutrophils (Absolute) 06/19/2018 4.18    • Hypochromia 06/19/2018 1+    • Anisocytosis 06/19/2018 1+    • Macrocytosis 06/19/2018 1+    • Microcytosis 06/19/2018 1+    • Ferritin 06/19/2018 27.5    • Iron 06/19/2018 34*   • Total Iron Binding 06/19/2018 524*   • % Saturation 06/19/2018 6*   • Immunoglobulin  A 06/19/2018 320    • Sodium 06/19/2018 142    • Potassium 06/19/2018 4.2    • Chloride 06/19/2018 97    • Co2 06/19/2018 37*   • Anion Gap 06/19/2018 8.0    • Glucose 06/19/2018 121*   • Bun 06/19/2018 23*   • Creatinine 06/19/2018 0.85    • Calcium 06/19/2018 9.4    • AST(SGOT) 06/19/2018 26    • ALT(SGPT) 06/19/2018 13    • Alkaline Phosphatase 06/19/2018 67    • Total Bilirubin 06/19/2018 0.8    • Albumin 06/19/2018 4.0    • Total Protein 06/19/2018 7.0    • Globulin 06/19/2018 3.0    • A-G Ratio 06/19/2018 1.3    • Peripheral Smear Review 06/19/2018 see below    • RBC Morphology 06/19/2018 Present    • Large Platelets 06/19/2018 2+    • Giant Platelets 06/19/2018 2+    • Polychromia 06/19/2018 1+    • Ovalocytes 06/19/2018 1+    • Comments-Diff 06/19/2018 see below    • GFR If  06/19/2018 >60    • GFR If Non  Ameri* 06/19/2018 >60       Lab Results   Component Value Date/Time    HBA1C 6.1 (H) 10/10/2013 05:59 AM    HBA1C 4.9 04/21/2012 09:36 AM     Lab Results   Component Value Date/Time    SODIUM 142 06/19/2018 10:22 AM    POTASSIUM 4.2 06/19/2018 10:22 AM    CHLORIDE 97 06/19/2018 10:22 AM    CO2 37 (H) 06/19/2018 10:22 AM    GLUCOSE 121 (H) 06/19/2018 10:22 AM    BUN 23 (H) 06/19/2018 10:22 AM    CREATININE 0.85 06/19/2018 10:22 AM    CREATININE 1.01 (H) 05/03/2010 12:03 PM    BUNCREATRAT 23 07/10/2015 01:18 PM    BUNCREATRAT 24 05/03/2010 12:03 PM    GLOMRATE 53 (L) 05/03/2010 12:03 PM    ALKPHOSPHAT 67 06/19/2018 10:22 AM    ASTSGOT 26 06/19/2018 10:22 AM    ALTSGPT 13 06/19/2018 10:22 AM    TBILIRUBIN 0.8 06/19/2018 10:22 AM     Lab Results   Component Value Date/Time    INR 1.16 (H) 01/03/2018 12:14 PM    INR 1.23 (H) 11/04/2016 09:50 AM    INR 1.34 (H) 05/11/2015 07:30 PM     Lab Results   Component Value Date/Time    CHOLSTRLTOT 142 06/19/2018 10:22 AM    LDL 72 06/19/2018 10:22 AM    HDL 59 06/19/2018 10:22 AM    TRIGLYCERIDE 56 06/19/2018 10:22 AM       No results found for:  TESTOSTERONE  Lab Results   Component Value Date/Time    TSH 2.410 05/03/2010 12:03 PM     Lab Results   Component Value Date/Time    FREET4 0.91 06/17/2016 09:08 AM    FREET4 0.89 12/10/2015 09:23 AM     No results found for: URICACID  No components found for: VITB12  Lab Results   Component Value Date/Time    25HYDROXY 31 11/04/2016 09:50 AM    25HYDROXY 19 (L) 06/17/2016 09:08 AM                  Assessment/Plan:     1. Iron deficiency anemia due to chronic blood loss  Better but not to goal.  Hemoglobin is improved from 7-9 in the last few months.  Continue on iron 65 mg daily.  Iron studies have improved but still low.   - IRON/TOTAL IRON BIND; Future  - FERRITIN; Future  - OCCULT BLOOD FECES IMMUNOASSAY; Future    2. Mild aortic stenosis- ECHO jan 2018; peak=14, mean =7     I am wondering if this aortic stenosis is not worse than the echo says.  Her murmur of aortic stenosis much worse.    3. Moderate mitral stenosis by prior echocardiogram   Under good control. Continue same regimen.'        4. Moderate aortic regurgitation   Under good control. Continue same regimen.      5. Chronic systolic heart failure (CMS-[facility])- EF=50%, Jan 2018 ECHO- dr scruggs     Under good control. Continue same regimen.      6. CKD (chronic kidney disease) stage 3, GFR 30-59 ml/min       Under good control. Continue same regimen.      7. Mixed hyperlipidemia   Under good control. Continue same regimen.    - COMP METABOLIC PANEL; Future  - LIPID PROFILE; Future  - CBC WITH DIFFERENTIAL; Future    30 minute face-to-face encounter took place today.  More than half of this time was spent in the coordination of care of the above problems, as well as counseling.

## 2018-07-05 NOTE — PROGRESS NOTES
"Chief Complaint   Patient presents with   • Congestive Heart Failure       Subjective:   Barbara \"Nik\" Trevor is a 89 y.o. female who presents today with her caregiver, Yadira, to follow-up on coronary artery disease and congestive heart failure.    She also has pulmonary hypertension, history of CVAs and valvular heart disease.  She has chronic back pain as well.    She was last seen by Wanda STOUT on January 29, 2018.  Since that time she has been relatively stable.  She is continued on Lasix 40 mg twice a day.  She continues to have ankle edema which appears to be chronic.    She has pulmonary hypertension and severe lung disease.  She is on oxygen continuously.  She feels short of breath with walking on a flat surface.     Past Medical History:   Diagnosis Date   • Abnormal EKG    • Anesthesia     During cataract procdure, not completely effective   • Arrest of bone development or growth    • Arthritis    • Atrial fibrillation (HCC) 01/2018    Echocardiogram with normal LV size, mild concentric LVH, LVEF 50%. Severely dilated RA and LA; mildly dilated RV. Moderate MS/mild MR. Mild AS/moderate AI. Moderate TR, RVSP 55mmHg.   • Backpain    • CAD (coronary artery disease) August 2013    Coronary angiogram with 40-50% stenosis of distal PDA, diagonal ostial 40-50%, RCA ostial 60-70%. LVEF 45-50%   • CATARACT     Status post removal   • CHF (congestive heart failure) ([facility])    • Chronic anticoagulation    • Chronic UTI (urinary tract infection) 3/30/2017   • Crohns disease ([facility])    • Degeneration of lumbar or lumbosacral intervertebral disc    • Disorders of bursae and tendons in shoulder region, unspecified    • Glaucoma    • Hiatus hernia syndrome    • Hyperlipidemia    • Hypertension    • Hypertonicity of bladder    • Osteoporosis, unspecified    • Other extrapyramidal disease and abnormal movement disorder    • Other specified disorder of intestines     Diarrhea   • Pain     low back,pain scale 7   • Pain     R " leg, R hand   • Pneumonia    • Reflux esophagitis    • Restless leg syndrome    • Rheumatoid arthritis (HCC)    • Rotator cuff (capsule) sprain    • Severe tricuspid regurgitation by prior echocardiogram    • Stroke ([facility]) August 2013    speech deficit   • Symptomatic menopausal or female climacteric states    • Unspecified hemorrhagic conditions     Nosebleeds   • Unspecified urinary incontinence      Past Surgical History:   Procedure Laterality Date   • CARPAL TUNNEL RELEASE Right 10/20/2015    Procedure: CARPAL TUNNEL RELEASE OPEN;  Surgeon: Ar Macario M.D.;  Location: SURGERY [facility];  Service:    • TRIGGER FINGER RELEASE Right 10/20/2015    Procedure: TRIGGER FINGER RELEASE MIDDLE AND RING;  Surgeon: Ar Macario M.D.;  Location: [facility];  Service:    • GASTROSCOPY WITH BALLOON DILATATION  7/25/2013    Performed by Jed Hoyt Jr., M.D. at [facility]   • INGUINAL HERNIA REPAIR BILATERAL  3/2/2011    Performed by NIKOLE BELTRAN at SURGERY [facility]   • KNEE ARTHROPLASTY TOTAL  6/21/2010    Performed by INNA BILLS at SURGERY [facility]   • ROTATOR CUFF REPAIR Right 2008    dr bills   • CATARACT PHACO WITH IOL  2007   • MIC BY LAPAROSCOPY  1994   • TONSILLECTOMY  1952   • OTHER  1935    Tube insertion,left lung   • ARTHROSCOPY, KNEE     • CHOLECYSTECTOMY     • OTHER      Epidural    • OTHER      Spinal meningitis   • PB REMV 2ND CATARACT,CORN-SCLER SECTN       Family History   Problem Relation Age of Onset   • Heart Disease Mother    • Heart Failure Mother    • Lung Disease Father    • Stroke Sister      Social History     Social History   • Marital status: Single     Spouse name: N/A   • Number of children: N/A   • Years of education: N/A     Occupational History   • Not on file.     Social History Main Topics   • Smoking status: Former Smoker     Years: 2.00     Types: Cigarettes     Start date: 1/1/1956     Quit date: 11/19/1958   •  Smokeless tobacco: Never Used      Comment: .5 ppd 9 yrs,quit 1966   • Alcohol use Yes      Comment: rare   • Drug use: No   • Sexual activity: Not Currently     Birth control/ protection: Post-Menopausal     Other Topics Concern   • Not on file     Social History Narrative   • No narrative on file     Allergies   Allergen Reactions   • Pcn [Penicillins] Hives   • Sulfa Drugs Nausea     Outpatient Encounter Prescriptions as of 7/5/2018   Medication Sig Dispense Refill   • apixaban (ELIQUIS) 2.5mg Tab Take 1 Tab by mouth 2 Times a Day. 180 Tab 3   • carvedilol (COREG) 6.25 MG Tab Take 1 Tab by mouth 2 times a day, with meals. 180 Tab 3   • furosemide (LASIX) 40 MG Tab Take 1 Tab by mouth 2 Times a Day. 180 Tab 4   • ferrous sulfate 325 (65 Fe) MG tablet Take 1 Tab by mouth 3 times a day. 100 Tab 3   • rosuvastatin (CRESTOR) 20 MG Tab TAKE 1 TABLET BY MOUTH EVERY EVENING 90 Tab 3   • potassium chloride SA (KDUR) 20 MEQ Tab CR Take 1 Tab by mouth 2 times a day. 60 Tab 11   • lisinopril (PRINIVIL) 5 MG Tab Take 1 Tab by mouth every day. 90 Tab 4   • non-formulary med Inhale 3 L by mouth Continuous.     • nitrofurantoin macro crystals (MACRODANTIN) 100 MG Cap Take 100 mg by mouth every day.     • tramadol (ULTRAM) 50 MG Tab Take 50 mg by mouth 1 time daily as needed for Moderate Pain (breakthrough pain).     • timolol PF (TIMOPTIC PF) 0.5 % Solution Place 1 Drop in both eyes 2 Times a Day.     • travoprost (TRAVATAN Z) 0.004 % Solution Place 1 Drop in both eyes 2 Times a Day.     • [DISCONTINUED] tramadol (ULTRAM-ER) 100 MG tablet Take 100 mg by mouth 1 time daily as needed.  1   • [DISCONTINUED] carvedilol (COREG) 6.25 MG Tab Take 1 Tab by mouth 2 times a day, with meals.     • [DISCONTINUED] oxybutynin (DITROPAN) 5 MG Tab Take 1 Tab by mouth every day.     • [DISCONTINUED] apixaban (ELIQUIS) 2.5mg Tab Take 1 Tab by mouth 2 Times a Day. 180 Tab 3   • [DISCONTINUED] ropinirole (REQUIP) 1 MG Tab Take 1 Tab by mouth 3  "times a day. 90 Tab 11     No facility-administered encounter medications on file as of 7/5/2018.      Review of Systems   Constitutional: Negative for malaise/fatigue.   Respiratory: Negative for cough and shortness of breath.    Cardiovascular: Positive for leg swelling (\"all the time\"). Negative for chest pain, palpitations, orthopnea, claudication and PND.   Gastrointestinal: Negative for abdominal pain.   Musculoskeletal: Positive for back pain (chronic). Negative for myalgias.   Neurological: Negative for dizziness and weakness.        Objective:   /62   Pulse 86   Ht 1.499 m (4' 11\")     Physical Exam   Constitutional: She is oriented to person, place, and time. She appears well-developed and well-nourished.   Frail elderly female seated in wheelchair with oxygen in place.  Unable to obtain pulse ox as her fingers are cool.  Radial pulses are intact.   HENT:   Head: Normocephalic.   Eyes: EOM are normal.   Neck: No JVD present.   Cardiovascular: Normal rate.  An irregularly irregular rhythm present.   Murmur heard.   Systolic (Harsh holosystolic murmur.) murmur is present with a grade of 3/6   Pulmonary/Chest: Effort normal. She has decreased breath sounds (Severely diminished throughout with some rare rales in the bases.).   Abdominal: Soft. Bowel sounds are normal.   Musculoskeletal: She exhibits edema (1+ pitting edema bilateral pretibial with skin changes of chronic edema.).   Neurological: She is alert and oriented to person, place, and time.   Skin: Skin is warm and dry.   Psychiatric: She has a normal mood and affect.     Results for CHARLI OLY FITZPATRICK (MRN [id number])    Ref. Range 6/19/2018 10:22   Sodium Latest Ref Range: 135 - 145 mmol/L 142   Potassium Latest Ref Range: 3.6 - 5.5 mmol/L 4.2   Chloride Latest Ref Range: 96 - 112 mmol/L 97   Co2 Latest Ref Range: 20 - 33 mmol/L 37 (H)   Anion Gap Latest Ref Range: 0.0 - 11.9  8.0   Glucose Latest Ref Range: 65 - 99 mg/dL 121 (H)   Bun Latest Ref " Range: 8 - 22 mg/dL 23 (H)   Creatinine Latest Ref Range: 0.50 - 1.40 mg/dL 0.85   GFR If  Latest Ref Range: >60 mL/min/1.73 m 2 >60   GFR If Non  Latest Ref Range: >60 mL/min/1.73 m 2 >60   Calcium Latest Ref Range: 8.5 - 10.5 mg/dL 9.4   AST(SGOT) Latest Ref Range: 12 - 45 U/L 26   ALT(SGPT) Latest Ref Range: 2 - 50 U/L 13   Alkaline Phosphatase Latest Ref Range: 30 - 99 U/L 67   Total Bilirubin Latest Ref Range: 0.1 - 1.5 mg/dL 0.8   Albumin Latest Ref Range: 3.2 - 4.9 g/dL 4.0   Total Protein Latest Ref Range: 6.0 - 8.2 g/dL 7.0   Globulin Latest Ref Range: 1.9 - 3.5 g/dL 3.0   A-G Ratio Latest Units: g/dL 1.3   Iron Latest Ref Range: 40 - 170 ug/dL 34 (L)   Total Iron Binding Latest Ref Range: 250 - 450 ug/dL 524 (H)   % Saturation Latest Ref Range: 15 - 55 % 6 (L)   Cholesterol,Tot Latest Ref Range: 100 - 199 mg/dL 142   Triglycerides Latest Ref Range: 0 - 149 mg/dL 56   HDL Latest Ref Range: >=40 mg/dL 59   LDL Latest Ref Range: <100 mg/dL 72       January 4, 2018: Transthoracic Echo Report:  Mild concentric left ventricular hypertrophy.  EF 50%.  Abnormal septal motion consistent with right ventricular (RV) volume overload and/or elevated RV end-diastolic pressure.  Mildly dilated right ventricle.  Severely  right atrium.  Severely dilated left atrium.  Index 112.  Moderate mitral stenosis.  Mean transvalvular gradient is 6 mmHg at a heart rate of 60 BPM.  Mild mitral regurgitation.  Diffuse thickening of the aortic valve cusps with reduced excursion.  Mild aortic stenosis with peak and mean gradients of 14 and 7 respectively.  Moderate aortic insufficiency.  Moderate tricuspid regurgitation.  Right ventricular systolic pressure is estimated to be 55 mmHg.    Assessment:     1. Chronic systolic heart failure (CMS-HCC)- EF=50%, Jan 2018 ECHO-     2. Essential hypertension  carvedilol (COREG) 6.25 MG Tab   3. Ischemic heart disease due to coronary artery obstruction (CMS-HCC) -  Non STEMI, 8/2013; med mgt; no intervention;      4. Chronic atrial fibrillation- rx Eliquis  apixaban (ELIQUIS) 2.5mg Tab       Medical Decision Making:  Today's Assessment / Status / Plan:     Congestive heart failure: Her fluid status looks stable today.  Her lower extremities are probably chronically swollen as this is most likely multifactorial including right-sided heart failure with high pulmonary pressures and dependent edema.  I will have her continue on her current Lasix dosage.    Hypertension: Her blood pressure came down nicely in the office today.  Continue current regimen.    Ischemic heart disease: No anginal symptoms.  Continue current medications.    Atrial fibrillation: She has good rate control.  Continue Eliquis for anticoagulation.    Valvular heart disease: She has mitral stenosis and moderate aortic insufficiency.  Along with moderate tricuspid regurgitation.  Due to her severe lung disease and the multitudes of her valvular problems that she most likely is not a candidate for any interventions.  Her aortic valve does not have significantly high pressures to consider any intervention even TAVR.    She will follow-up in 6 months with Wanda STOUT.  She will need a new cardiologist at some point.  Patient will follow-up sooner if problems.    Collaborating Provider: Dr. Grimes.

## 2018-09-13 PROBLEM — L03.115 BILATERAL CELLULITIS OF LOWER LEG: Status: ACTIVE | Noted: 2018-01-01

## 2018-09-13 PROBLEM — J96.11 CHRONIC HYPOXEMIC RESPIRATORY FAILURE (HCC): Status: ACTIVE | Noted: 2018-01-01

## 2018-09-13 PROBLEM — L03.116 BILATERAL CELLULITIS OF LOWER LEG: Status: ACTIVE | Noted: 2018-01-01

## 2018-09-13 NOTE — ED NOTES
Med rec complete per patient and family at bedside  Allergies reviewed  No PO antibiotics in last 30 days    Patient stated she is only taking 3 medications regularly, all the other she takes when she remembers, if she remembers. Could not tell me last doses on most medications

## 2018-09-13 NOTE — ED PROVIDER NOTES
ED Provider Note  CHIEF COMPLAINT  Chief Complaint   Patient presents with   • Leg Swelling     Pt reports leg swelling to bilateral lower legs, with redness and open weeping wounds that is new   • Open Wound     Weeping sores to lower legs       HPI  Barbara Murray is a 89 y.o. female who presents with gradually increasing redness swelling and weeping to the lower extremities overlying the ankles feet and calf.  No headache or chest pain.  No difficulty breathing.  No fever.  No nausea or vomiting.  No trauma.  He is on Eliquis and Coreg and Prinivil.  She has a history of a loud heart murmur with some type of valvular problem but they have elected not to do any surgery because of her age.    REVIEW OF SYSTEMS  No headache, no fever, no sore throat, no chest pain, no difficulty breathing.  No nausea or vomiting.  ALL OTHER SYSTEMS NEGATIVE    ALLERGIES  Allergies   Allergen Reactions   • Pcn [Penicillins] Hives   • Sulfa Drugs Nausea       CURRENT MEDICATIONS  Home Medications     Reviewed by Alondra Elizalde R.N. (Registered Nurse) on 09/13/18 at 1446  Med List Status: <None>   Medication Last Dose Status   apixaban (ELIQUIS) 2.5mg Tab  Active   carvedilol (COREG) 6.25 MG Tab  Active   ferrous sulfate 325 (65 Fe) MG tablet 7/5/2018 Active   furosemide (LASIX) 40 MG Tab 7/5/2018 Active   lisinopril (PRINIVIL) 5 MG Tab Unknown Active   nitrofurantoin macro crystals (MACRODANTIN) 100 MG Cap Unknown Active   non-formulary med Unknown Active   potassium chloride SA (KDUR) 20 MEQ Tab CR > Month Active   ROPINIRole (REQUIP) 1 MG Tab  Active   rosuvastatin (CRESTOR) 20 MG Tab Unknown Active   timolol PF (TIMOPTIC PF) 0.5 % Solution Unknown Active   tramadol (ULTRAM) 50 MG Tab 4/5/2018 Active   travoprost (TRAVATAN Z) 0.004 % Solution > Month Active                PAST MEDICAL HISTORY  Past Medical History:   Diagnosis Date   • Abnormal EKG    • Anesthesia     During cataract procdure, not completely effective   •  Arrest of bone development or growth    • Arthritis    • Atrial fibrillation (Formerly [facility]) 01/2018    Echocardiogram with normal LV size, mild concentric LVH, LVEF 50%. Severely dilated RA and LA; mildly dilated RV. Moderate MS/mild MR. Mild AS/moderate AI. Moderate TR, RVSP 55mmHg.   • Backpain    • CAD (coronary artery disease) August 2013    Coronary angiogram with 40-50% stenosis of distal PDA, diagonal ostial 40-50%, RCA ostial 60-70%. LVEF 45-50%   • CATARACT     Status post removal   • CHF (congestive heart failure) (Formerly [facility])    • Chronic anticoagulation    • Chronic UTI (urinary tract infection) 3/30/2017   • Crohns disease (Formerly [facility])    • Degeneration of lumbar or lumbosacral intervertebral disc    • Disorders of bursae and tendons in shoulder region, unspecified    • Glaucoma    • Hiatus hernia syndrome    • Hyperlipidemia    • Hypertension    • Hypertonicity of bladder    • Osteoporosis, unspecified    • Other extrapyramidal disease and abnormal movement disorder    • Other specified disorder of intestines     Diarrhea   • Pain     low back,pain scale 7   • Pain     R leg, R hand   • Pneumonia    • Reflux esophagitis    • Restless leg syndrome    • Rheumatoid arthritis (Formerly [facility])    • Rotator cuff (capsule) sprain    • Severe tricuspid regurgitation by prior echocardiogram    • Stroke (Formerly [facility]) August 2013    speech deficit   • Symptomatic menopausal or female climacteric states    • Unspecified hemorrhagic conditions     Nosebleeds   • Unspecified urinary incontinence        SURGICAL HISTORY  Past Surgical History:   Procedure Laterality Date   • CARPAL TUNNEL RELEASE Right 10/20/2015    Procedure: CARPAL TUNNEL RELEASE OPEN;  Surgeon: Ar Macario M.D.;  Location: SURGERY [facility];  Service:    • TRIGGER FINGER RELEASE Right 10/20/2015    Procedure: TRIGGER FINGER RELEASE MIDDLE AND RING;  Surgeon: Ar Macario M.D.;  Location: SURGERY [facility];  Service:    • GASTROSCOPY WITH BALLOON DILATATION  7/25/2013     Performed by Jed Hoyt Jr., M.D. at SURGERY [facility] ORS   • INGUINAL HERNIA REPAIR BILATERAL  3/2/2011    Performed by NIKOLE BELTRAN at SURGERY [facility] ORS   • KNEE ARTHROPLASTY TOTAL  6/21/2010    Performed by INNA BILLS at SURGERY [facility] ORS   • ROTATOR CUFF REPAIR Right 2008    dr bills   • CATARACT PHACO WITH IOL  2007   • MIC BY LAPAROSCOPY  1994   • TONSILLECTOMY  1952   • OTHER  1935    Tube insertion,left lung   • ARTHROSCOPY, KNEE     • CHOLECYSTECTOMY     • OTHER      Epidural    • OTHER      Spinal meningitis   • PB REMV 2ND CATARACT,CORN-SCLER SECTN         FAMILY HISTORY  Family History   Problem Relation Age of Onset   • Heart Disease Mother    • Heart Failure Mother    • Lung Disease Father    • Stroke Sister        SOCIAL HISTORY  Daughter at the bedside    PHYSICAL EXAM  GENERAL: Alert elderly female  VITAL SIGNS: /63   Pulse 68   Temp 36.7 °C (98 °F)   Resp 20   Ht 1.524 m (5')   Wt 54.1 kg (119 lb 4.3 oz)   SpO2 90% Comment: Pt is on 3L home O2  BMI 23.29 kg/m²   Constitutional: Alert healthy-appearing adult   HENT: Scalp is normal size and nontender. Ears are clear. Nose is clear. Throat is clear with no stridor no drooling no trismus. Teeth are all intact.  Eyes: Pupils equal round and reactive to light, extraocular motor fall. There is no scleral icterus.  Neck: Neck is supple and nontender. There is no meningismus. No adenitis. No thyromegaly.  Lymphatic: No adenopathy.   Cardiovascular: Heart regular rhythm without murmurs or gallops   Thorax & Lungs: No chest wall tenderness. Lungs are clear. Patient has good breath sounds bilateral. No rales, no rhonchi, no wheezes.  Abdomen: Abdomen is soft, nontender, not rigid, no guarding, and no organomegaly. There is no palpable hernia   Skin: Warm, pink, and dry with no erythema and no rash.   Back: Nontender, no midline bony tenderness, no flank tenderness.                                              Extremities: Full range of motion.  The patient has significant redness swelling tenderness induration warmth and some early blistering to both distal lower extremities.  Most likely just cellulitis with fluid retention.  Ultrasound will be obtained to rule out DVT.  Neurologic: Alert & oriented . Cranial nerves are grossly intact as tested. Patient moves all 4 extremities well. Patient has good strong flexion and extension of the ankle joints knee joints hip joints and elbow joints. Sensation is normal and symmetrical in the upper and lower extremities.   Psychiatric: Patient is alert oriented coherent and rational.     EKG  EKG is interpreted by me.  Atrial fibrillation.  Occasional PVC.  No specific ST-T wave change.    RADIOLOGY/PROCEDURES  LE VENOUS DUPLEX   Final Result      DX-CHEST-PORTABLE (1 VIEW)   Final Result         1. Stable cardiomegaly. No pulmonary infiltrates or consolidations are noted.      2. Hiatal hernia.              COURSE & MEDICAL DECISION MAKING  Patient presents with gradually increasing redness swelling blistering to her distal lower extremities over the past 10 days.  They are getting worse and not getting better.  2 weeks ago she was fine.  Differential diagnosis: Cellulitis, DVT, congestive heart failure, vascular insufficiency, etc.    Plan: #1 IV #2 lab evaluation including CBC, CMP, pro time, PTT, BNP, blood cultures, lactate level, etc.  This is rule out anemia, infection, bleeding disorders, infection etc.  3.  Ultrasound of the lower legs to rule out DVT #4.  Intravenous vancomycin after blood cultures obtained.  5.  Admission to the hospital after ED evaluation.  6.  Review previous medical records    Review previous medical records: Multiple medical problems including atrial fibrillation, congestive heart failure, coronary disease, previous heart surgery, and a coagulation therapy, upper lipidemia and hypertension.  Medications include Eliquis, Coreg, Prinivil, Lasix,  Crestor.    Laboratory and reexamination: EKG shows atrial fibrillation.  Chest x-ray shows some cardiomegaly.  Lactate level is slightly elevated at 2.3.  NP is elevated at 231 with some mild heart failure.  Ultrasound of the leg shows no DVT.    Results for orders placed or performed during the hospital encounter of 18   Complete Metabolic Panel (CMP)   Result Value Ref Range    Sodium 136 135 - 145 mmol/L    Potassium 3.5 (L) 3.6 - 5.5 mmol/L    Chloride 98 96 - 112 mmol/L    Co2 27 20 - 33 mmol/L    Anion Gap 11.0 0.0 - 11.9    Glucose 132 (H) 65 - 99 mg/dL    Bun 19 8 - 22 mg/dL    Creatinine 1.16 0.50 - 1.40 mg/dL    Calcium 8.5 8.4 - 10.2 mg/dL    AST(SGOT) 31 12 - 45 U/L    ALT(SGPT) 14 2 - 50 U/L    Alkaline Phosphatase 68 30 - 99 U/L    Total Bilirubin 1.0 0.1 - 1.5 mg/dL    Albumin 3.4 3.2 - 4.9 g/dL    Total Protein 7.1 6.0 - 8.2 g/dL    Globulin 3.7 (H) 1.9 - 3.5 g/dL    A-G Ratio 0.9 g/dL   Btype Natriuretic Peptide   Result Value Ref Range    B Natriuretic Peptide 351 (H) 0 - 100 pg/mL   LACTIC ACID   Result Value Ref Range    Lactic Acid 2.3 (H) 0.5 - 2.0 mmol/L   PT/INR   Result Value Ref Range    PT 14.6 12.0 - 14.6 sec    INR 1.15 (H) 0.87 - 1.13   APTT   Result Value Ref Range    APTT 34.3 24.7 - 36.0 sec   ESTIMATED GFR   Result Value Ref Range    GFR If  53 (A) >60 mL/min/1.73 m 2    GFR If Non  44 (A) >60 mL/min/1.73 m 2   EKG - STAT   Result Value Ref Range    Report       [facility]    Test Date:  2018  Pt Name:    OLY AUGUSTIN                   Department: [facility]  MRN:        [id number]                      [facility]  Gender:     Female                       Technician: AAMIR  :        [id number]                   Requested By:GARY GANSERT  Order #:    [id number]                    Reading MD: GARY GANSERT, MD    Measurements  Intervals                                Axis  Rate:       81                            P:  ND:                                      QRS:        72  QRSD:       101                          T:          0  QT:         374  QTc:        434    Interpretive Statements  Atrial fibrillation  Ventricular premature complex  Low voltage, precordial leads  RSR' in V1 or V2, probably normal variant  Nonspecific T abnormalities, anterior leads  Minimal ST elevation, inferior leads  Baseline wander in lead(s) V2  Compared to ECG 03/15/2018 03:30:13  Ventricular premature complex(es) now pre sent  RSR' in V1 or V2 now present  ST (T wave) deviation now present  T-wave abnormality still present    Electronically Signed On 9- 16:35:28 PDT by GARY GANSERT, MD        Hospitalist has been called the case discussed.  Patient stable on admission for evaluation and treatment of the significant cellulitis of both lower legs.  FINAL IMPRESSION  1.  Cellulitis to the lower extremities  2.  Congestive heart failure, hypertension, hyperlipidemia.         Electronically signed by: Gary Gansert, 9/13/2018 /5:30 PM

## 2018-09-13 NOTE — ED NOTES
Assessment complete. IV started. Labs drawn and sent. First set of blood cx's drawn and sent. US at BS. Call light within reach

## 2018-09-13 NOTE — ED NOTES
Barbara Murray 89 y.o. female   Chief Complaint   Patient presents with   • Leg Swelling     Pt reports leg swelling to bilateral lower legs, with redness and open weeping wounds that is new     /63   Pulse 68   Temp 36.7 °C (98 °F)   Resp 20   Ht 1.524 m (5')   Wt 54.1 kg (119 lb 4.3 oz)   SpO2 90% Comment: Pt is on 3L home O2  BMI 23.29 kg/m²     Pt presents with caregiver via home wheelchair. Pt returned to lobby and educated on triage process. Advised to notify RN with changes or concerns.

## 2018-09-14 NOTE — ED NOTES
Floor called and notified of pt transfer to floor. Bartolo MARTIN to call with any questions. Pt leaves this ER with no acute changes.

## 2018-09-14 NOTE — PROGRESS NOTES
Received report from NOC RN; assumed pt care. Pt A&Ox4, ambulate up to bathroom and back to bed. Complains of nausea. Significant redness noted to BLE. Pt state some numbness noted in RT foot. Pt notified to call for assistance. Call light within reach.

## 2018-09-14 NOTE — ASSESSMENT & PLAN NOTE
Hx of COPD on 3L at baseline.  Currently at baseline  Not in acute exacerbation  Continue RT protocol, duo nebs, Pep therapy if warranted, and incentive spirometry.

## 2018-09-14 NOTE — DISCHARGE PLANNING
Admitted for cellulitis. Pending arterial duplex. Wound cultures and BC pending. Pt states she lives at home alone and has caregivers. Uses O2 at home at 3L NC baseline which pt is on now. Pt states she is walking to BR with walker, asked rn to ambulate pt in hallway.

## 2018-09-14 NOTE — ASSESSMENT & PLAN NOTE
Bilateral lower extremity erythematous, weeping, with edema  Continue IV abx for now till cultures are back, monitor vancomycin trough levels and renal function  Wound care continued    Arterial studies ordered: Bilateral lower extremity atherosclerotic disease.     Moderate stenosis of the distal RIGHT femoral artery, and apparent    occlusion RIGHT posterior tibial artery.   Moderate LEFT popliteal and posterior tibial artery stenoses.   Apparent occlusion of the proximal LEFT posterior tibial artery.    Touched base with Dr. Hummel with vascular surgery, patient with 2 vessel, Multiphasic flow, therefore continue above management, no surgery needed    Continue IV abx today, transition to oral tomorrow and monitor  bcx neg thus far

## 2018-09-14 NOTE — PROGRESS NOTES
[facility] Hospitalist Progress Note    Date of Service: 2018    Chief Complaint  89 y.o. female admitted 2018 with b/l LE cellulitis    Interval Problem Update  Patient with hx of 2 CVA in the past, has hx of dysarthria, difficult to understand, feels weak and fatigue, slight pain in b/l LE, otherwise no complaints    Consultants/Specialty  none    Disposition  tbd        Review of Systems   Constitutional: Positive for malaise/fatigue. Negative for fever.   HENT: Positive for hearing loss. Negative for nosebleeds.    Eyes: Negative for blurred vision, double vision and photophobia.   Respiratory: Negative for cough, hemoptysis and shortness of breath.    Cardiovascular: Positive for leg swelling. Negative for chest pain.   Gastrointestinal: Negative for abdominal pain, heartburn, nausea and vomiting.   Genitourinary: Negative for dysuria, frequency and urgency.   Musculoskeletal: Positive for joint pain.   Skin: Positive for rash. Negative for itching.   Neurological: Positive for weakness. Negative for dizziness, tingling and headaches.   Psychiatric/Behavioral: Negative for depression, substance abuse and suicidal ideas.      Physical Exam  Laboratory/Imaging   Hemodynamics  Temp (24hrs), Av.5 °C (97.7 °F), Min:36.3 °C (97.4 °F), Max:36.7 °C (98 °F)   Temperature: 36.5 °C (97.7 °F)  Pulse  Av.2  Min: 49  Max: 98    Blood Pressure : 119/62, NIBP: (!) 161/88      Respiratory      Respiration: 18, Pulse Oximetry: 100 %, O2 Daily Delivery Respiratory : Silicone Nasal Cannula        RUL Breath Sounds: Clear, RML Breath Sounds: Clear, RLL Breath Sounds: Diminished, PAWAN Breath Sounds: Clear, LLL Breath Sounds: Diminished;Fine Crackles    Fluids    Intake/Output Summary (Last 24 hours) at 18 1436  Last data filed at 18 1300   Gross per 24 hour   Intake              480 ml   Output              300 ml   Net              180 ml       Nutrition  Orders Placed This Encounter   Procedures   • Diet  Order Cardiac     Standing Status:   Standing     Number of Occurrences:   1     Order Specific Question:   Diet:     Answer:   Cardiac [6]     Physical Exam   Constitutional: She is oriented to person, place, and time. She appears well-developed and well-nourished.   HENT:   Head: Normocephalic and atraumatic.   Mouth/Throat: Oropharynx is clear and moist. No oropharyngeal exudate.   Eyes: Pupils are equal, round, and reactive to light. Conjunctivae and EOM are normal. No scleral icterus.   Neck: Normal range of motion. Neck supple. No JVD present. No thyromegaly present.   Cardiovascular: Normal rate, regular rhythm and intact distal pulses.    No murmur heard.  Pulmonary/Chest: No respiratory distress. She has no wheezes. She has no rales.   Abdominal: Soft. Bowel sounds are normal. She exhibits no distension. There is no tenderness.   Musculoskeletal: Normal range of motion. She exhibits edema and tenderness.   Lymphadenopathy:     She has no cervical adenopathy.   Neurological: She is alert and oriented to person, place, and time. She has normal reflexes. She displays normal reflexes. She exhibits normal muscle tone.   Skin: Skin is dry. Rash noted. There is erythema. No pallor.   Psychiatric: She has a normal mood and affect. Her behavior is normal.       Recent Labs      09/13/18   1522  09/14/18   0505   WBC  8.2  5.8   RBC  4.59  4.35   HEMOGLOBIN  10.6*  10.3*   HEMATOCRIT  36.2*  34.3*   MCV  78.9*  78.9*   MCH  23.1*  23.7*   MCHC  29.3*  30.0*   RDW  50.8*  51.6*   PLATELETCT  240  213   MPV  10.8  10.8     Recent Labs      09/13/18   1522  09/14/18   0505   SODIUM  136  138   POTASSIUM  3.5*  3.8   CHLORIDE  98  100   CO2  27  29   GLUCOSE  132*  108*   BUN  19  19   CREATININE  1.16  1.05   CALCIUM  8.5  8.1*     Recent Labs      09/13/18   1522   APTT  34.3   INR  1.15*     Recent Labs      09/13/18   1522   BNPBTYPENAT  351*              Assessment/Plan     * Bilateral cellulitis of lower leg    Assessment & Plan    Bilateral lower extremity erythematous, weeping, with edema  Continue IV abx for now till cultures are back, monitor vancomycin trough levels and renal function  Wound care continued  Lasix x 1 and monitor for response    Arterial studies ordered: Bilateral lower extremity atherosclerotic disease.     Moderate stenosis of the distal RIGHT femoral artery, and apparent    occlusion RIGHT posterior tibial artery.   Moderate LEFT popliteal and posterior tibial artery stenoses.   Apparent occlusion of the proximal LEFT posterior tibial artery.          Essential hypertension- (present on admission)   Assessment & Plan    Controlled  Continue home dose of Coreg, Lasix and lisinopril        Chronic anticoagulation- Eliquis (for A.Fib)- (present on admission)   Assessment & Plan    Rate controlled continue eliquis for AC and carvedilol for rate control        Chronic hypoxemic respiratory failure (HCC)   Assessment & Plan    Hx of COPD on 3L at baseline.  Currently at baseline  Not in acute exacerbation  Continue RT protocol, duo nebs, Pep therapy if warranted, and incentive spirometry.           Moderate mitral stenosis by prior echocardiogram- (present on admission)   Assessment & Plan    Per previous echocardiogram, patient has declined any surgical intervention        RLS (restless legs syndrome)- requip- d/c or take prn- (present on admission)   Assessment & Plan    Continue propranolol when necessary        CVA, old, speech/language deficit- 2013- (present on admission)   Assessment & Plan    Speech eval ordered  No acute issues, chronic        Glaucoma- (present on admission)   Assessment & Plan    Continue home timolol drops          Quality-Core Measures   Reviewed items::  Labs reviewed, Medications reviewed and Radiology images reviewed  Wall catheter::  No Wall  Antibiotics:  Treating active infection/contamination beyond 24 hours perioperative coverage

## 2018-09-14 NOTE — H&P
Hospital Medicine History and Physical    Date of Service  9/13/2018    Chief Complaint  Chief Complaint   Patient presents with   • Leg Swelling     Pt reports leg swelling to bilateral lower legs, with redness and open weeping wounds that is new   • Open Wound     Weeping sores to lower legs       History of Presenting Illness  89 y.o. female with a past medical history of Chronic atrial fibrillation on Eliquis, history of coronary artery disease, Dysphagia due to a prior CVA with presented 9/13/2018 to the ER for evaluation of bilateral lower extremities swelling and redness, which is weeping with fluid patient says she has mild pain and has been feeling cold over the past several days however otherwise comfortable. Patient also has a history of a mitral murmur but has elected not to undergo any surgery. At this point patient will be admitted for IV antibiotics for lower extremity cellulitis.      Primary Care Physician  Mahendra Morrow M.D.    Consultants  None    Code Status  Code: DNR/Comfort care    Review of Systems  Review of Systems   Constitutional: Positive for malaise/fatigue. Negative for chills and fever.   HENT: Negative for congestion, hearing loss, sore throat and tinnitus.    Eyes: Negative for blurred vision, double vision, photophobia and pain.   Respiratory: Negative for cough, hemoptysis, sputum production, shortness of breath and stridor.    Cardiovascular: Negative for chest pain, palpitations, orthopnea, claudication and PND.   Gastrointestinal: Negative for blood in stool, constipation, heartburn, melena, nausea and vomiting.   Genitourinary: Negative for dysuria, frequency and urgency.   Musculoskeletal: Positive for myalgias. Negative for back pain and neck pain.   Skin: Positive for rash.   Neurological: Positive for weakness. Negative for dizziness, tingling, tremors, sensory change, speech change and headaches.   Psychiatric/Behavioral: Negative for depression, memory loss and  suicidal ideas. The patient is not nervous/anxious.           Past Medical History  Past Medical History:   Diagnosis Date   • Atrial fibrillation ([facility]) 01/2018    Echocardiogram with normal LV size, mild concentric LVH, LVEF 50%. Severely dilated RA and LA; mildly dilated RV. Moderate MS/mild MR. Mild AS/moderate AI. Moderate TR, RVSP 55mmHg.   • Chronic UTI (urinary tract infection) 3/30/2017   • CAD (coronary artery disease) August 2013    Coronary angiogram with 40-50% stenosis of distal PDA, diagonal ostial 40-50%, RCA ostial 60-70%. LVEF 45-50%   • Stroke ([facility]) August 2013    speech deficit   • Abnormal EKG    • Anesthesia     During cataract procdure, not completely effective   • Arrest of bone development or growth    • Arthritis    • Backpain    • CATARACT     Status post removal   • CHF (congestive heart failure) ([facility])    • Chronic anticoagulation    • Crohns disease ([facility])    • Degeneration of lumbar or lumbosacral intervertebral disc    • Disorders of bursae and tendons in shoulder region, unspecified    • Glaucoma    • Hiatus hernia syndrome    • Hyperlipidemia    • Hypertension    • Hypertonicity of bladder    • Osteoporosis, unspecified    • Other extrapyramidal disease and abnormal movement disorder    • Other specified disorder of intestines     Diarrhea   • Pain     low back,pain scale 7   • Pain     R leg, R hand   • Pneumonia    • Reflux esophagitis    • Restless leg syndrome    • Rheumatoid arthritis ([facility])    • Rotator cuff (capsule) sprain    • Severe tricuspid regurgitation by prior echocardiogram    • Symptomatic menopausal or female climacteric states    • Unspecified hemorrhagic conditions     Nosebleeds   • Unspecified urinary incontinence        Surgical History  Past Surgical History:   Procedure Laterality Date   • CARPAL TUNNEL RELEASE Right 10/20/2015    Procedure: CARPAL TUNNEL RELEASE OPEN;  Surgeon: Ar Macario M.D.;  Location: SURGERY [facility];  Service:    • TRIGGER  FINGER RELEASE Right 10/20/2015    Procedure: TRIGGER FINGER RELEASE MIDDLE AND RING;  Surgeon: Ar Macario M.D.;  Location: SURGERY [facility];  Service:    • GASTROSCOPY WITH BALLOON DILATATION  7/25/2013    Performed by Jed Hoyt Jr., M.D. at SURGERY [facility]   • INGUINAL HERNIA REPAIR BILATERAL  3/2/2011    Performed by NIKOLE BELTRAN at SURGERY [facility]   • KNEE ARTHROPLASTY TOTAL  6/21/2010    Performed by INNA BILLS at SURGERY [facility]   • ROTATOR CUFF REPAIR Right 2008    dr bills   • CATARACT PHACO WITH IOL  2007   • MIC BY LAPAROSCOPY  1994   • TONSILLECTOMY  1952   • OTHER  1935    Tube insertion,left lung   • ARTHROSCOPY, KNEE     • CHOLECYSTECTOMY     • OTHER      Epidural    • OTHER      Spinal meningitis   • PB REMV 2ND CATARACT,CORN-SCLER SECTN         Medications  No current facility-administered medications on file prior to encounter.      Current Outpatient Prescriptions on File Prior to Encounter   Medication Sig Dispense Refill   • apixaban (ELIQUIS) 2.5mg Tab Take 1 Tab by mouth 2 Times a Day. 180 Tab 3   • carvedilol (COREG) 6.25 MG Tab Take 1 Tab by mouth 2 times a day, with meals. 180 Tab 3   • furosemide (LASIX) 40 MG Tab Take 1 Tab by mouth 2 Times a Day. 180 Tab 4   • potassium chloride SA (KDUR) 20 MEQ Tab CR Take 1 Tab by mouth 2 times a day. 60 Tab 11   • lisinopril (PRINIVIL) 5 MG Tab Take 1 Tab by mouth every day. 90 Tab 4   • ferrous sulfate 325 (65 Fe) MG tablet Take 1 Tab by mouth 3 times a day. 100 Tab 3   • timolol PF (TIMOPTIC PF) 0.5 % Solution Place 1 Drop in both eyes 2 Times a Day.     • travoprost (TRAVATAN Z) 0.004 % Solution Place 1 Drop in both eyes 2 Times a Day.         Family History  Family History   Problem Relation Age of Onset   • Heart Disease Mother    • Heart Failure Mother    • Lung Disease Father    • Stroke Sister        Social History  Social History   Substance Use Topics   • Smoking status: Former Smoker      Years: 2.00     Types: Cigarettes     Start date: 1956     Quit date: 1958   • Smokeless tobacco: Never Used      Comment: .5 ppd 9 yrs,quit    • Alcohol use Yes      Comment: rare       Allergies  Allergies   Allergen Reactions   • Pcn [Penicillins] Hives   • Sulfa Drugs Nausea        Physical Exam  Laboratory   Hemodynamics  Temp (24hrs), Av.7 °C (98 °F), Min:36.7 °C (98 °F), Max:36.7 °C (98 °F)   Temperature: 36.7 °C (98 °F)  Pulse  Av.8  Min: 49  Max: 68    Blood Pressure : 116/63, NIBP: 143/75      Respiratory      Respiration: 20, Pulse Oximetry: 93 %             Physical Exam   Constitutional: She is oriented to person, place, and time. She appears well-developed and well-nourished. No distress.   HENT:   Head: Normocephalic and atraumatic.   Mouth/Throat: No oropharyngeal exudate.   Eyes: Pupils are equal, round, and reactive to light. Conjunctivae are normal. Right eye exhibits no discharge. Left eye exhibits no discharge. No scleral icterus.   Neck: Neck supple. No JVD present. No thyromegaly present.   Cardiovascular: Normal rate and intact distal pulses.    Murmur heard.  Irregularly irregular.     Pulmonary/Chest: Effort normal and breath sounds normal. No stridor. No respiratory distress. She has no wheezes. She has no rales.   Diminished breath sounds bilateral lung bases   Abdominal: Soft. Bowel sounds are normal. She exhibits no distension. There is no tenderness. There is no rebound.   Musculoskeletal: Normal range of motion. She exhibits edema.   +2 pitting edema bilateral lower extremities   Neurological: She is alert and oriented to person, place, and time. No cranial nerve deficit.   Skin: Skin is warm. Rash noted. She is not diaphoretic. There is erythema.   Bilateral lower extremity erythema with weeping fluid from bilateral knee to bilateral ankles   Psychiatric: She has a normal mood and affect. Her behavior is normal. Thought content normal.   Nursing note and  vitals reviewed.        Assessment/Plan  * Bilateral cellulitis of lower leg   Assessment & Plan    Bilateral lower extremity erythematous, weeping, with edema  IV vancomycin started per pharmacy dosing  IV ceftriaxone started given allergy to penicillins  Local wound cultures as well as blood cultures pending        Essential hypertension- (present on admission)   Assessment & Plan    Controlled  Resume home dose of Coreg, Lasix and lisinopril        Chronic anticoagulation- Eliquis (for A.Fib)- (present on admission)   Assessment & Plan    Rate controlled continue eliquis for AC and carvedilol for rate control        Chronic hypoxemic respiratory failure (HCC)   Assessment & Plan    Hx of COPD on 3L at baseline.  Currently at baseline  Not in acute exacerbation  Continue RT protocol, duo nebs, Pep therapy if warranted, and incentive spirometry.           Moderate mitral stenosis by prior echocardiogram- (present on admission)   Assessment & Plan    Per previous echocardiogram, patient has declined any surgical intervention        RLS (restless legs syndrome)- requip- d/c or take prn- (present on admission)   Assessment & Plan    Continue propranolol when necessary        CVA, old, speech/language deficit- 2013- (present on admission)   Assessment & Plan    Speech eval ordered  No acute issues, chronic        Glaucoma- (present on admission)   Assessment & Plan    Continue home timolol drops            I anticipate this patient will require at least two midnights for appropriate medical management, necessitating inpatient admission.    Prophylaxis: apixaban    Recent Labs      09/13/18   1522   WBC  8.2   RBC  4.59   HEMOGLOBIN  10.6*   HEMATOCRIT  36.2*   MCV  78.9*   MCH  23.1*   MCHC  29.3*   RDW  50.8*   PLATELETCT  240   MPV  10.8     Recent Labs      09/13/18   1522   SODIUM  136   POTASSIUM  3.5*   CHLORIDE  98   CO2  27   GLUCOSE  132*   BUN  19   CREATININE  1.16   CALCIUM  8.5     Recent Labs       09/13/18   1522   ALTSGPT  14   ASTSGOT  31   ALKPHOSPHAT  68   TBILIRUBIN  1.0   GLUCOSE  132*     Recent Labs      09/13/18   1522   APTT  34.3   INR  1.15*     Recent Labs      09/13/18   1522   BNPBTYPENAT  351*         Lab Results   Component Value Date    TROPONINI 0.02 03/15/2018       Imaging  LE VENOUS DUPLEX   Final Result      DX-CHEST-PORTABLE (1 VIEW)   Final Result         1. Stable cardiomegaly. No pulmonary infiltrates or consolidations are noted.      2. Hiatal hernia.

## 2018-09-14 NOTE — DISCHARGE PLANNING
Care Transition Team Assessment    Patient resides at home and has caregivers 4 days a week at assist her.  Patient reports that she has a FWW and O2 already at home.     Information Source  Orientation : Oriented x 4  Information Given By: Patient  Informant's Name: Barbara  Who is responsible for making decisions for patient? : Patient    Elopement Risk  Legal Hold: No  Ambulatory or Self Mobile in Wheelchair: Yes  Disoriented: No  Psychiatric Symptoms: None  History of Wandering: No  Elopement this Admit: No  Vocalizing Wanting to Leave: No  Displays Behaviors, Body Language Wanting to Leave: No-Not at Risk for Elopement  Elopement Risk: Not at Risk for Elopement    Interdisciplinary Discharge Planning  Does Admitting Nurse Feel This Could be a Complex Discharge?: No  Primary Care Physician: yahir harrison  Lives with - Patient's Self Care Capacity: Alone and Able to Care For Self  Patient or legal guardian wants to designate a caregiver (see row info): No  Support Systems: Friends / Neighbors  Housing / Facility: [address] [place] [address]  Do You Take your Prescribed Medications Regularly: No  Reasons Why Not Taking Medications : Memory Issues (doent like taking some of them)  Able to Return to Previous ADL's: Yes  Mobility Issues: Yes  Prior Services: None  Assistance Needed: Unknown at this Time  Durable Medical Equipment: Home Oxygen, Walker (baseline 3L)    Discharge Preparedness  What is your plan after discharge?: Home with help    Finances  Financial Barriers to Discharge: No  Prescription Coverage: Yes    Vision / Hearing Impairment  Vision Impairment : Yes  Right Eye Vision: Impaired, Wears Glasses  Left Eye Vision: Impaired, Wears Glasses  Hearing Impairment : No    Values / Beliefs / Concerns  Values / Beliefs Concerns : No    Advance Directive  Advance Directive?: DPOA for Health Care, POLST    Domestic Abuse  Have you ever been the victim of abuse or violence?: No    Discharge Risks or Barriers  Discharge risks  or barriers?: No    Anticipated Discharge Information  Anticipated discharge disposition: Home

## 2018-09-14 NOTE — CARE PLAN
Problem: Safety  Goal: Will remain free from injury  Outcome: PROGRESSING AS EXPECTED    Intervention: Provide assistance with mobility  Pt educated to call prior to ambulating to bathroom or in hallways      Problem: Venous Thromboembolism (VTW)/Deep Vein Thrombosis (DVT) Prevention:  Goal: Patient will participate in Venous Thrombosis (VTE)/Deep Vein Thrombosis (DVT)Prevention Measures  Outcome: PROGRESSING AS EXPECTED   09/13/18 2300   OTHER   Risk Assessment Score 1   VTE RISK Moderate   Pharmacologic Prophylaxis Used Apixaban

## 2018-09-14 NOTE — PROGRESS NOTES
Pharmacy Kinetics 89 y.o. female on vancomycin Day # 1    2018    Currently on Vancomycin 1400 mg IV x 1 dose on  at 1615pm    Indication for Treatment: cellulitis- Bilateral lower extremity erythematous, weeping, with edema    Pertinent history per medical record: Admitted on 2018 for 89 y.o. female with a past medical history of Chronic atrial fibrillation on Eliquis, history of coronary artery disease, Dysphagia due to a prior CVA with presented 2018 to the ER for evaluation of bilateral lower extremities swelling and redness, which is weeping with fluid patient says she has mild pain and has been feeling cold over the past several days however otherwise comfortable.    Other antibiotics: ceftriaxone    Allergies: Pcn [penicillins] and Sulfa drugs     Concerns for renal function:  abnormal LFTs, BUN/SCr ratio > 20:1, CHF, obesity, malnutrition/low albumin, hypermetabolic state (SIRS), pressors/hypotension, nephrotoxic drugs(contrast), CPK, etc.): pt with acute renal dysfunction, elevated bnp    Pertinent Cultures to Date:    bc x 2 - pending    Pertinent Images to Date:  None significant    Recent Labs      18   1522   WBC  8.2   NEUTSPOLYS  73.90*   BUN  19   CREATININE  1.16   ALBUMIN  3.4   No intake or output data in the 24 hours ending 18 1901   Blood pressure 116/63, pulse (!) 55, temperature 36.7 °C (98 °F), resp. rate 20, height 1.524 m (5'), weight 54.1 kg (119 lb 4.3 oz), SpO2 93 %. Temp (24hrs), Av.7 °C (98 °F), Min:36.7 °C (98 °F), Max:36.7 °C (98 °F)      A/P   1. Vancomycin dose change: pulse dose, none at this time  2. Next vancomycin level: vanco random on  at 1500 (~24hour random)  3. Goal trough: 12-16 mcg/mL  4. Comments: pt with acute renal dysfunction, f/u on  to determine if scheduled dosing appropriate    Lucy Andrade, PharmD, BCPS

## 2018-09-14 NOTE — CARE PLAN
Problem: Medication  Goal: Compliance with prescribed medication will improve  Outcome: NOT MET  Educated pt on the importance of taking medication, ABX     Problem: Safety  Goal: Will remain free from injury  Outcome: PROGRESSING AS EXPECTED  Bed alarm on at all times

## 2018-09-15 NOTE — THERAPY
"Physical Therapy Evaluation completed.   Bed Mobility:  Supine to Sit: Modified Independent  Transfers: Sit to Stand: Stand by Assist  Gait: Level Of Assist: Stand by Assist with Front-Wheel Walker       Plan of Care: Patient with no further skilled PT needs in the acute care setting at this time  Discharge Recommendations: Equipment: No Equipment Needed. Post-acute therapy Currently anticipate no further skilled therapy needs once patient is discharged from the inpatient setting.    See \"Rehab Therapy-Acute\" Patient Summary Report for complete documentation.   Pt is a 89 y.o.f. with dx LE cellulitis.  Pt lives alone, but she has a CG 6 days per week to help with housekeeping, cooking and transportation.  Pt has a hx of CVA, but pt states her speech was all that was effected.  Pt uses a walker at home.  She is indep with self cares.  Pt presents with LE strength WFL bilat.  Pt ambuated with sba with no LOB or c/o fatique.  Pt demonstrated good safety judgement with O2 cord.  Pt safe with sit to stand.  Pt required assist sit to supine, but pt stated she doesn't sleep in a bed.  She sleeps in a chair.  Pt stated her walking on this date was at her PLOF.  Pt appears to be at PLOF with mobility.  No futher skilled PT needs in acute care setting.  "

## 2018-09-15 NOTE — PROGRESS NOTES
Pharmacy Kinetics 89 y.o. female on vancomycin day # 3 9/15/2018    Currently on Vancomycin 800 mg iv q24hr    Indication for Treatment: cellulitis- Bilateral lower extremity erythematous, weeping, with edema     Pertinent history per medical record: Admitted on 2018 for 89 y.o. female with a past medical history of Chronic atrial fibrillation on Eliquis, history of coronary artery disease, Dysphagia due to a prior CVA with presented 2018 to the ER for evaluation of bilateral lower extremities swelling and redness, which is weeping with fluid patient says she has mild pain and has been feeling cold over the past several days however otherwise comfortable.     Other antibiotics: ceftriaxone     Allergies: Pcn [penicillins] and Sulfa drugs      Concerns for renal function: pt with acute renal dysfunction, elevated BNP, advanced age     Pertinent Cultures to Date:    BCp x 2 - NGTD    Recent Labs      18   1522  18   0505   WBC  8.2  5.8   NEUTSPOLYS  73.90*  67.80     Recent Labs      18   1522  18   0505   BUN  19  19   CREATININE  1.16  1.05   ALBUMIN  3.4  3.0*     Recent Labs      18   1535   VANCORANDOM  12.9     Intake/Output Summary (Last 24 hours) at 09/15/18 0655  Last data filed at 09/15/18 0000   Gross per 24 hour   Intake              970 ml   Output              840 ml   Net              130 ml      Blood pressure 123/85, pulse 83, temperature 36.5 °C (97.7 °F), resp. rate 18, height 1.524 m (5'), weight 54.1 kg (119 lb 4.3 oz), SpO2 92 %, not currently breastfeeding. Temp (24hrs), Av.7 °C (98.1 °F), Min:36.5 °C (97.7 °F), Max:36.9 °C (98.4 °F)      A/P   1. Vancomycin dose change: No change today  2. Next vancomycin level:  17:30  3. Goal trough: 12-16 MCG/ml  4. Comments: Will continue to monitor and adjust dose as needed per protocol.    Kylah Fu

## 2018-09-15 NOTE — WOUND TEAM
"visit  G-code  C-code  kx modifier     1 on 9/15/18  8990/8991       [facility]  Inpatient Services  Initial Wound and Skin Care Evaluation    Admission Date:  9/13/2018   HPI, PMH, SH: Reviewed  Unit where seen by Wound Team: 2205/00    WOUND CONSULT RELATED TO:  Bilateral leg cellulitis     SUBJECTIVE:  \"this has never happened before\"      Self Report / Pain Level:  No report of pain       OBJECTIVE:  Pt in bed, able to lift legs on her own.  Caregiver Monisha at bedside.  Legs KEHINDE    WOUND TYPE, LOCATION, CHARACTERISTICS (Pressure ulcers: location, stage, POA or date identified)    Location and type of wound:left LE cellulitis and multiple small open wounds         Periwound:    Erythema, edema      Drainage:     Scant serous      Tissue Type and %:    Yellow, red     Wound Edges:   Purple flat attached  Odor:     none  Exposed structure(s):   none  S&S of Infection:   receeding      Measurements: Taken 9/15/18  25% of left leg    Vascular:  9/14/18 Vascular Laboratory   CONCLUSIONS   Bilateral lower extremity atherosclerotic disease.     Moderate stenosis of the distal RIGHT femoral artery, and apparent    occlusion RIGHT posterior tibial artery.   Moderate LEFT popliteal and posterior tibial artery stenoses.   Apparent occlusion of the proximal LEFT posterior tibial artery.      Lab Values:    WBC:       WBC   Date/Time Value Ref Range Status   09/14/2018 05:05 AM 5.8 4.8 - 10.8 K/uL Final   05/03/2010 12:03 PM 4.4 4.0 - 10.5 x10E3/uL Final     AIC:      Lab Results   Component Value Date/Time    HBA1C 6.1 (H) 10/10/2013 05:59 AM         INTERVENTIONS BY WOUND TEAM:  Legs cleaned with warm damp wash cloth and 4 in 1 cleanser.  Open wound on left leg irrigated with normal saline.  Right leg was covered with viscopaste strips applied one layer wall paper style.  Covered with rolled gauze.  Instructed pt to have nurse remove dressing if she experiences discomfort.  Discussed leg elevations " strategies with pt and caregiver.  Applied dimethicone ointment to right leg    Dressing selection:  viscopaste         Interdisciplinary consultation:  RN, patient, caregiver Monisha.  Discussed POC and wound care needs at discharge including home care for ongoing wound dressing needs.       EVALUATION:  Pt presented to the ED on 9/13/18 for leg swelling and weeping.  Admitted for bilateral LE cellulitis and given iv abx.  Left leg with multiple open wounds, most of them small and superficial with one area on the anterior aspect of the leg with some slough.  Using viscopaste to provide moisture balance, and to protect the wounds.  Holding off on more aggressive compression at this point due to compromised arterial flow.       Factors affecting wound healing:  CHF, HTN, Chrohns, CVA, RA, arterial insufficiency, infection, edema  Goals:  Steady decrease in wound area and depth weekly     NURSING PLAN OF CARE ORDERS (X):    Dressing changes: See Dressing Care orders:   X  Skin care: See Skin Care orders:   Rectal tube care: See Rectal Tube Care orders:   Other orders:    RSKIN: CURRENT (X) ORDERED (O)  Q shift Jonathan:  X  Q shift pressure point assessments:  X  Pressure redistribution mattress        Waffle overlay  HALLIE      Bariatric HALLIE      Bariatric foam        Heel float boots       Heels floated on pillows      Barrier wipes      Barrier Cream      Barrier paste      Sacral silicone dressing      Silicone O2 tubing      Anchorfast      Trach with Optifoam split foam       Waffle cushion      Rectal tube or BMS      Antifungal tx    Turn q 2 hours   X  Up to chair     Ambulate   PT/OT     Dietician      PO     TF   TPN   NPO   # days   Other       WOUND TEAM PLAN OF CARE (X):   NPWT change 3 x week:        Dressing changes by wound team:       Follow up as needed:    X   Other (explain):    Anticipated discharge plans (X):  SNF:           Home Care:     X - for ongoing wound care to the left leg      Outpatient  Wound Center:            Self Care:            Other:

## 2018-09-15 NOTE — PROGRESS NOTES
Pharmacy Kinetics 89 y.o. female on vancomycin day # 2 2018    Currently on Vancomycin 1400 mg IV x 1 dose on  at 1615pm     Indication for Treatment: cellulitis- Bilateral lower extremity erythematous, weeping, with edema     Pertinent history per medical record: Admitted on 2018 for 89 y.o. female with a past medical history of Chronic atrial fibrillation on Eliquis, history of coronary artery disease, Dysphagia due to a prior CVA with presented 2018 to the ER for evaluation of bilateral lower extremities swelling and redness, which is weeping with fluid patient says she has mild pain and has been feeling cold over the past several days however otherwise comfortable.     Other antibiotics: ceftriaxone     Allergies: Pcn [penicillins] and Sulfa drugs      Concerns for renal function: pt with acute renal dysfunction, elevated BNP, advanced age     Pertinent Cultures to Date:    BCp x 2 - NGTD      Recent Labs      18   1522  18   0505   WBC  8.2  5.8   NEUTSPOLYS  73.90*  67.80     Recent Labs      18   1522  18   0505   BUN  19  19   CREATININE  1.16  1.05   ALBUMIN  3.4  3.0*     Recent Labs      18   1535   VANCORANDOM  12.9     Intake/Output Summary (Last 24 hours) at 18 1725  Last data filed at 18 1300   Gross per 24 hour   Intake              720 ml   Output              300 ml   Net              420 ml      Blood pressure 111/53, pulse 75, temperature 36.7 °C (98.1 °F), resp. rate 18, height 1.524 m (5'), weight 54.1 kg (119 lb 4.3 oz), SpO2 100 %, not currently breastfeeding. Temp (24hrs), Av.6 °C (97.8 °F), Min:36.3 °C (97.4 °F), Max:36.7 °C (98.1 °F)      A/P   1. Vancomycin dose change: 800 mg IV Q24H  2. Next vancomycin level:  1730  3. Goal trough: 12 - 16 mcg/ml  4. Comments: Plan to start Q24H dosing as 24 hour post-dose level reflects adequate clearance.  Will check trough prior to 3rd dose.  Monitor daily per protocol.    R.  Denis PharmD

## 2018-09-15 NOTE — CARE PLAN
Problem: Bowel/Gastric:  Goal: Normal bowel function is maintained or improved  Outcome: PROGRESSING AS EXPECTED   09/14/18 1200 09/14/18 1300   OTHER   Last BM 09/14/18 --    Number of Times Stooled --  1       Problem: Respiratory:  Goal: Respiratory status will improve  Outcome: PROGRESSING AS EXPECTED  Patient is on 3LPM via NC, patient is normally has this level of oxygen at home. O2 Sats WNL

## 2018-09-15 NOTE — PROGRESS NOTES
[facility] Hospitalist Progress Note    Date of Service: 9/15/2018    Chief Complaint  89 y.o. female admitted 2018 with b/l LE cellulitis    Interval Problem Update  Patient with hx of 2 CVA in the past, has hx of dysarthria, difficult to understand, feels weak and fatigue, slight pain in b/l LE, otherwise no complaints    9/15: patient seen this morning, doing well, her caretaker are visiting her so she is happy, LE swelling and erythema improved. No complaints    Consultants/Specialty  none    Disposition  tbd        Review of Systems   Constitutional: Positive for malaise/fatigue. Negative for fever.   HENT: Positive for hearing loss. Negative for nosebleeds.    Eyes: Negative for blurred vision, double vision and photophobia.   Respiratory: Negative for cough, hemoptysis and shortness of breath.    Cardiovascular: Positive for leg swelling. Negative for chest pain.   Gastrointestinal: Negative for abdominal pain, heartburn, nausea and vomiting.   Genitourinary: Negative for dysuria, frequency and urgency.   Musculoskeletal: Positive for myalgias. Negative for joint pain.   Skin: Positive for rash. Negative for itching.   Neurological: Positive for weakness. Negative for dizziness, tingling and headaches.   Psychiatric/Behavioral: Negative for depression, substance abuse and suicidal ideas.      Physical Exam  Laboratory/Imaging   Hemodynamics  Temp (24hrs), Av.7 °C (98 °F), Min:36.5 °C (97.7 °F), Max:36.9 °C (98.4 °F)   Temperature: 36.7 °C (98.1 °F)  Pulse  Av.7  Min: 49  Max: 98    Blood Pressure : 128/65      Respiratory      Respiration: 17, Pulse Oximetry: 95 %        RUL Breath Sounds: Clear, RML Breath Sounds: Diminished, RLL Breath Sounds: Diminished, PAWAN Breath Sounds: Clear, LLL Breath Sounds: Diminished    Fluids    Intake/Output Summary (Last 24 hours) at 09/15/18 1619  Last data filed at 09/15/18 1254   Gross per 24 hour   Intake              810 ml   Output              940 ml   Net              -130 ml       Nutrition  Orders Placed This Encounter   Procedures   • Diet Order Cardiac     Standing Status:   Standing     Number of Occurrences:   1     Order Specific Question:   Diet:     Answer:   Cardiac [6]     Physical Exam   Constitutional: She is oriented to person, place, and time. She appears well-developed and well-nourished.   HENT:   Head: Normocephalic and atraumatic.   Mouth/Throat: Oropharynx is clear and moist. No oropharyngeal exudate.   Eyes: Pupils are equal, round, and reactive to light. Conjunctivae and EOM are normal. No scleral icterus.   Neck: Normal range of motion. Neck supple. No JVD present. No thyromegaly present.   Cardiovascular: Normal rate, regular rhythm and intact distal pulses.    No murmur heard.  Pulmonary/Chest: No respiratory distress. She has no wheezes. She has no rales.   Abdominal: Soft. Bowel sounds are normal. She exhibits no distension. There is no tenderness.   Musculoskeletal: Normal range of motion. She exhibits edema and tenderness.   Lymphadenopathy:     She has no cervical adenopathy.   Neurological: She is alert and oriented to person, place, and time. She has normal reflexes. She exhibits normal muscle tone.   Skin: Skin is dry. Rash noted. There is erythema. No pallor.   Improving erythema and swelling   Psychiatric: She has a normal mood and affect. Her behavior is normal.       Recent Labs      09/13/18   1522  09/14/18   0505   WBC  8.2  5.8   RBC  4.59  4.35   HEMOGLOBIN  10.6*  10.3*   HEMATOCRIT  36.2*  34.3*   MCV  78.9*  78.9*   MCH  23.1*  23.7*   MCHC  29.3*  30.0*   RDW  50.8*  51.6*   PLATELETCT  240  213   MPV  10.8  10.8     Recent Labs      09/13/18   1522  09/14/18   0505   SODIUM  136  138   POTASSIUM  3.5*  3.8   CHLORIDE  98  100   CO2  27  29   GLUCOSE  132*  108*   BUN  19  19   CREATININE  1.16  1.05   CALCIUM  8.5  8.1*     Recent Labs      09/13/18   1522   APTT  34.3   INR  1.15*     Recent Labs      09/13/18   1522    BNPBTYPENAT  351*              Assessment/Plan     * Bilateral cellulitis of lower leg   Assessment & Plan    Bilateral lower extremity erythematous, weeping, with edema  Continue IV abx for now till cultures are back, monitor vancomycin trough levels and renal function  Wound care continued    Arterial studies ordered: Bilateral lower extremity atherosclerotic disease.     Moderate stenosis of the distal RIGHT femoral artery, and apparent    occlusion RIGHT posterior tibial artery.   Moderate LEFT popliteal and posterior tibial artery stenoses.   Apparent occlusion of the proximal LEFT posterior tibial artery.    Touched base with Dr. Hummel with vascular surgery, patient with 2 vessel, Multiphasic flow, therefore continue above management, no surgery needed    Continue IV abx today, transition to oral tomorrow and monitor  bcx neg thus far        Essential hypertension- (present on admission)   Assessment & Plan    Controlled  Continue home dose of Coreg, Lasix and lisinopril        Chronic anticoagulation- Eliquis (for A.Fib)- (present on admission)   Assessment & Plan    Rate controlled continue eliquis for AC and carvedilol for rate control        Chronic hypoxemic respiratory failure (HCC)   Assessment & Plan    Hx of COPD on 3L at baseline.  Currently at baseline  Not in acute exacerbation  Continue RT protocol, duo nebs, Pep therapy if warranted, and incentive spirometry.           Moderate mitral stenosis by prior echocardiogram- (present on admission)   Assessment & Plan    Per previous echocardiogram, patient has declined any surgical intervention        RLS (restless legs syndrome)- requip- d/c or take prn- (present on admission)   Assessment & Plan    Continue propranolol when necessary        CVA, old, speech/language deficit- 2013- (present on admission)   Assessment & Plan      No acute issues, chronic, baseline        Glaucoma- (present on admission)   Assessment & Plan    Continue home timolol  drops          Quality-Core Measures   Reviewed items::  Labs reviewed, Medications reviewed and Radiology images reviewed  Wall catheter::  No Wall  Antibiotics:  Treating active infection/contamination beyond 24 hours perioperative coverage

## 2018-09-15 NOTE — PROGRESS NOTES
"Pt resting in bed. No signs of distress noted. Refused AM meds despite discussion. VSS. No signs of distress noted. Pt states her legs \"look better.\" Denies any N/T. Up to BR, had a BM. Will monitor.   "

## 2018-09-15 NOTE — PROGRESS NOTES
Assumed care of patient from day shift RN. Patient resting in bed with eyes closed. Patient took PM meds but refused evening Senna tabs. Patient had difficulty swallowing medications with pudding. Patient encouraged to call when assistance is needed. Call light in reach, bed in low position, side rails x2. Bed alarm for safety.

## 2018-09-16 NOTE — CARE PLAN
Problem: Safety  Goal: Will remain free from injury  Outcome: PROGRESSING AS EXPECTED  Pt is injury-free at this moment   Fall precautions in place including: bed locked & at lowest position, x2 upper bed rails up, call light & personal belongings within reach  Pt understanding to call for any assistance, especially to the bathroom. Hourly rounding in place    Problem: Infection  Goal: Will remain free from infection  Outcome: PROGRESSING AS EXPECTED  Wound care on board for bilat cellulitis; first dressing changed yesterday and will be changed q48 hours   Pt on IV-abx Vanco 800mg and Rocephin 2g. Pt is afebrile up to this moment   Will continue with plan of care

## 2018-09-16 NOTE — PROGRESS NOTES
Patient had loose stools this AM x2. Will continue to monitor and send sample to lab if warranted.

## 2018-09-16 NOTE — DISCHARGE PLANNING
Received Choice form at 103 from Farhan)  Agency/Facility Name: [facility]  Referral sent per Choice form @ 1733

## 2018-09-16 NOTE — PROGRESS NOTES
Patient discharge teaching completed and IV removed. Care plan reviewed with caregiver Shaila. Pt given prescription for oral antibiotics and set up with home health for dressing changes. Pt has all personal belongings. Discharged home in stable condition with her home oxygen.

## 2018-09-16 NOTE — DISCHARGE INSTRUCTIONS
"          We would like to take this opportunity to thank you for submitting a referral for your patient to continue care with [facility]. Our skilled team is dedicated to helping all patients recover and gain independence in the home setting.            As of 9/16/18, we have accepted the above patient into our service. A [facility] clinician will be out to see the patient within 48 hours to conduct our initial visit. If you have any questions or concerns regarding the patient’s transition to Home Health, please do not hesitate to contact us. We are open for referrals 7 days a week from 8AM to 5PM at [phone].        We look forward to collaborating with you,  [facility] Team    Wound Care: LEFT LEG - nursing to remove dressings, clean legs with damp warm wash cloth, irrigate open areas with normal saline and gauze.  Cut 5 - 12\" strips of viscopaste gauze (in room in orange and white box) and apply to leg so the length of the strip is along the length of the leg.  Cover with rolled gauze.      Discharge Instructions    Discharged to home by car with caregiver. Discharged via wheelchair, hospital escort: Yes.  Special equipment needed: Not Applicable    Be sure to schedule a follow-up appointment with your primary care doctor or any specialists as instructed.     Discharge Plan:   Pneumococcal Vaccine Administered/Refused: Not given - Patient refused pneumococcal vaccine (already immunized per pharmacy)  Influenza Vaccine Indication: Indicated: 65 years and older    I understand that a diet low in cholesterol, fat, and sodium is recommended for good health. Unless I have been given specific instructions below for another diet, I accept this instruction as my diet prescription.   Other diet: Regular    Special Instructions: None    · Is patient discharged on Warfarin / Coumadin?   No     Depression / Suicide Risk    As you are discharged from this [facility], it is important to " learn how to keep safe from harming yourself.    Recognize the warning signs:  · Abrupt changes in personality, positive or negative- including increase in energy   · Giving away possessions  · Change in eating patterns- significant weight changes-  positive or negative  · Change in sleeping patterns- unable to sleep or sleeping all the time   · Unwillingness or inability to communicate  · Depression  · Unusual sadness, discouragement and loneliness  · Talk of wanting to die  · Neglect of personal appearance   · Rebelliousness- reckless behavior  · Withdrawal from people/activities they love  · Confusion- inability to concentrate     If you or a loved one observes any of these behaviors or has concerns about self-harm, here's what you can do:  · Talk about it- your feelings and reasons for harming yourself  · Remove any means that you might use to hurt yourself (examples: pills, rope, extension cords, firearm)  · Get professional help from the community (Mental Health, Substance Abuse, psychological counseling)  · Do not be alone:Call your Safe Contact- someone whom you trust who will be there for you.  · Call your local CRISIS HOTLINE 922-6117 or 964-330-4019  · Call your local Children's Mobile Crisis Response Team [place] (169) 205-3003 or www.[employer]  · Call the toll free National Suicide Prevention Hotlines   · National Suicide Prevention Lifeline 195-018-YOAO (6225)  · National Hope Line Network 800-SUICIDE (908-1593)

## 2018-09-16 NOTE — DISCHARGE PLANNING
Anticipated Discharge Disposition: Home with HH    Action: Met with pt at bedside to obtain choice for HH services. Pt states she's used [facility] in the past and made choice for [facility].   Choice form completed and faxed to [facility]    IMM delivered    Barriers to Discharge: HH acceptance    Plan: F/U with [facility]

## 2018-09-16 NOTE — PROGRESS NOTES
Received report from day shift nurse. Assumed pt care at 1915. Pt is A&Ox4, resting comfortably in bed. Pt on 3L of oxygen via nasal canula. No signs of SOB/respiratory distress. Labs noted, VSS. Pt denied pain. Assessment completed, 2+ pitting edema noted to sudhakar LOW; wound dressing to LLE in place - CDI. Fall precautions in place. Bed locked & at lowest position. Call light and personal belongings within reach. Continue to monitor    Pt accidentally pulled out her IV; found non-stop bleeding to IV site due to pt's on Eliquis for a-fib. New IV inserted to R- UA 20G. Restarted Vanco infusing at 125ml/hr

## 2018-09-16 NOTE — PROGRESS NOTES
Pharmacy Kinetics 89 y.o. female on vancomycin day # 4 2018    Currently on Vancomycin 800 mg iv q24hr    Indication for Treatment: cellulitis- Bilateral lower extremity erythematous, weeping, with edema     Pertinent history per medical record: Admitted on 2018 for 89 y.o. female with a past medical history of Chronic atrial fibrillation on Eliquis, history of coronary artery disease, Dysphagia due to a prior CVA with presented 2018 to the ER for evaluation of bilateral lower extremities swelling and redness, which is weeping with fluid patient says she has mild pain and has been feeling cold over the past several days however otherwise comfortable.     Other antibiotics: ceftriaxone     Allergies: Pcn [penicillins] and Sulfa drugs      Concerns for renal function: pt with acute renal dysfunction, elevated BNP, advanced age     Pertinent Cultures to Date:    BCp x 2 - NGTD    Recent Labs      18   1522  18   0505   WBC  8.2  5.8   NEUTSPOLYS  73.90*  67.80     Recent Labs      18   1522  18   0505   BUN  19  19   CREATININE  1.16  1.05   ALBUMIN  3.4  3.0*     Recent Labs      18   1535   VANCORANDOM  12.9     Intake/Output Summary (Last 24 hours) at 18 0728  Last data filed at 18 0600   Gross per 24 hour   Intake              860 ml   Output              700 ml   Net              160 ml      Blood pressure 126/69, pulse 83, temperature 36.4 °C (97.6 °F), resp. rate 18, height 1.524 m (5'), weight 54.1 kg (119 lb 4.3 oz), SpO2 98 %, not currently breastfeeding. Temp (24hrs), Av.6 °C (97.9 °F), Min:36.4 °C (97.6 °F), Max:36.8 °C (98.2 °F)      A/P   1. Vancomycin dose change: No change today  2. Next vancomycin level:  17:30  3. Goal trough: 12-16 Mcg/ml  4. Comments: Will continue to monitor and adjust dose as needed per protocol.    Kylah Fu

## 2018-09-16 NOTE — PROGRESS NOTES
Pt assisted up to bathroom with CNA. Pt seems to be having difficulty swallowing her breakfast. Pt states that she does not follow any special texture diet at home. Will mention to MD on rounds to see if SLP order is warranted.

## 2018-09-16 NOTE — FACE TO FACE
Face to Face Supporting Documentation - Home Health    The encounter with this patient was in whole or in part the primary reason for home health admission.    Date of encounter:   Patient:                    MRN:                       YOB: 2018  Barbara Murray  [id number]  8/10/1928     Home health to see patient for:  Wound Care    Skilled need for:  Exacerbation of Chronic Disease State b/l LE cellulitis    Skilled nursing interventions to include:  Wound Care    Homebound status evidenced by:  Need the aid of supportive devices such as crutches, canes, wheelchairs or walkers, Require the use of special transportation, Needs the assistance of another person in order to leave the home or Have a condition such that leaving his or her home is medically contraindicated. Leaving home requires a considerable and taxing effort. There is a normal inability to leave the home.    Community Physician to provide follow up care: Mahendra Morrow M.D.     Optional Interventions? No      I certify the face to face encounter for this home health care referral meets the CMS requirements and the encounter/clinical assessment with the patient was, in whole, or in part, for the medical condition(s) listed above, which is the primary reason for home health care. Based on my clinical findings: the service(s) are medically necessary, support the need for home health care, and the homebound criteria are met.  I certify that this patient has had a face to face encounter by myself.  Abigail Wright M.D. - NPI: [id number]

## 2018-09-16 NOTE — DISCHARGE PLANNING
ATTN: Case Management  RE: Referral for Home Health                We would like to take this opportunity to thank you for submitting a referral for your patient to continue care with [facility]. Our skilled team is dedicated to helping all patients recover and gain independence in the home setting.            As of 9/16/18, we have accepted the above patient into our service. A [facility] clinician will be out to see the patient within 48 hours to conduct our initial visit. If you have any questions or concerns regarding the patient’s transition to Home Health, please do not hesitate to contact us. We are open for referrals 7 days a week from 8AM to 5PM at [phone].      We look forward to collaborating with you,  [facility] Team

## 2018-09-17 NOTE — DISCHARGE SUMMARY
Discharge Summary    CHIEF COMPLAINT ON ADMISSION  Chief Complaint   Patient presents with   • Leg Swelling     Pt reports leg swelling to bilateral lower legs, with redness and open weeping wounds that is new   • Open Wound     Weeping sores to lower legs       Reason for Admission  swollen legs     Admission Date  9/13/2018    CODE STATUS  Prior    HPI & HOSPITAL COURSE  89 y.o. female with a past medical history of Chronic atrial fibrillation on Eliquis, history of coronary artery disease, Dysphagia due to a prior CVA with presented 9/13/2018 to the ER for evaluation of bilateral lower extremities swelling and redness, which is weeping with fluid patient says she has mild pain and has been feeling cold over the past several days however otherwise comfortable. Patient also has a history of a mitral murmur but has elected not to undergo any surgery.patient was admitted for IV abx, cultures were negative, labs were monitored, electrolytes were replaced. LE swelling and redness improved. She had Venous duplex which was neg for DVT and had vascular arterial studies which are listed below. The results were discussed with Dr. Mondragon with vascular surgery and it is nonsurgical given she has 2 vessel multiphasic flow. Patient was continue on abx and discharged in stable medical conditions with home health with improvement of her symptoms. Patient understood and agreed with the above plan.       Therefore, she is discharged in good and stable condition to home with organized home healthcare and close outpatient follow-up.    The patient met 2-midnight criteria for an inpatient stay at the time of discharge.    Discharge Date  9/16/2018    FOLLOW UP ITEMS POST DISCHARGE  pcp    DISCHARGE DIAGNOSES  Principal Problem:    Bilateral cellulitis of lower leg POA: Unknown  Active Problems:    Chronic anticoagulation- Eliquis (for A.Fib) POA: Yes    Essential hypertension POA: Yes    Ischemic heart disease due to coronary artery  obstruction (CMS-HCC) - Non STEMI, 8/2013; med mgt; no intervention POA: Yes    Glaucoma POA: Yes    CVA, old, speech/language deficit- 2013 POA: Yes      Overview: August 2013: Left insular cortex acute stroke.    RLS (restless legs syndrome)- requip- d/c or take prn POA: Yes    Moderate mitral stenosis by prior echocardiogram POA: Yes    Chronic hypoxemic respiratory failure (HCC) POA: Unknown  Resolved Problems:    * No resolved hospital problems. *      FOLLOW UP  Future Appointments  Date Time Provider Department Center   10/5/2018 9:40 AM Mahendra Morrow M.D. [facility]   1/8/2019 10:40 AM REGINALDO Hood [facility] None     No follow-up provider specified.    MEDICATIONS ON DISCHARGE     Medication List      START taking these medications      Instructions   clindamycin 150 MG Caps  Commonly known as:  CLEOCIN   Take 2 Caps by mouth 4 times a day for 5 days.  Dose:  300 mg        CONTINUE taking these medications      Instructions   apixaban 2.5mg Tabs  Commonly known as:  ELIQUIS   Take 1 Tab by mouth 2 Times a Day.  Dose:  2.5 mg     carvedilol 6.25 MG Tabs  Commonly known as:  COREG   Take 1 Tab by mouth 2 times a day, with meals.  Dose:  6.25 mg     ferrous sulfate 325 (65 Fe) MG tablet   Take 1 Tab by mouth 3 times a day.  Dose:  325 mg     furosemide 40 MG Tabs  Commonly known as:  LASIX   Take 1 Tab by mouth 2 Times a Day.  Dose:  40 mg     lisinopril 5 MG Tabs  Commonly known as:  PRINIVIL   Take 1 Tab by mouth every day.  Dose:  5 mg     potassium chloride SA 20 MEQ Tbcr  Commonly known as:  Kdur   Take 1 Tab by mouth 2 times a day.  Dose:  20 mEq     ROPINIRole 1 MG Tabs  Commonly known as:  REQUIP   Take 1 mg by mouth every bedtime.  Dose:  1 mg     timolol PF 0.5 % Soln  Commonly known as:  TIMOPTIC PF   Place 1 Drop in both eyes 2 Times a Day.  Dose:  1 Drop     TRAVATAN Z 0.004 % Soln  Generic drug:  travoprost   Place 1 Drop in both eyes 2 Times a Day.  Dose:  1 Drop             Allergies  Allergies   Allergen Reactions   • Pcn [Penicillins] Hives   • Sulfa Drugs Nausea       DIET  No orders of the defined types were placed in this encounter.      ACTIVITY  As tolerated.  Weight bearing as tolerated    CONSULTATIONS  none    PROCEDURES  none    LABORATORY  Lab Results   Component Value Date    SODIUM 138 09/14/2018    POTASSIUM 3.8 09/14/2018    CHLORIDE 100 09/14/2018    CO2 29 09/14/2018    GLUCOSE 108 (H) 09/14/2018    BUN 19 09/14/2018    CREATININE 1.05 09/14/2018    CREATININE 1.01 (H) 05/03/2010    GLOMRATE 53 (L) 05/03/2010        Lab Results   Component Value Date    WBC 5.8 09/14/2018    WBC 4.4 05/03/2010    HEMOGLOBIN 10.3 (L) 09/14/2018    HEMATOCRIT 34.3 (L) 09/14/2018    PLATELETCT 213 09/14/2018        Total time of the discharge process exceeds 40 minutes.

## 2018-09-18 NOTE — TELEPHONE ENCOUNTER
Received referral from [facility]. Medications reviewed against discharge summary. Outbound call to [facility] pharmacy to clarify instructions for Travatan Z - per Anna, prescription reads 1 drop in each eye nightly at bedtime. Outbound call to Barbara to clarify how she was using as medication list in EPIC notes BID dosing. Spoke with patient's caregiver Yadira and advised her to have patient use Travatan Z as prescribed on label - 1 drop in each eye nightly at bedtime. Yadira verbalizes understanding. Updated medication list in [facility].   Interaction noted between lisinopril and potassium for increased risk of hyperkalemia. Patient's last potassium level on 9/14/18 was 3.8 which is WNL. Continue to monitor.     Michelle Ferguson, PharmD

## 2018-09-28 PROBLEM — N17.9 ARF (ACUTE RENAL FAILURE) (HCC): Status: ACTIVE | Noted: 2018-01-01

## 2018-09-28 PROBLEM — E87.6 HYPOKALEMIA: Status: ACTIVE | Noted: 2018-01-01

## 2018-09-28 PROBLEM — J96.11 CHRONIC RESPIRATORY FAILURE WITH HYPOXIA (HCC): Status: ACTIVE | Noted: 2018-01-01

## 2018-09-28 NOTE — ED TRIAGE NOTES
Chief Complaint   Patient presents with   • Weakness     c/o increased weakness   • Loss of Appetite     BP (!) 165/98   Pulse 67   Temp 36.7 °C (98 °F)   Resp 20   Ht 1.524 m (5')   Wt 51.4 kg (113 lb 3.3 oz)   SpO2 91%   BMI 22.11 kg/m²     Pt BIB [facility] from home for c/o increased weakness and loss of appetite, current treatment for LLE wound.  Pt uses 3L oxygen.

## 2018-09-28 NOTE — ED PROVIDER NOTES
ED Provider Note  CHIEF COMPLAINT  Chief Complaint   Patient presents with   • Weakness     c/o increased weakness   • Loss of Appetite       HPI  Barbara Murray is a 89 y.o. female who presents for evaluation of generalized weakness.  The patient has a history of multiple medical problems.  She is accompanied to the emergency department by her caregivers.  She was seen by home health nursing today who felt that she may have increasing heart failure as the practitioner heard crackles in her lungs.  She is a very poor historian she has had a decreased appetite.  She is on home oxygen.  She recently was hospitalized and treated with clindamycin for weeping wounds.  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  She has a history of atrial fibrillation treated with Eliquis  REVIEW OF SYSTEMS  See HPI for further details.  Limited due to patient condition    PAST MEDICAL HISTORY  Past Medical History:   Diagnosis Date   • Abnormal EKG    • Anesthesia     During cataract procdure, not completely effective   • Arrest of bone development or growth    • Arthritis    • Atrial fibrillation (HCC) 01/2018    Echocardiogram with normal LV size, mild concentric LVH, LVEF 50%. Severely dilated RA and LA; mildly dilated RV. Moderate MS/mild MR. Mild AS/moderate AI. Moderate TR, RVSP 55mmHg.   • Backpain    • CAD (coronary artery disease) August 2013    Coronary angiogram with 40-50% stenosis of distal PDA, diagonal ostial 40-50%, RCA ostial 60-70%. LVEF 45-50%   • CATARACT     Status post removal   • CHF (congestive heart failure) ([facility])    • Chronic anticoagulation    • Chronic UTI (urinary tract infection) 3/30/2017   • Crohns disease ([facility])    • Degeneration of lumbar or lumbosacral intervertebral disc    • Disorders of bursae and tendons in shoulder region, unspecified    • Glaucoma    • Hiatus hernia syndrome    • Hyperlipidemia    • Hypertension    •  Hypertonicity of bladder    • Osteoporosis, unspecified    • Other extrapyramidal disease and abnormal movement disorder    • Other specified disorder of intestines     Diarrhea   • Pain     low back,pain scale 7   • Pain     R leg, R hand   • Pneumonia    • Reflux esophagitis    • Restless leg syndrome    • Rheumatoid arthritis ([facility])    • Rotator cuff (capsule) sprain    • Severe tricuspid regurgitation by prior echocardiogram    • Stroke ([facility]) August 2013    speech deficit   • Symptomatic menopausal or female climacteric states    • Unspecified hemorrhagic conditions     Nosebleeds   • Unspecified urinary incontinence        FAMILY HISTORY  Family History   Problem Relation Age of Onset   • Heart Disease Mother    • Heart Failure Mother    • Lung Disease Father    • Stroke Sister        SOCIAL HISTORY  Social History     Social History   • Marital status: Single     Spouse name: N/A   • Number of children: N/A   • Years of education: N/A     Social History Main Topics   • Smoking status: Former Smoker     Years: 2.00     Types: Cigarettes     Start date: 1/1/1956     Quit date: 11/19/1958   • Smokeless tobacco: Never Used      Comment: .5 ppd 9 yrs,quit 1966   • Alcohol use Yes   • Drug use: No   • Sexual activity: Not Currently     Birth control/ protection: Post-Menopausal     Other Topics Concern   • Not on file     Social History Narrative   • No narrative on file       SURGICAL HISTORY  Past Surgical History:   Procedure Laterality Date   • CARPAL TUNNEL RELEASE Right 10/20/2015    Procedure: CARPAL TUNNEL RELEASE OPEN;  Surgeon: Ar Macario M.D.;  Location: [facility];  Service:    • TRIGGER FINGER RELEASE Right 10/20/2015    Procedure: TRIGGER FINGER RELEASE MIDDLE AND RING;  Surgeon: Ar Macario M.D.;  Location: [facility];  Service:    • GASTROSCOPY WITH BALLOON DILATATION  7/25/2013    Performed by Jed Hoyt Jr., M.D. at [facility]   • INGUINAL  HERNIA REPAIR BILATERAL  3/2/2011    Performed by NIKOLE BELTRAN at SURGERY [facility] ORS   • KNEE ARTHROPLASTY TOTAL  6/21/2010    Performed by INNA BILLS at SURGERY [facility] ORS   • ROTATOR CUFF REPAIR Right 2008    dr bills   • CATARACT PHACO WITH IOL  2007   • MIC BY LAPAROSCOPY  1994   • TONSILLECTOMY  1952   • OTHER  1935    Tube insertion,left lung   • ARTHROSCOPY, KNEE     • CHOLECYSTECTOMY     • OTHER      Epidural    • OTHER      Spinal meningitis   • PB REMV 2ND CATARACT,CORN-SCLER SECTN         CURRENT MEDICATIONS  Home Medications     Reviewed by Kim Holt (Pharmacy Tech) on 09/28/18 at 1425  Med List Status: Complete   Medication Last Dose Status   apixaban (ELIQUIS) 2.5mg Tab 9/28/2018 Active   carvedilol (COREG) 6.25 MG Tab UNK Active   clindamycin (CLEOCIN) 150 MG Cap 9/23/2018 Active   ferrous sulfate 325 (65 Fe) MG tablet UNK Active   furosemide (LASIX) 40 MG Tab 9/28/2018 Active   lisinopril (PRINIVIL) 5 MG Tab UNK Active   potassium chloride SA (KDUR) 20 MEQ Tab CR UNK Active   ROPINIRole (REQUIP) 1 MG Tab 9/28/2018 Active   timolol PF (TIMOPTIC PF) 0.5 % Solution UNK Active   tramadol (ULTRAM) 50 MG Tab UNK Active   travoprost (TRAVATAN Z) 0.004 % Solution UNK Active                 ALLERGIES  Allergies   Allergen Reactions   • Penicillins Hives   • Sulfa Drugs Nausea       PHYSICAL EXAM  VITAL SIGNS: BP (!) 165/98   Pulse 67   Temp 36.7 °C (98 °F)   Resp 20   Ht 1.524 m (5')   Wt 51.4 kg (113 lb 3.3 oz)   SpO2 91%   BMI 22.11 kg/m²   Constitutional : Thin elderly female appears somewhat drowsy, No acute distress, Non-toxic appearance.   HENT: Atraumatic normocephalic slightly dry mucous membranes, oxygen cannula in place  Eyes: Normal-appearing no evidence of infectious process  Neck: Normal range of motion, No tenderness, Supple, No stridor.   Lymphatic: No cervical adenopathy is noted.   Cardiovascular: Irregular irregular rate and rhythm no murmurs, No  rubs, No gallops.   Thorax & Lungs: Decreased breath sounds bilaterally some crackles at the lung bases  Skin: Warm, Dry, No erythema, No rash.   Extremities appears to have vascular insufficiency to both feet left leg has a bandaged wound no obvious acute cellulitis noted  Neurologically she is awake she is alert she is oriented to place and time,  Back is nontender  Psychiatric no impairment of judgment appropriate mood and affect    Results for orders placed or performed during the hospital encounter of 09/28/18   CBC with Differential   Result Value Ref Range    WBC 6.7 4.8 - 10.8 K/uL    RBC 5.02 4.20 - 5.40 M/uL    Hemoglobin 11.7 (L) 12.0 - 16.0 g/dL    Hematocrit 39.0 37.0 - 47.0 %    MCV 77.7 (L) 81.4 - 97.8 fL    MCH 23.3 (L) 27.0 - 33.0 pg    MCHC 30.0 (L) 33.6 - 35.0 g/dL    RDW 50.0 35.9 - 50.0 fL    Platelet Count 267 164 - 446 K/uL    MPV 10.9 9.0 - 12.9 fL    Neutrophils-Polys 74.50 (H) 44.00 - 72.00 %    Lymphocytes 13.20 (L) 22.00 - 41.00 %    Monocytes 9.40 0.00 - 13.40 %    Eosinophils 0.70 0.00 - 6.90 %    Basophils 1.30 0.00 - 1.80 %    Immature Granulocytes 0.90 0.00 - 0.90 %    Nucleated RBC 0.00 /100 WBC    Neutrophils (Absolute) 4.98 2.00 - 7.15 K/uL    Lymphs (Absolute) 0.88 (L) 1.00 - 4.80 K/uL    Monos (Absolute) 0.63 0.00 - 0.85 K/uL    Eos (Absolute) 0.05 0.00 - 0.51 K/uL    Baso (Absolute) 0.09 0.00 - 0.12 K/uL    Immature Granulocytes (abs) 0.06 0.00 - 0.11 K/uL    NRBC (Absolute) 0.00 K/uL   Complete Metabolic Panel (CMP)   Result Value Ref Range    Sodium 138 135 - 145 mmol/L    Potassium 2.9 (L) 3.6 - 5.5 mmol/L    Chloride 99 96 - 112 mmol/L    Co2 30 20 - 33 mmol/L    Anion Gap 9.0 0.0 - 11.9    Glucose 105 (H) 65 - 99 mg/dL    Bun 26 (H) 8 - 22 mg/dL    Creatinine 1.29 0.50 - 1.40 mg/dL    Calcium 8.4 8.4 - 10.2 mg/dL    AST(SGOT) 27 12 - 45 U/L    ALT(SGPT) 14 2 - 50 U/L    Alkaline Phosphatase 62 30 - 99 U/L    Total Bilirubin 1.0 0.1 - 1.5 mg/dL    Albumin 3.7 3.2 - 4.9 g/dL     Total Protein 7.4 6.0 - 8.2 g/dL    Globulin 3.7 (H) 1.9 - 3.5 g/dL    A-G Ratio 1.0 g/dL   Btype Natriuretic Peptide (BNP)   Result Value Ref Range    B Natriuretic Peptide 507 (H) 0 - 100 pg/mL   Prothrombin Time (PT/INR)   Result Value Ref Range    PT 17.9 (H) 12.0 - 14.6 sec    INR 1.50 (H) 0.87 - 1.13   APTT   Result Value Ref Range    APTT 39.2 (H) 24.7 - 36.0 sec   Lipase   Result Value Ref Range    Lipase 34 7 - 58 U/L   Troponin STAT   Result Value Ref Range    Troponin I 0.04 0.00 - 0.04 ng/mL   ESTIMATED GFR   Result Value Ref Range    GFR If  47 (A) >60 mL/min/1.73 m 2    GFR If Non  39 (A) >60 mL/min/1.73 m 2   EKG   Result Value Ref Range    Report       [facility] Emergency Dept.    Test Date:  2018  Pt Name:    OLY AUGUSTIN                   Department: [facility]  MRN:        [id number]                      [facility]  Gender:     Female                       Technician: HRR  :        [id number]                   Requested By:ELVA RUBIN  Order #:    [id number]                    Reading MD:    Measurements  Intervals                                Axis  Rate:       83                           P:  SC:                                      QRS:        79  QRSD:       86                           T:          11  QT:         412  QTc:        485    Interpretive Statements  ATRIAL FIBRILLATION, V-RATE  72- 94  MINIMAL ST DEPRESSION, LATERAL LEADS  Compared to ECG 2018 15:32:21  Ventricular premature complex(es) no longer present  T-wave abnormality no longer present  ST (T wave) deviation still present       EKG interpretation twelve-lead tracing atrial fibrillation ventricular rate 72-94 no acute ST elevation, abnormal SC interval impression abnormal EKG    DX-CHEST-PORTABLE (1 VIEW)   Final Result      1.  Stable enlarged cardiac silhouette. There is no acute failure or edema.            COURSE & MEDICAL DECISION  MAKING  Pertinent Labs & Imaging studies reviewed. (See chart for details)  The patient presenting for evaluation of weakness, concern regarding possible heart failure.  She had a workup performed which shows evidence of elevated BNP probably indicative of mild fluid retention could be contributing to some shortness of breath.  The patient is also noted to have hypokalemia with potassium 2.9.  She is very weak she is not stable for discharge.  She may benefit with diuresis, potassium replacement.  She will be admitted to the hospital staff for management of mild BNP elevation.  She has no evidence of urinary tract infection or pneumonia  FINAL IMPRESSION  1.  Hypokalemia  2.  Generalized weakness  3.      Electronically signed by: Doe Villafuerte, 9/28/2018

## 2018-09-28 NOTE — ED NOTES
Mini cath procedure explained and performed by female RN with chaperone. Privacy maintained for pt. Pt tolerated procedure well. Warm blanket provided. Will continue to monitor.

## 2018-09-28 NOTE — ED NOTES
Med rec complete per patient and family at [facility]  Allergies reviewed  Patient completed a 5 day Clindamycin course 9-23-18     Patient stated she is only taking 3 medications regularly, all the other she takes when she remembers, if she remembers. Could not tell me last doses on most medications

## 2018-09-29 NOTE — PROGRESS NOTES
Dr. Landaverde notified pt has not voided since arrival to the floor and current bladder scan shows 448 ml. Pt still reports no urge to void. New orders received. See MAR, and rescan at 0600 if no void.     Flomax order alerted me to a contraindication according to Sulfa allergy. Sulfa reaction was nausea. Per Marcel in central pharmacy, ok to give.

## 2018-09-29 NOTE — PALLIATIVE CARE
Palliative Care follow-up  Consult received and EMR reviewed. PC RN visited bedside and RN present providing care and asked this RN to return; PC RN agreed.    Plan: f/u when pt able    Thank you for allowing Palliative Care to participate in this patient's care. Please feel free to call [phone] with any questions or concerns.

## 2018-09-29 NOTE — DIETARY
Nutrition services: Day 0 of admit. Barbara Murray is a 89 y.o. female with admitting DX of FTT and dehydration.    Pt positive for poor PO r/t loss off appetite, as well as wt loss PTA. Pt busy this morning with nursing staff. The pt's caregiver at bedside helped to provide the following information. Pt has had less and less desire to eat over the last few weeks. In addition to this, she is apparently a 'picky eater' which makes it more difficult to find nutritious foods that she will accept. At home she was eating almost exclusively fast food until she became unable to leave the house and then friends began bringing food to her. Discussed mechanism for Na+ and fluid retention, and that SOB and increased work of breathing really affect appetite. They state that she has been gaining wt rapidly lately - likely fluid related. Discussed food preferences and a few protein-centered options were obtained. Pt may like Boost - unable to ask pt directly today. Will trial with her lunch daily and see if she likes it. Pt is currently slightly underweight for age >66yo. PO intake has been <25% of breakfast and 50-75% of dinner last night - not adequate at this time. Will CTM.    Assessment:  Height: 152.4 cm (5')  Weight: 52 kg (114 lb 10.2 oz)  Body mass index is 22.39 kg/m² - slightly underweight for age  Diet/Intake: 2g sodium, PO <25% x1, 50-75% x1 so far    Evaluation:   Labs: BUN 26, Cr 1.42, Na+ 138  Meds: Lisinopril, Kdur, Pericolace, (PRN: Miralax, MOM, Dulcolax, Zofran)  Fluids: None currently  Skin: Intact - no skin breakdown noted  GI: last BM 9/28  I/O's: +60mL x24 hours (no output yet today)    Malnutrition Risk: Underweight for age, recent decline in appetite/PO intake of meals.    Recommendations/Plan:  1. Continue current diet. Boost Plus 1x day with lunch to gauge pt acceptance. Protein centered snacks BID between meals.   2. Encourage intake of meals/supplements/snacks  3. Document intake of all  meals/supplements/snacks  as % taken in ADL's to provide interdisciplinary communication across all shifts.   4. Monitor weight.  5. Nutrition rep will continue to see patient for ongoing meal and snack preferences.     RD monitoring.

## 2018-09-29 NOTE — PROGRESS NOTES
Adjusted pt in bed, pt requested more water at this time. Assessment completed, declines pain and refused medication.

## 2018-09-29 NOTE — PROGRESS NOTES
Gave bedside report to MARIO Vázquez and Liyah. Plan of care discussed. Safety precautions in place. Personal belongings and call light are with in reach. Patient sleeping at this time; respirations equal and unlabored.

## 2018-09-29 NOTE — CARE PLAN
Problem: Communication  Goal: The ability to communicate needs accurately and effectively will improve    Intervention: [place] patient and significant other/support system to call light to alert staff of needs   09/29/18 0847   OTHER   Oriented to: All of the Following : Location of Bathroom, Visiting Policy, Unit Routine, Call Light and Bedside Controls, Bedside Rail Policy, Smoking Policy, Rights and Responsibilities, Bedside Report, and Patient Education Notebook     re  Intervention: Reorient patient to environment as needed   09/29/18 0847   OTHER   Oriented to: All of the Following : Location of Bathroom, Visiting Policy, Unit Routine, Call Light and Bedside Controls, Bedside Rail Policy, Smoking Policy, Rights and Responsibilities, Bedside Report, and Patient Education Notebook     Reoriented patient to room, calls lights and bed alarm.       Problem: Safety  Goal: Will remain free from falls    Intervention: Implement fall precautions   09/29/18 0847   OTHER   Environmental Precautions Treaded Slipper Socks on Patient;Report Given to Other Health Care Providers Regarding Fall Risk;Mobility Assessed & Appropriate Sign Placed;Personal Belongings, Wastebasket, Call Bell etc. in Easy Reach;Bed in Low Position;Communication Sign for Patients & Families;Transferred to [facility] Side   IV Pole on Same Side of Bed as Bathroom Yes   Chair/Bed Strip Alarm Yes - Alarm On

## 2018-09-29 NOTE — CONSULTS
"Reason for PC Consult: Advance Care Planning    Consulted by: Dr. Huffman    Assessment:  General: 89 y/o from home with increased FTT, increased weakness, and lethargy. Recent admission for cellulitis and on ABX at home. PMHx of CAD, GERD, glaucoma, HTN, CHF (EF 50%), afib, Crohn's, COPD (on 3LPM ATC), CVA, expressive aphasia. MD concerns for \"end-stage heart failure\" and \"end-stage lung disease\"    Social: Lives alone with exception of caregivers M-F 9-3; no children- POAs are friends    Consults: n/a    Dyspnea: Yes- on O2 @3LPM; visible WOB  Last BM: 18-    Pain: No-    Depression: Mood appropriate for situation-    Dementia: No;       Spiritual:  Is Roman Catholic or spirituality important for coping with this illness? No-    Has a  or spiritual provider visit been requested? No    Palliative Performance Scale: 40%    Advance Directive: Advance Directive-    DPOA: Yes- Hyun Galloway (Stephanie Byrne DPOA is )  POLST: Yes- DNR/comfort    Code Status: DNR-      Outcome:  PC RN met with Barbara at bedside and explained the role of PC. She was sitting up in bed, eating lunch. She was okay with a conversation at this time. She appeared weak, and had difficulty articulating her thoughts/wishes d/t her expressive aphasia. She states she has caregivers at home and states they are there Mon, Weds, Fri (though it's more per Yadira) but was unable to tell this RN what they do. She states Yadira ([phone]) is one of her caregivers and Karen is the other. She gave this RN permission to call Yadira for updates. PC RN inquired about her goals and whether she would want to continue to return to the hospital if further problems occurred or stay at home for her care and she stated \"I suppose I'd just have to come back.\" PC RN explained the role of hospice to help manage her EoL journey at home vs returning to the hospital for interventions. She referred to this RN as a \"finalist\" given the discussion of " "hospice and EoL. PC RN assured her the intent was for her to have the information to make a decision for herself. She states that her goal at this time is to \"eat lunch\" and made a plan for this RN to call Yadira.    Call placed to Yadira and discussed their role in Barbara's home and her perspective of Barbara's medical situation. She states that up till this last admission, she was doing great, but currently she's eating less, sleeping more, easily fatigued and not able to do the things she once was. She states Barbara has 3 caregivers in the home M-F from about [phone] to help with cleaning, appointments, meals, etc. Barbara was responsible for feeding her dog but hasn't been able to do that the last week. She states that a wound care RN was there recently too and discussed comfort in the home via hospice and she stated \"No\" at that time. Yadira sees an obvious decline and feels that one of Barbara's goals will be to be in her home for the remainder of her life, not in a facility. PC RN expressed that her living alone at this time is not safe and options would be further considered after she's seen by PT/OT.     While talking to Barbara and Yadira, PC RN provided active listening, normalization, validation of thoughts and feelings, as well as reinforced her goals of care. PC contact information provided to Yadira and encouraged to call with any questions or concerns.     Updated: KAYE RN/MD    Plan: TBD- home/hospice vs SNF?    Recommendations: I do not recommend an ethics or hospice consult at this time because undetermined POC; pending PT/OT.    Thank you for allowing Palliative Care to participate in this patient's care. Please feel free to call [phone] with any questions or concerns.  "

## 2018-09-29 NOTE — PROGRESS NOTES
Assessment complete. Pt drowsy but rouses easily. Pt reports no pain or discomfort. Pt has expresses aphasia but responds appropriately to yes/no questions. Safety precautions in place. Call light in reach.

## 2018-09-29 NOTE — PROGRESS NOTES
[facility] Hospitalist Progress Note    Date of Service: 2018    Chief Complaint  89 y.o. female admitted 2018 with worsening oral intake and increased weakness    Interval Problem Update  Patient in bed with her friends at bedside, patient is poor historian, states she is feeling fine, no increased SOB, feeling more weak, admits to po poor intake  Otherwise no complaints  No further diarrhea    Consultants/Specialty  palliative    Disposition  tbd        Review of Systems   Constitutional: Positive for malaise/fatigue. Negative for diaphoresis and fever.   HENT: Negative for ear discharge, hearing loss and nosebleeds.    Eyes: Negative for blurred vision, double vision and pain.   Respiratory: Negative for cough, sputum production and shortness of breath.    Cardiovascular: Negative for chest pain, claudication and leg swelling.   Gastrointestinal: Negative for abdominal pain, diarrhea and heartburn.   Genitourinary: Negative for dysuria, flank pain and frequency.   Musculoskeletal: Positive for joint pain and myalgias.   Skin: Negative for itching and rash.   Neurological: Positive for weakness. Negative for dizziness, tingling, tremors and headaches.   Psychiatric/Behavioral: Negative for depression, substance abuse and suicidal ideas.      Physical Exam  Laboratory/Imaging   Hemodynamics  Temp (24hrs), Av.4 °C (97.5 °F), Min:36.2 °C (97.2 °F), Max:36.7 °C (98 °F)   Temperature: 36.4 °C (97.5 °F)  Pulse  Av.3  Min: 45  Max: 84    Blood Pressure : 103/47      Respiratory      Respiration: 18, Pulse Oximetry: 94 %        RUL Breath Sounds: Clear, RML Breath Sounds: Diminished, RLL Breath Sounds: Diminished, PAWAN Breath Sounds: Clear, LLL Breath Sounds: Diminished    Fluids    Intake/Output Summary (Last 24 hours) at 18 1109  Last data filed at 18 0900   Gross per 24 hour   Intake             1260 ml   Output                0 ml   Net             1260 ml       Nutrition  Orders Placed This  Encounter   Procedures   • Diet Order 2 Gram Sodium     Standing Status:   Standing     Number of Occurrences:   1     Order Specific Question:   Diet:     Answer:   2 Gram Sodium [7]     Physical Exam   Constitutional: She is oriented to person, place, and time. She appears well-developed and well-nourished. No distress.   HENT:   Head: Normocephalic and atraumatic.   Mouth/Throat: No oropharyngeal exudate.   Dry mucous membranes   Eyes: Pupils are equal, round, and reactive to light. Conjunctivae and EOM are normal. No scleral icterus.   Neck: Normal range of motion. Neck supple. No JVD present.   Cardiovascular: Normal rate and regular rhythm.    No murmur heard.  Pulmonary/Chest: Effort normal and breath sounds normal. No respiratory distress. She has no wheezes. She exhibits no tenderness.   Abdominal: Soft. Bowel sounds are normal. She exhibits no distension. There is no tenderness.   Musculoskeletal: She exhibits no deformity.   Weakness globally   Lymphadenopathy:     She has no cervical adenopathy.   Neurological: She is alert and oriented to person, place, and time. She exhibits normal muscle tone.   Skin: Skin is warm and dry. No rash noted. No erythema. No pallor.   Psychiatric: She has a normal mood and affect.       Recent Labs      09/28/18   1400   WBC  6.7   RBC  5.02   HEMOGLOBIN  11.7*   HEMATOCRIT  39.0   MCV  77.7*   MCH  23.3*   MCHC  30.0*   RDW  50.0   PLATELETCT  267   MPV  10.9     Recent Labs      09/28/18   1400  09/29/18   0520   SODIUM  138  138   POTASSIUM  2.9*  4.0   CHLORIDE  99  101   CO2  30  33   GLUCOSE  105*  105*   BUN  26*  26*   CREATININE  1.29  1.42*   CALCIUM  8.4  8.6     Recent Labs      09/28/18   1400   APTT  39.2*   INR  1.50*     Recent Labs      09/28/18   1400   BNPBTYPENAT  507*              Assessment/Plan     Chronic right heart failure (HCC)- (present on admission)   Assessment & Plan    Patient has aortic stenosis with right heart failure and severe pulmonary  hypertension, her EF is relatively preserved at 50%  Hydrate cautiously  Fu outpatient        Moderate to severe pulmonary hypertension- (present on admission)   Assessment & Plan     pulmonary fibrosis based on her exam  Continue supplemental oxygen        Chronic respiratory failure with hypoxia (HCC)   Assessment & Plan    Continue supplemental oxygen, no BL O2        Hypokalemia   Assessment & Plan    Replace and monitor        ARF (acute renal failure) ([facility])   Assessment & Plan    secondary to overdiuresis coupled with poor cardiac output, n ohx of CKD  Hydrate cautiously  [place] persistent with worsening Cr now  monitor          Quality-Core Measures   Reviewed items::  Labs reviewed, Radiology images reviewed and Medications reviewed  Wall catheter::  No Wall

## 2018-09-29 NOTE — CARE PLAN
Problem: Nutritional:  Goal: Achieve adequate nutritional intake  Patient will consume >/= 50% of meals  Outcome: PROGRESSING SLOWER THAN EXPECTED

## 2018-09-29 NOTE — PROGRESS NOTES
Received bedside report from Sonal Demarco RN. Assumed care of pt who is resting in bed with eyes closed.  RR even/unlabored. Fall protocol in place. CLIP. Will continue to monitor.

## 2018-09-29 NOTE — H&P
Hospital Medicine History & Physical Note    Date of Service  9/28/2018    Primary Care Physician  Mahendra Morrow M.D.    Consultants  none    Code Status  DNR/ DNI  Patient has failure to thrive with end-stage heart and lung disease  Palliative care consultation has been requested as she may be hospice appropriate    Chief Complaint  Weakness and poor intake    History of Presenting Illness  89 y.o. female who presented 9/28/2018 with worsening oral intake and increased weakness and lethargy.  The patient was apparently discharged 2 weeks ago and went home with caregivers, she was apparently eating well prior to leaving the hospital but since she went home her appetite has been less and less and she is been getting more and more weak and lethargic, she had some diarrhea initially after taking antibiotics for cellulitis but that has subsided but they attribute this to the fact that she really has not been eating much of anything, she has had no fever but she has been cold she has had no sweats or dizziness, she denies any headache.  She has no chest pain but she admits to occasional shortness of breath.  She is chronically on 3 L nasal cannula.  She has occasional abdominal pain.  She complains of nausea after her morning meds but this is not unusual for her.  There has been no emesis.  She has cough that is occasionally productive of sputum but again this is her baseline.  She denies dysuria. the patient is very lethargic but a reasonable historian, however some of the history is obtained from her caregivers who are at bedside.    Review of Systems  Review of Systems   Constitutional: Positive for chills, malaise/fatigue and weight loss. Negative for diaphoresis and fever.   HENT: Negative for congestion, hearing loss, sinus pain and sore throat.    Eyes: Negative for pain and redness.   Respiratory: Positive for cough, sputum production and shortness of breath. Negative for wheezing.    Cardiovascular: Negative  for chest pain and palpitations.   Gastrointestinal: Positive for abdominal pain and nausea. Negative for blood in stool, constipation, diarrhea (Due to antibiotics improving), melena and vomiting.   Genitourinary: Negative for dysuria.   Musculoskeletal: Negative for back pain and neck pain.   Skin: Positive for itching (Left leg). Negative for rash.   Neurological: Positive for weakness. Negative for dizziness, focal weakness and headaches.   Psychiatric/Behavioral: Negative for depression. The patient is not nervous/anxious and does not have insomnia.        Past Medical History   has a past medical history of Abnormal EKG; Anesthesia; Arrest of bone development or growth; Arthritis; Atrial fibrillation ([facility]) (01/2018); Backpain; CAD (coronary artery disease) (August 2013); CATARACT; CHF (congestive heart failure) ([facility]); Chronic anticoagulation; Chronic UTI (urinary tract infection) (3/30/2017); COPD (chronic obstructive pulmonary disease) ([facility]); Crohns disease ([facility]); Degeneration of lumbar or lumbosacral intervertebral disc; Disorders of bursae and tendons in shoulder region, unspecified; Glaucoma; Hiatus hernia syndrome; Hyperlipidemia; Hypertension; Hypertonicity of bladder; Osteoporosis, unspecified; Other extrapyramidal disease and abnormal movement disorder; Other specified disorder of intestines; Pain; Pain; Pneumonia; Reflux esophagitis; Restless leg syndrome; Rotator cuff (capsule) sprain; Severe tricuspid regurgitation by prior echocardiogram; Stroke ([facility]) (August 2013); Symptomatic menopausal or female climacteric states; Unspecified hemorrhagic conditions; and Unspecified urinary incontinence. She also has no past medical history of Angina; ASTHMA; Bronchitis; Cancer ([facility]); Diabetes ([facility]); Dialysis; Fall; Jaundice; Other specified symptom associated with female genital organs; Pacemaker; Personal history of venous thrombosis and embolism; Psychiatric problem; Rheumatic fever; or Unspecified disorder  of thyroid.    Surgical History   has a past surgical history that includes tonsillectomy (1952); paulino by laparoscopy (1994); cataract phaco with iol (2007); rotator cuff repair (Right, 2008); other; other (1935); other; knee arthroplasty total (6/21/2010); inguinal hernia repair bilateral (3/2/2011); gastroscopy with balloon dilatation (7/25/2013); carpal tunnel release (Right, 10/20/2015); trigger finger release (Right, 10/20/2015); cholecystectomy; pr remv 2nd cataract,corn-scler sectn; and arthroscopy, knee.     Family History  family history includes Heart Disease in her mother; Heart Failure in her mother; Lung Disease in her father; Stroke in her sister.     Social History   reports that she quit smoking about 59 years ago. Her smoking use included Cigarettes. She started smoking about 62 years ago. She quit after 2.00 years of use. She has never used smokeless tobacco. She reports that she does not drink alcohol or use drugs.    Allergies  Allergies   Allergen Reactions   • Penicillins Hives   • Sulfa Drugs Nausea       Medications  Prior to Admission Medications   Prescriptions Last Dose Informant Patient Reported? Taking?   ROPINIRole (REQUIP) 1 MG Tab  Family Member Yes No   Sig: Take 1 mg by mouth 2 Times a Day.   apixaban (ELIQUIS) 2.5mg Tab 9/28/2018 at AM Family Member No No   Sig: Take 1 Tab by mouth 2 Times a Day.   carvedilol (COREG) 6.25 MG Tab UNK at UNK Family Member No No   Sig: Take 1 Tab by mouth 2 times a day, with meals.   clindamycin (CLEOCIN) 150 MG Cap 9/23/2018 at DONE Family Member Yes Yes   Sig: Take 300 mg by mouth 4 times a day. 5 DAYS   ferrous sulfate 325 (65 Fe) MG tablet UNK at UNK Family Member No No   Sig: Take 1 Tab by mouth 3 times a day.   furosemide (LASIX) 40 MG Tab 9/28/2018 at AM Family Member No No   Sig: Take 1 Tab by mouth 2 Times a Day.   lisinopril (PRINIVIL) 5 MG Tab UNK at UNK Family Member No No   Sig: Take 1 Tab by mouth every day.   potassium chloride SA  (KDUR) 20 MEQ Tab CR UNK at UNK Family Member No No   Sig: Take 1 Tab by mouth 2 times a day.   timolol PF (TIMOPTIC PF) 0.5 % Solution UNK at UNK Family Member Yes No   Sig: Place 1 Drop in both eyes 2 Times a Day.   tramadol (ULTRAM) 50 MG Tab UNK at UNK Family Member Yes No   Sig: Take 50 mg by mouth 1 time daily as needed for Moderate Pain or Severe Pain.   travoprost (TRAVATAN Z) 0.004 % Solution UNK at UNK Family Member Yes No   Sig: Place 1 Drop in both eyes every bedtime.      Facility-Administered Medications: None       Physical Exam  Temp:  [36.2 °C (97.2 °F)-36.7 °C (98 °F)] 36.2 °C (97.2 °F)  Pulse:  [67-84] 78  Resp:  [18-22] 18  BP: (148-167)/(77-98) 148/77    Physical Exam   Constitutional: She is oriented to person, place, and time. She appears well-developed. No distress.   Frail cachectic elderly female  Very drowsy but nontoxic   HENT:   Head: Normocephalic and atraumatic.   Right Ear: External ear normal.   Left Ear: External ear normal.   Nose: Nose normal.   Mouth/Throat: Oropharynx is clear and moist. No oropharyngeal exudate.   Eyes: Pupils are equal, round, and reactive to light. Conjunctivae and EOM are normal. Right eye exhibits no discharge. Left eye exhibits no discharge. No scleral icterus.   Neck: Neck supple. JVD present.   Cardiovascular: Normal rate.    Murmur (V-VI.VI) heard.  Irregular   Pulmonary/Chest: Effort normal. She has rales.   Diminished with bibasilar crackles that sound more like fibrosis than rales  Kyphosis   Abdominal: Soft. Bowel sounds are normal. She exhibits no distension (Protuberant due to habitus) and no mass. There is no tenderness. There is no rebound and no guarding.   Musculoskeletal: She exhibits no edema (Brawny with wrinkles suggesting previous worse edema) or tenderness.   Neurological: She is oriented to person, place, and time. No cranial nerve deficit.   Drowsy   Skin: Skin is warm and dry. She is not diaphoretic.   Acrocyanosis of her feet they  are cool with poor cap refill and cyanotic  She has eschars on her left leg  There is no active cellulitis   Psychiatric: She has a normal mood and affect.   Nursing note and vitals reviewed.      Laboratory:  Recent Labs      09/28/18   1400   WBC  6.7   RBC  5.02   HEMOGLOBIN  11.7*   HEMATOCRIT  39.0   MCV  77.7*   MCH  23.3*   MCHC  30.0*   RDW  50.0   PLATELETCT  267   MPV  10.9     Recent Labs      09/28/18   1400   SODIUM  138   POTASSIUM  2.9*   CHLORIDE  99   CO2  30   GLUCOSE  105*   BUN  26*   CREATININE  1.29   CALCIUM  8.4     Recent Labs      09/28/18   1400   ALTSGPT  14   ASTSGOT  27   ALKPHOSPHAT  62   TBILIRUBIN  1.0   LIPASE  34   GLUCOSE  105*     Recent Labs      09/28/18   1400   APTT  39.2*   INR  1.50*     Recent Labs      09/28/18   1400   BNPBTYPENAT  507*         Recent Labs      09/28/18   1400   TROPONINI  0.04       Urinalysis:    Recent Labs      09/28/18   1555   SPECGRAVITY  1.020   GLUCOSEUR  Negative   KETONES  Negative   NITRITE  Negative   LEUKESTERAS  Negative   WBCURINE  5-10*   RBCURINE  2-5*   BACTERIA  Negative   EPITHELCELL  Few        Imaging:  DX-CHEST-PORTABLE (1 VIEW)   Final Result      1.  Stable enlarged cardiac silhouette. There is no acute failure or edema.            Assessment/Plan:  I anticipate this patient is appropriate for observation status at this time.    Chronic right heart failure (HCC)- (present on admission)   Assessment & Plan    Patient has aortic stenosis with right heart failure and severe pulmonary hypertension, her EF is relatively preserved  Crackles in the bases sound more like fibrosis and I think she is slightly over diuresed  We will hydrate cautiously        Moderate to severe pulmonary hypertension- (present on admission)   Assessment & Plan    I believe she has some pulmonary fibrosis based on her exam  Continue supplemental oxygen        Chronic respiratory failure with hypoxia (HCC)   Assessment & Plan    Continue supplemental oxygen         Hypokalemia   Assessment & Plan    Replace in IV fluid        ARF (acute renal failure) (HCC)   Assessment & Plan    secondary to overdiuresis coupled with poor cardiac output  Hydrate cautiously            VTE prophylaxis: On Eliquis

## 2018-09-29 NOTE — PROGRESS NOTES
Patient received stable, with expressive aphasia from previous stroke, vitals wnl, c/o tolerable chronic back pain, care giver at the bedside, on 3L, 2 assist; bed bound, DNR, allergies noted, IV access patent with fluids infusing( total of 500 cc over 10 hours), tele placed, will continue to monitor.

## 2018-09-29 NOTE — PROGRESS NOTES
Straight cath completed, pt tolerated well. 350 mL out. Linens changed. Pt resting in bed at this time.

## 2018-09-29 NOTE — PROGRESS NOTES
Report received from Matthew MARTIN. POC discussed. Pt resting comfortably in bed. Safety precautions in place.

## 2018-09-29 NOTE — ASSESSMENT & PLAN NOTE
Patient has aortic stenosis with right heart failure and severe pulmonary hypertension, her EF is relatively preserved at 50%      Comfort care

## 2018-09-29 NOTE — CARE PLAN
Problem: Communication  Goal: The ability to communicate needs accurately and effectively will improve    Intervention: Use communication aids and/or /Language Line as appropriate   09/29/18 0845   Special Assistance Needs   Patient's Primary Language Kyrgyz      services in use with patient.       Problem: Safety  Goal: Will remain free from falls    Intervention: Implement fall precautions   09/29/18 0845   OTHER   Environmental Precautions Treaded Slipper Socks on Patient;Report Given to Other Health Care Providers Regarding Fall Risk;Mobility Assessed & Appropriate Sign Placed;Personal Belongings, Wastebasket, Call Bell etc. in Easy Reach;Bed in Low Position;Communication Sign for Patients & Families;Transferred to [facility] Side   IV Pole on Same Side of Bed as Bathroom Yes   Bedrails Bedrails Closest to Bathroom Down   Chair/Bed Strip Alarm Yes - Alarm On

## 2018-09-29 NOTE — PROGRESS NOTES
Non blanchable redness noted to sacrum, clean and mepilex applied, skin fragile, bilateral lower extremities with edema and redness, previously treated for cellulitis

## 2018-09-30 PROBLEM — I95.0 IDIOPATHIC HYPOTENSION: Status: ACTIVE | Noted: 2018-01-01

## 2018-09-30 PROBLEM — R53.81 DEBILITY: Status: ACTIVE | Noted: 2018-01-01

## 2018-09-30 NOTE — PROGRESS NOTES
Hourly rounding complete. Pt awake and alert. Responds appropriately to questions. Water provided and oral care by CNA. Safety precautions in place. Call light in reach.

## 2018-09-30 NOTE — CARE PLAN
Problem: Safety  Goal: Will remain free from injury  Outcome: PROGRESSING AS EXPECTED  Non-skid socks in use. Call light in reach. Pt instructed to call for help. Hourly rounding complete. Bed locked and in low position. Pt demonstrates understanding of safety care plan.

## 2018-09-30 NOTE — PROGRESS NOTES
Assessment complete. Pt lethargic but rouses with sternal rub. Safety precautions in place. Call light in reach.

## 2018-09-30 NOTE — PROGRESS NOTES
Report received from Kathie MARTIN. POC discussed. Pt resting comfortably in bed. Safety precautions in place.

## 2018-09-30 NOTE — PROGRESS NOTES
Report given to Guicho MARTIN. POC discussed. Pt resting comfortably in bed. Safety precautions in place.

## 2018-09-30 NOTE — PROGRESS NOTES
Pt easily aroused but continually falls asleep while speaking to her. Charge RN in room. Unsafe to pass medications at this time. Vitals stable. Pt able to answer questions appropriately but prompted to stay awake.

## 2018-09-30 NOTE — PROGRESS NOTES
Bedside report received from Morenita MARTIN. Pt resting in bed, eyes closed, RR even and unlabored.

## 2018-09-30 NOTE — PROGRESS NOTES
Assessment complete, medicated per MAR. Discussed POC and skin care, allowed time to ask questions. Pt resting in bed, RR even and unlabored. Pt up to commode with 2 person assist. Pt educated to call for assistance. Declines needs at this time. Safety precautions in place.

## 2018-09-30 NOTE — CARE PLAN
Problem: Communication  Goal: The ability to communicate needs accurately and effectively will improve  Outcome: PROGRESSING AS EXPECTED  Discussed use of pain scale, call light, medications and allowed time for pt to answer questions. Whiteboard updated, POC discussed.    Problem: Knowledge Deficit  Goal: Knowledge of the prescribed therapeutic regimen will improve  Outcome: PROGRESSING AS EXPECTED  Pt educated on use of supplies, keeping skin clean and dry, medications and IV fluids. Lab tests explained.

## 2018-09-30 NOTE — CARE PLAN
Problem: Infection  Goal: Will remain free from infection  Outcome: PROGRESSING AS EXPECTED  Pt educated on handwashing and hygiene. Standard precautions implemented. Assessed for s/s of infections. VS and labs monitored. Pt demonstrated understanding of infection prevention care plan.

## 2018-09-30 NOTE — THERAPY
"Physical Therapy Evaluation completed.   Bed Mobility:  Supine to Sit: Moderate Assist  Transfers: Sit to Stand: Minimal Assist  Gait: Level Of Assist: Unable to Participate with Front-Wheel Walker  Stood x 1 min     Plan of Care: Will benefit from Physical Therapy 3 times per week  Discharge Recommendations: Equipment: No Equipment Needed. Post-acute therapy Discharge to a transitional care facility for continued skilled therapy services.  89 year old female admitted with failure to thrive Pt lives at home alone with care givers 5 hrs a day Pt has declining function Pt is very lethargic and could only stand for 1 min max.Pt is going to need SNF and considerable increase in function to be able to go home  See \"Rehab Therapy-Acute\" Patient Summary Report for complete documentation.     "

## 2018-09-30 NOTE — PROGRESS NOTES
Tele summary  Rhythm: Afib  Rate: 64-74  Ectopy: Occ bigem, Occ to frequent PVC's  GA: 0.0  QRS: 0.10  QT: 0.42

## 2018-09-30 NOTE — PROGRESS NOTES
[facility] Hospitalist Progress Note    Date of Service: 2018    Chief Complaint  89 y.o. female admitted 2018 with worsening oral intake and increased weakness    Interval Problem Update  patient is poor historian, resting comfortably in bed, no acute complaints this morning, sob and breathing improved per patient, still weak    Consultants/Specialty  palliative    Disposition  tbd        Review of Systems   Constitutional: Positive for malaise/fatigue. Negative for diaphoresis and fever.   HENT: Negative for ear discharge, hearing loss and nosebleeds.    Eyes: Negative for blurred vision, double vision and pain.   Respiratory: Negative for cough, sputum production and shortness of breath.    Cardiovascular: Negative for chest pain, claudication and leg swelling.   Gastrointestinal: Negative for abdominal pain, diarrhea and heartburn.   Genitourinary: Negative for dysuria, flank pain and frequency.   Musculoskeletal: Positive for joint pain and myalgias.   Skin: Negative for itching and rash.   Neurological: Positive for weakness. Negative for dizziness, tingling, tremors and headaches.   Psychiatric/Behavioral: Negative for depression, substance abuse and suicidal ideas.      Physical Exam  Laboratory/Imaging   Hemodynamics  Temp (24hrs), Av.3 °C (97.4 °F), Min:36.3 °C (97.3 °F), Max:36.3 °C (97.4 °F)   Temperature: 36.3 °C (97.3 °F)  Pulse  Av.3  Min: 45  Max: 90    Blood Pressure : (!) 81/50 (Rn notified)      Respiratory      Respiration: 20, Pulse Oximetry: 98 %        RUL Breath Sounds: Clear, RML Breath Sounds: Diminished, RLL Breath Sounds: Diminished, PAWAN Breath Sounds: Clear, LLL Breath Sounds: Diminished    Fluids    Intake/Output Summary (Last 24 hours) at 18 1138  Last data filed at 18 0843   Gross per 24 hour   Intake              470 ml   Output              250 ml   Net              220 ml       Nutrition  Orders Placed This Encounter   Procedures   • Diet Order 2 Gram  Sodium     Standing Status:   Standing     Number of Occurrences:   1     Order Specific Question:   Diet:     Answer:   2 Gram Sodium [7]     Physical Exam   Constitutional: She is oriented to person, place, and time. She appears well-developed and well-nourished. No distress.   HENT:   Head: Normocephalic and atraumatic.   Mouth/Throat: No oropharyngeal exudate.   Dry mucous membranes   Eyes: Pupils are equal, round, and reactive to light. Conjunctivae and EOM are normal. No scleral icterus.   Neck: Normal range of motion. Neck supple. No JVD present.   Cardiovascular: Normal rate and regular rhythm.    No murmur heard.  Pulmonary/Chest: Effort normal and breath sounds normal. No respiratory distress. She has no wheezes. She exhibits no tenderness.   Abdominal: Soft. Bowel sounds are normal. She exhibits no distension. There is no tenderness.   Musculoskeletal: Normal range of motion. She exhibits no edema, tenderness or deformity.   Weakness globally   Lymphadenopathy:     She has no cervical adenopathy.   Neurological: She is alert and oriented to person, place, and time. She exhibits normal muscle tone.   Skin: Skin is warm and dry. No rash noted. No erythema. No pallor.   Psychiatric: She has a normal mood and affect.       Recent Labs      09/28/18   1400  09/30/18   0840   WBC  6.7  9.0   RBC  5.02  5.07   HEMOGLOBIN  11.7*  11.8*   HEMATOCRIT  39.0  42.9   MCV  77.7*  84.6   MCH  23.3*  23.3*   MCHC  30.0*  27.5*   RDW  50.0  55.0*   PLATELETCT  267  289   MPV  10.9  11.2     Recent Labs      09/28/18   1400  09/29/18   0520  09/30/18   0840   SODIUM  138  138  135   POTASSIUM  2.9*  4.0  4.4   CHLORIDE  99  101  97   CO2  30  33  31   GLUCOSE  105*  105*  123*   BUN  26*  26*  36*   CREATININE  1.29  1.42*  1.86*   CALCIUM  8.4  8.6  8.5     Recent Labs      09/28/18   1400   APTT  39.2*   INR  1.50*     Recent Labs      09/28/18   1400   BNPBTYPENAT  507*              Assessment/Plan     ARF (acute renal  failure) (HCC)   Assessment & Plan    secondary to overdiuresis coupled with poor cardiac output, and meds, no hx of CKD  Hydrate cautiously  PHOENIX persistent with worsening Cr now, obtain US renal, dc ACE  Renally dose all meds  monitor        Chronic right heart failure (HCC)- (present on admission)   Assessment & Plan    Patient has aortic stenosis with right heart failure and severe pulmonary hypertension, her EF is relatively preserved at 50%  Hydrate cautiously  Fu outpatient        Moderate to severe pulmonary hypertension- (present on admission)   Assessment & Plan     pulmonary fibrosis based on her exam  Continue supplemental oxygen  palliative        Debility   Assessment & Plan    Patient unable to really care for herself, has caregivers who have their own caregivers, this is patients 2nd admission, I do not feel safe discharging her back to her home, PT/OT recs pending, will likely need SNF        Idiopathic hypotension   Assessment & Plan    Patient on ACE and BB, BP low this morning  Holding meds and monitoring        Chronic respiratory failure with hypoxia (HCC)   Assessment & Plan    Continue supplemental oxygen, no BL O2        Hypokalemia   Assessment & Plan    resolved          Quality-Core Measures   Reviewed items::  Labs reviewed, Radiology images reviewed and Medications reviewed  Wall catheter::  No Wall

## 2018-10-01 PROBLEM — J96.22 ACUTE ON CHRONIC RESPIRATORY FAILURE WITH HYPOXIA AND HYPERCAPNIA (HCC): Status: ACTIVE | Noted: 2018-01-01

## 2018-10-01 PROBLEM — Z71.89 ADVANCE CARE PLANNING: Status: ACTIVE | Noted: 2018-01-01

## 2018-10-01 PROBLEM — J96.21 ACUTE ON CHRONIC RESPIRATORY FAILURE WITH HYPOXIA AND HYPERCAPNIA (HCC): Status: ACTIVE | Noted: 2018-01-01

## 2018-10-01 NOTE — PROGRESS NOTES
Bedside report given to Darshana MATOS RN by Dejah MARTIN. No changes in status, fall precautions in place.

## 2018-10-01 NOTE — CARE PLAN
Problem: Safety  Goal: Will remain free from injury  Outcome: PROGRESSING SLOWER THAN EXPECTED    Goal: Will remain free from falls  Outcome: PROGRESSING SLOWER THAN EXPECTED      Problem: Infection  Goal: Will remain free from infection  Outcome: PROGRESSING SLOWER THAN EXPECTED      Problem: Respiratory:  Goal: Respiratory status will improve  Outcome: PROGRESSING SLOWER THAN EXPECTED

## 2018-10-01 NOTE — ASSESSMENT & PLAN NOTE
Palliative care following, hospice order placed.  IV morphine 4mg every 30 minutes as needed for air hunger, pain, distress, ativan 2mg IV every hour as needed for nausea, vomiting or agitation  Stop all lab draws, discontinue oxygen

## 2018-10-01 NOTE — DISCHARGE PLANNING
Anticipated Discharge Disposition: SNF    Action: LSW spoke with pt's EUGENIO Wright on the telephone who chose a referral to be sent to [facility]. LSW faxed choice form to the [facility].    Barriers to Discharge: None    Plan: LSW to monitor for acceptance to SNF

## 2018-10-01 NOTE — DISCHARGE PLANNING
Anticipated Discharge Disposition: SNF     Action: LSW spoke with pt's DPOA Adriana. She is agreeable to SNF, but would like to talk to other POA, Yadira, to make a final decision on what SNF. She is thinking it will likely be [facility] or [facility].     Barriers: None identified     Plan: Await final decision on SNF from DPOA. OT pending.

## 2018-10-01 NOTE — FLOWSHEET NOTE
10/01/18 1150   Events/Summary/Plan   Events/Summary/Plan ABG drawn, sent to the lab, waiting for results  (Increased O2 to 13L to keep sats >90% post ABG drawn. )   Non-Invasive Resp Device Site Inspection Completed Intact   Blood Gas / Site draw   Blood Gas / Site draw  #R Radial   Allens Test Performed Yes   Site Pressure Held X 5 Min Yes

## 2018-10-01 NOTE — CARE PLAN
Problem: Safety  Goal: Will remain free from falls  Outcome: PROGRESSING AS EXPECTED  No falls this shift. Pivot OOB to BSC with 2PA. Does not call for assist. Frequent toileting offered. Falls precautions in place: bed in lowest and locked   position, side rails raised x 2, room near nurses station, call light within reach, bed alarm on, nonslip socks on, purposeful hourly rounding.       Problem: Skin Integrity  Goal: Risk for impaired skin integrity will decrease  Outcome: PROGRESSING AS EXPECTED  No s/s of skin breakdown noted overnight. Turned/repositioned in bed q2h, heels floated off bed bilaterally. Barrier cream applied. Mepilex foam in place to sacrum.

## 2018-10-01 NOTE — THERAPY
"Occupational Therapy Evaluation completed.   Functional Status: Pt is a 89 y/o female, admit with increasing weakness, failure to thrive. PLOF- Had caregiver 5 days a week, ? self care skills, as Pt was unable to report PLOF. Pt presents with generalized weakness, lethargy, poor ability to complete ADLS and functional mobility. Pt requires Max A to Total A for ADLS and functional transfers. Pt will benefit from acute OT services to increase functional I prior to d/c.   Plan of Care: Will benefit from Occupational Therapy 3 times per week  Discharge Recommendations:  Equipment: Will Continue to Assess for Equipment Needs. Post-acute therapy Discharge to a transitional care facility for continued skilled therapy services.    See \"Rehab Therapy-Acute\" Patient Summary Report for complete documentation.    "

## 2018-10-01 NOTE — DISCHARGE PLANNING
Received Choice form at 0830 from Eliza ROCHA)  Agency/Facility Name: [facility]  Referral sent per Choice form at 0838.

## 2018-10-01 NOTE — PROGRESS NOTES
Amilcar from Lab called with critical result of Ph:7.123 abd PCO2:92.1 at 1220. Critical lab result read back to Amilcar.   Dr. Wright notified of critical lab result at 1230.  Critical lab result read back by Dr. Wright.  No further orders

## 2018-10-01 NOTE — THERAPY
"Speech Language Therapy Clinical Swallow Evaluation completed.    Functional Status: Pt was seen at bedside for swallow evaluation. Per RN (Pattie/Roseann), Pt demonstrated no difficulties taking her medications followed by Boost Plus liquid wash. Per OT (Sadie), Pt demonstrated bolus holding and wet/gurgly vocal quality when attempting to take small bite of puree solids. Pt was able to safely expectorate bolus following prompt from OT.     Pt was found resting in bed, receiving supplemental O2 via N/C. Pt required multiple attempts to wake up fully, and was only able to demonstrate alertness for ~30-sec or less. Pt was unable to follow directives to complete oral mech exam. Additionally, Pt was receptive to x2 ice chips and with no overt s/sx of aspiration; however, was unable to consistently follow cue to vocalize and/or cough. Given the aforementioned information, Pt is not safe for PO intake at this time. Recommend NPO status. Skilled SLP services will f/u with Pt at a later time to complete comprehensive swallow evaluation.    Recommendations - Diet: NPO                          Strategies: To be assessed/determined                          Medication Administration:  NPO if unable to remain awake/alert. Crushed in puree if able.     Plan of Care: Will benefit from Speech Therapy 5 times per week     Post-Acute Therapy: Discharge to a transitional care facility for continued skilled therapy services.    See \"Rehab Therapy-Acute\" Patient Summary Report for complete documentation.   "

## 2018-10-01 NOTE — PROGRESS NOTES
[facility] Hospitalist Progress Note    Date of Service: 10/1/2018    Chief Complaint  89 y.o. female admitted 2018 with worsening oral intake and increased weakness    Interval Problem Update  Patient continues to decline, this morning could not stay awake for more than a min at a time, unable to make decisions for herself,   CXR, ABG ordered stat      12pm called by nursing for worsening clinical status on patient, she is now on 8L O2, Hypotensive, however MAP is >60, has hx of HF, caution with fluids  ABG from this morning pending still  Patient lethargic, DNR    Had a discussion with her POA Adriana and Debbie and they want her comfort care, update Sadie palliative care, I will put in a referral for hospice as well.    Consultants/Specialty  palliative    Disposition  tbd        Review of Systems   Unable to perform ROS: Acuity of condition      Physical Exam  Laboratory/Imaging   Hemodynamics  Temp (24hrs), Av.7 °C (98 °F), Min:36.3 °C (97.4 °F), Max:36.8 °C (98.3 °F)   Temperature: 36.3 °C (97.4 °F)  Pulse  Av.6  Min: 45  Max: 90    Blood Pressure : 104/55, NIBP: (!) 90/65      Respiratory      Respiration: 20, Pulse Oximetry: 94 %        RUL Breath Sounds: Clear, RML Breath Sounds: Diminished, RLL Breath Sounds: Diminished, PAWAN Breath Sounds: Clear, LLL Breath Sounds: Diminished    Fluids    Intake/Output Summary (Last 24 hours) at 10/01/18 1212  Last data filed at 10/01/18 0845   Gross per 24 hour   Intake              410 ml   Output                0 ml   Net              410 ml       Nutrition  Orders Placed This Encounter   Procedures   • Diet NPO     Standing Status:   Standing     Number of Occurrences:   1     Order Specific Question:   Restrict to:     Answer:   Strict [1]     Physical Exam   Constitutional: She appears well-developed and well-nourished. No distress.   lethargic   HENT:   Head: Normocephalic and atraumatic.   Mouth/Throat: No oropharyngeal exudate.   Dry mucous membranes    Eyes: Pupils are equal, round, and reactive to light. Conjunctivae and EOM are normal. No scleral icterus.   Neck: Normal range of motion. Neck supple. No JVD present.   Cardiovascular: Normal rate and regular rhythm.    No murmur heard.  Pulmonary/Chest: Effort normal and breath sounds normal. No respiratory distress. She has no wheezes.   Abdominal: Soft. Bowel sounds are normal. She exhibits no distension. There is no tenderness. There is no rebound.   Musculoskeletal: She exhibits no deformity.   Weakness globally   Lymphadenopathy:     She has no cervical adenopathy.   Neurological:   Lethargic, cant keep her eyes open  Unable to get speech eval  npo   Skin: Skin is warm and dry. No rash noted. No erythema.       Recent Labs      09/28/18   1400  09/30/18   0840  10/01/18   0701   WBC  6.7  9.0  8.8   RBC  5.02  5.07  4.74   HEMOGLOBIN  11.7*  11.8*  10.9*   HEMATOCRIT  39.0  42.9  39.3   MCV  77.7*  84.6  82.9   MCH  23.3*  23.3*  23.0*   MCHC  30.0*  27.5*  27.7*   RDW  50.0  55.0*  53.2*   PLATELETCT  267  289  216   MPV  10.9  11.2  10.9     Recent Labs      09/29/18   0520  09/30/18   0840  10/01/18   0701   SODIUM  138  135  132*   POTASSIUM  4.0  4.4  4.7   CHLORIDE  101  97  97   CO2  33  31  34*   GLUCOSE  105*  123*  106*   BUN  26*  36*  40*   CREATININE  1.42*  1.86*  2.04*   CALCIUM  8.6  8.5  8.5     Recent Labs      09/28/18   1400   APTT  39.2*   INR  1.50*     Recent Labs      09/28/18   1400   BNPBTYPENAT  507*              Assessment/Plan     ARF (acute renal failure) (HCC)   Assessment & Plan    secondary to overdiuresis coupled with poor cardiac output, and meds, no hx of CKD  Hydrate cautiously  PHOENIX persistent with worsening Cr now,US renal neg, dc ACE and eliquis  Place on full dose ASA instead, I discussed with pharmacy  Renally dose all meds    Comfort care now        Chronic right heart failure (HCC)- (present on admission)   Assessment & Plan    Patient has aortic stenosis with  right heart failure and severe pulmonary hypertension, her EF is relatively preserved at 50%      Comfort care        Moderate to severe pulmonary hypertension- (present on admission)   Assessment & Plan     pulmonary fibrosis based on her exam  Continue supplemental oxygen  Palliative, comfort care orders placed  Hospice ordered        Advance care planning   Assessment & Plan    > 30mins spent discussing patients clinical decline, patient with worsening repiratory failure, ABG showing acidosis, kidney failure worse now, BP dropping and overall prognosis is poor and at this time her POA Adriana and Debbie agree to make her comfort care and interested in hospice  I update palliative care  Will place hospice orders        Debility   Assessment & Plan    Comfort care        Idiopathic hypotension   Assessment & Plan    Holding BP meds  IVF cautiously    Comfort care         Acute on chronic respiratory failure with hypoxia and hypercapnia (HCC)   Assessment & Plan    Now with worsening hypoxia, lethargic  CXR noted  ABG pending    However after discussion with POA, patient now comfort care  Palliative aware  Hospice ordered        Hypokalemia   Assessment & Plan    resolved          Quality-Core Measures   Reviewed items::  Labs reviewed, Radiology images reviewed and Medications reviewed  Wall catheter::  No Wall

## 2018-10-02 PROBLEM — E87.6 HYPOKALEMIA: Status: RESOLVED | Noted: 2018-01-01 | Resolved: 2018-01-01

## 2018-10-02 PROBLEM — I95.0 IDIOPATHIC HYPOTENSION: Status: RESOLVED | Noted: 2018-01-01 | Resolved: 2018-01-01

## 2018-10-02 NOTE — THERAPY
SPEECH THERAPY CONTACT NOTE:    Per discussion with RN (Emily) and chart review, Pt's care has been transitioned to comfort care/measures. No further inpatient SLP dysphagia tx is warranted at this time. SLP to sign-off. Please re-consult this service if POC changes. Thank you.

## 2018-10-02 NOTE — DIETARY
NUTRITION SERVICES - Comfort Care Brief Note  The pt is a 89 yo female with an admitting dx of FTT and dehydration.    Per review of chart the pt is noted to be comfort care. Nutrition services to sign off per department policy. Please consult RD for nutrition interventions if there is a change to POC.

## 2018-10-02 NOTE — PROGRESS NOTES
This RN spoke with Yadira Valladares, patients POA, she is here at hospital to take home patients personal belongings. All patients belongings sent home with Yadira. This RN contacted coroners, organ donor, and [email or web address] per protocol and completed expiration paperwork. Per organ donor facility, patient could be a potential donor and they have asked me to call [employer] to ask them to NOT embalm the patient until they get to facility. This RN spoke with [employer], they are aware. This RN signed expiration paperwork and 2 RN pronouncement, charge RN Sona aware of patients passing.

## 2018-10-02 NOTE — PROGRESS NOTES
Patient A+Ox43, confused on time. Denies pain. Does have increased work of breathing, MD Correa aware. Patient incontinent of urine, no BM today. POA at bedside, friends. Morphine and ativan given per EMAR, patient is comfort care. Bed alarm on, call bell in hand, hourly rounding in place

## 2018-10-02 NOTE — CARE PLAN
Problem: Safety  Goal: Free from accidental injury  Outcome: PROGRESSING AS EXPECTED  No falls this shift. Does not call for assistance, but is not impulsive. Falls precautions in place: bed in lowest and locked position, side rails raised x 2, room near nurses station, call light within reach, bed alarm on, nonslip socks on, purposeful hourly rounding.       Problem: Skin Integrity  Goal: Skin Integrity is maintained  No s/s of skin breakdown noted at this time. Patient turned/repositioned in bed q2h. Incontinence care and barrier cream provided.   Prophylactic mepliex in place to sacrum, CDI. Sacrum erythematous but blanchable. Waffle cushion utilized. Heels floated off bed bilaterally.    Problem: Pain  Goal: Alleviation of Pain or a reduction in pain to the patient's comfort goal  Outcome: PROGRESSING AS EXPECTED  No complaints of pain or discomfort overnight. Turned/repositioned q2h, patient resting comfortably.

## 2018-10-02 NOTE — PROGRESS NOTES
This RN at bedside with Sona Maxwell, second RN, to pronounce death. No chest vise, no heart rate, no respirations for one minute. Auscultated with stethoscope. MD Correa aware and at bedside. Time of death 1129

## 2018-10-02 NOTE — DISCHARGE PLANNING
Received Choice form at 3114 from Eliza ROCHA)  Agency/Facility Name: [facility]  Referral sent per Choice form at 5212.

## 2018-10-02 NOTE — PROGRESS NOTES
Patient is not responsive to verbal or tactile stimuli.  No visible chest rise, no pulse, no audible heart tones.  The pupils are fixed and dilated.  This patient is pronounced dead at 1123 hours on 10/2/18 RN aware.  The family has been notified.  Death summary dictated.

## 2018-10-02 NOTE — PROGRESS NOTES
1910: Bedside report received from MARIO Blankenship. Patient resting comfortably in bed. Eyes closed, respirations even and unlabored. Falls precautions in place.      1950: Rounded on patient, drowsy but arouses to voice. Unable to assess orientation level due to expressive aphasia. Patient able to answer 'yes/no' questions appropriately. Denies pain/discomfort. SpO2 WNL on 3L NC. Denies SOB at rest. IV patent and SL'd. Needs addressed. Falls precautions in place. Comfort measures maintained. No further questions or concerns at this time. Will continue to monitor.

## 2018-10-03 PROCEDURE — 665998 HH PPS REVENUE CREDIT

## 2018-10-03 PROCEDURE — 665999 HH PPS REVENUE DEBIT

## 2018-10-03 NOTE — PROGRESS NOTES
Patient Barbara Murray readmitted to [facility] on 9/28/18, and passed away on 10/2/18.

## 2018-10-04 PROCEDURE — 665999 HH PPS REVENUE DEBIT

## 2018-10-04 PROCEDURE — 665998 HH PPS REVENUE CREDIT

## 2018-10-05 ENCOUNTER — APPOINTMENT (OUTPATIENT)
Dept: MEDICAL GROUP | Age: 83
End: 2018-10-05
Payer: MEDICARE

## 2018-10-05 PROCEDURE — 665998 HH PPS REVENUE CREDIT

## 2018-10-05 PROCEDURE — 665999 HH PPS REVENUE DEBIT

## 2018-10-06 PROCEDURE — 665999 HH PPS REVENUE DEBIT

## 2018-10-06 PROCEDURE — 665998 HH PPS REVENUE CREDIT

## 2018-10-07 PROCEDURE — 665998 HH PPS REVENUE CREDIT

## 2018-10-07 PROCEDURE — 665999 HH PPS REVENUE DEBIT

## 2018-10-08 PROCEDURE — 665999 HH PPS REVENUE DEBIT

## 2018-10-08 PROCEDURE — 665998 HH PPS REVENUE CREDIT

## 2018-10-09 PROCEDURE — 665998 HH PPS REVENUE CREDIT

## 2018-10-09 PROCEDURE — 665999 HH PPS REVENUE DEBIT

## 2018-10-10 PROCEDURE — 665999 HH PPS REVENUE DEBIT

## 2018-10-10 PROCEDURE — 665998 HH PPS REVENUE CREDIT

## 2018-10-11 PROCEDURE — 665999 HH PPS REVENUE DEBIT

## 2018-10-11 PROCEDURE — 665998 HH PPS REVENUE CREDIT

## 2018-10-12 PROCEDURE — 665999 HH PPS REVENUE DEBIT

## 2018-10-12 PROCEDURE — 665998 HH PPS REVENUE CREDIT

## 2018-10-13 PROCEDURE — 665999 HH PPS REVENUE DEBIT

## 2018-10-13 PROCEDURE — 665998 HH PPS REVENUE CREDIT

## 2018-10-14 PROCEDURE — 665999 HH PPS REVENUE DEBIT

## 2018-10-14 PROCEDURE — 665998 HH PPS REVENUE CREDIT

## 2018-10-15 PROCEDURE — 665999 HH PPS REVENUE DEBIT

## 2018-10-15 PROCEDURE — 665998 HH PPS REVENUE CREDIT

## 2018-10-16 PROCEDURE — 665999 HH PPS REVENUE DEBIT

## 2018-10-16 PROCEDURE — 665998 HH PPS REVENUE CREDIT

## 2018-10-17 PROCEDURE — 665998 HH PPS REVENUE CREDIT

## 2018-10-17 PROCEDURE — 665999 HH PPS REVENUE DEBIT

## 2018-10-18 PROCEDURE — 665998 HH PPS REVENUE CREDIT

## 2018-10-18 PROCEDURE — 665999 HH PPS REVENUE DEBIT

## 2018-10-19 PROCEDURE — 665999 HH PPS REVENUE DEBIT

## 2018-10-19 PROCEDURE — 665998 HH PPS REVENUE CREDIT

## 2018-10-20 PROCEDURE — 665998 HH PPS REVENUE CREDIT

## 2018-10-20 PROCEDURE — 665999 HH PPS REVENUE DEBIT

## 2018-10-21 PROCEDURE — 665998 HH PPS REVENUE CREDIT

## 2018-10-21 PROCEDURE — 665999 HH PPS REVENUE DEBIT

## 2018-10-22 PROCEDURE — 665998 HH PPS REVENUE CREDIT

## 2018-10-22 PROCEDURE — 665999 HH PPS REVENUE DEBIT

## 2018-10-23 PROCEDURE — 665999 HH PPS REVENUE DEBIT

## 2018-10-23 PROCEDURE — 665998 HH PPS REVENUE CREDIT

## 2018-10-24 PROCEDURE — 665998 HH PPS REVENUE CREDIT

## 2018-10-24 PROCEDURE — 665999 HH PPS REVENUE DEBIT

## 2018-10-25 PROCEDURE — 665999 HH PPS REVENUE DEBIT

## 2018-10-25 PROCEDURE — 665998 HH PPS REVENUE CREDIT

## 2018-10-26 PROCEDURE — 665998 HH PPS REVENUE CREDIT

## 2018-10-26 PROCEDURE — 665999 HH PPS REVENUE DEBIT

## 2018-10-27 PROCEDURE — 665998 HH PPS REVENUE CREDIT

## 2018-10-27 PROCEDURE — 665999 HH PPS REVENUE DEBIT

## 2018-10-28 PROCEDURE — 665999 HH PPS REVENUE DEBIT

## 2018-10-28 PROCEDURE — 665998 HH PPS REVENUE CREDIT

## 2018-10-29 PROCEDURE — 665998 HH PPS REVENUE CREDIT

## 2018-10-29 PROCEDURE — 665999 HH PPS REVENUE DEBIT

## 2018-10-30 PROCEDURE — 665999 HH PPS REVENUE DEBIT

## 2018-10-30 PROCEDURE — 665998 HH PPS REVENUE CREDIT

## 2018-10-31 PROCEDURE — 665999 HH PPS REVENUE DEBIT

## 2018-10-31 PROCEDURE — 665998 HH PPS REVENUE CREDIT

## 2018-11-01 PROCEDURE — 665999 HH PPS REVENUE DEBIT

## 2018-11-01 PROCEDURE — 665998 HH PPS REVENUE CREDIT

## 2018-11-02 PROCEDURE — 665998 HH PPS REVENUE CREDIT

## 2018-11-02 PROCEDURE — 665999 HH PPS REVENUE DEBIT

## 2018-11-03 PROCEDURE — 665998 HH PPS REVENUE CREDIT

## 2018-11-03 PROCEDURE — 665999 HH PPS REVENUE DEBIT

## 2018-11-04 PROCEDURE — 665998 HH PPS REVENUE CREDIT

## 2018-11-04 PROCEDURE — 665999 HH PPS REVENUE DEBIT

## 2018-11-05 PROCEDURE — 665998 HH PPS REVENUE CREDIT

## 2018-11-05 PROCEDURE — 665999 HH PPS REVENUE DEBIT

## 2018-11-06 PROCEDURE — 665998 HH PPS REVENUE CREDIT

## 2018-11-06 PROCEDURE — 665999 HH PPS REVENUE DEBIT

## 2018-11-07 PROCEDURE — 665998 HH PPS REVENUE CREDIT

## 2018-11-07 PROCEDURE — 665999 HH PPS REVENUE DEBIT

## 2018-11-08 PROCEDURE — 665998 HH PPS REVENUE CREDIT

## 2018-11-08 PROCEDURE — 665999 HH PPS REVENUE DEBIT

## 2018-11-09 PROCEDURE — 665999 HH PPS REVENUE DEBIT

## 2018-11-09 PROCEDURE — 665998 HH PPS REVENUE CREDIT

## 2018-11-10 PROCEDURE — 665999 HH PPS REVENUE DEBIT

## 2018-11-10 PROCEDURE — 665998 HH PPS REVENUE CREDIT

## 2018-11-11 PROCEDURE — 665999 HH PPS REVENUE DEBIT

## 2018-11-11 PROCEDURE — 665998 HH PPS REVENUE CREDIT

## 2018-11-12 PROCEDURE — 665999 HH PPS REVENUE DEBIT

## 2018-11-12 PROCEDURE — 665998 HH PPS REVENUE CREDIT

## 2018-11-13 PROCEDURE — 665998 HH PPS REVENUE CREDIT

## 2018-11-13 PROCEDURE — 665999 HH PPS REVENUE DEBIT

## 2018-11-14 PROCEDURE — 665998 HH PPS REVENUE CREDIT

## 2018-11-14 PROCEDURE — 665999 HH PPS REVENUE DEBIT

## 2018-11-15 PROCEDURE — 665998 HH PPS REVENUE CREDIT

## 2018-11-15 PROCEDURE — 665999 HH PPS REVENUE DEBIT

## 2018-11-16 PROCEDURE — 665999 HH PPS REVENUE DEBIT

## 2018-11-16 PROCEDURE — 665998 HH PPS REVENUE CREDIT

## 2019-04-19 NOTE — ED NOTES
"Med rec updated and complete  Allergies reviewed  Pt states \"I have TRAMADOL 50MG and ER 100MG\".  Pt states \"Last time a remember taking my medications were on Monday 1/22/2018\".    " No

## 2020-07-16 NOTE — DISCHARGE SUMMARY
Death Summary    Cause of Death  Hypoxic respiratory failure due to pulmonary hypertension due to unknown cause.    Comorbid Conditions at the Time of Death  Active Problems:    Moderate to severe pulmonary hypertension POA: Yes    Chronic right heart failure (HCC) POA: Yes    ARF (acute renal failure) (HCC) POA: Unknown    Acute on chronic respiratory failure with hypoxia and hypercapnia (HCC) POA: Unknown    Debility POA: Unknown    Advance care planning POA: Unknown  Resolved Problems:    Hypokalemia POA: Unknown    Idiopathic hypotension POA: Unknown         History of Presenting Illness  89 y.o. female who presented 9/28/2018 with worsening oral intake and increased weakness and lethargy.  The patient was apparently discharged 2 weeks prior to this admission and went home with caregivers, she was apparently eating well prior to leaving the hospital but since she went home her appetite has been less and less and she is been getting more and more weak and lethargic, she had some diarrhea initially after taking antibiotics for cellulitis but that has subsided but they attribute this to the fact that she really has not been eating much of anything.  She was admitted for stabilization and further workup.  At the time of admission the palliative care consult was obtained.  She did not improve unfortunately and had worsening respiratory failure and hypoxia.  Due to her chronic heart failure and pulmonary hypertension she did not wish to be resuscitated.  Her caregivers and power of  requested that she be placed on comfort care.  She continued to decline and on the day of her death had severe air hunger which was successfully treated with 1 dose of 4 mg of morphine and 2 mg of Ativan.  After this she became very peaceful her symptoms were well controlled and she passed away uneventfully.    Death Date: 10/02/18   Death Time: 1123      Pronounced By (MD): Dr Kayleigh Correa   Pronounced By (RN1): Emily Barbosa  RN  Pronounced By (RN2): Morenita Cazares RN      Suturegard Intro: Intraoperative tissue expansion was performed, utilizing the SUTUREGARD device, in order to reduce wound tension.
